# Patient Record
Sex: FEMALE | Race: WHITE | NOT HISPANIC OR LATINO | Employment: OTHER | URBAN - METROPOLITAN AREA
[De-identification: names, ages, dates, MRNs, and addresses within clinical notes are randomized per-mention and may not be internally consistent; named-entity substitution may affect disease eponyms.]

---

## 2019-02-18 ENCOUNTER — APPOINTMENT (OUTPATIENT)
Dept: LAB | Facility: HOSPITAL | Age: 79
End: 2019-02-18

## 2019-02-18 ENCOUNTER — OFFICE VISIT (OUTPATIENT)
Dept: FAMILY MEDICINE CLINIC | Facility: CLINIC | Age: 79
End: 2019-02-18

## 2019-02-18 VITALS
WEIGHT: 230 LBS | HEIGHT: 62 IN | OXYGEN SATURATION: 95 % | DIASTOLIC BLOOD PRESSURE: 86 MMHG | BODY MASS INDEX: 42.33 KG/M2 | HEART RATE: 73 BPM | SYSTOLIC BLOOD PRESSURE: 128 MMHG

## 2019-02-18 DIAGNOSIS — K21.9 GASTROESOPHAGEAL REFLUX DISEASE, ESOPHAGITIS PRESENCE NOT SPECIFIED: ICD-10-CM

## 2019-02-18 DIAGNOSIS — I10 ESSENTIAL HYPERTENSION: Primary | ICD-10-CM

## 2019-02-18 DIAGNOSIS — E87.6 HYPOKALEMIA: ICD-10-CM

## 2019-02-18 LAB
ALBUMIN SERPL-MCNC: 4.06 G/DL (ref 3.2–4.8)
ALBUMIN/GLOB SERPL: 1.4 G/DL (ref 1.5–2.5)
ALP SERPL-CCNC: 107 U/L (ref 25–100)
ALT SERPL W P-5'-P-CCNC: 24 U/L (ref 7–40)
ANION GAP SERPL CALCULATED.3IONS-SCNC: 8 MMOL/L (ref 3–11)
AST SERPL-CCNC: 26 U/L (ref 0–33)
BILIRUB SERPL-MCNC: 0.5 MG/DL (ref 0.3–1.2)
BUN BLD-MCNC: 17 MG/DL (ref 9–23)
BUN/CREAT SERPL: 21.3 (ref 7–25)
CALCIUM SPEC-SCNC: 8.9 MG/DL (ref 8.7–10.4)
CHLORIDE SERPL-SCNC: 104 MMOL/L (ref 99–109)
CO2 SERPL-SCNC: 30 MMOL/L (ref 20–31)
CREAT BLD-MCNC: 0.8 MG/DL (ref 0.6–1.3)
GFR SERPL CREATININE-BSD FRML MDRD: 70 ML/MIN/1.73
GLOBULIN UR ELPH-MCNC: 2.8 GM/DL
GLUCOSE BLD-MCNC: 96 MG/DL (ref 70–100)
POTASSIUM BLD-SCNC: 4 MMOL/L (ref 3.5–5.5)
PROT SERPL-MCNC: 6.9 G/DL (ref 5.7–8.2)
SODIUM BLD-SCNC: 142 MMOL/L (ref 132–146)

## 2019-02-18 PROCEDURE — 80053 COMPREHEN METABOLIC PANEL: CPT | Performed by: FAMILY MEDICINE

## 2019-02-18 PROCEDURE — 99204 OFFICE O/P NEW MOD 45 MIN: CPT | Performed by: FAMILY MEDICINE

## 2019-02-18 RX ORDER — POTASSIUM CHLORIDE 20 MEQ/1
20 TABLET, EXTENDED RELEASE ORAL DAILY
Qty: 60 TABLET | Refills: 0 | Status: SHIPPED | OUTPATIENT
Start: 2019-02-18 | End: 2019-08-19

## 2019-02-18 RX ORDER — FELODIPINE 10 MG/1
10 TABLET, EXTENDED RELEASE ORAL DAILY
Qty: 90 TABLET | Refills: 1 | Status: SHIPPED | OUTPATIENT
Start: 2019-02-18 | End: 2019-08-19 | Stop reason: SDUPTHER

## 2019-02-18 RX ORDER — TRIAMTERENE AND HYDROCHLOROTHIAZIDE 37.5; 25 MG/1; MG/1
1 CAPSULE ORAL EVERY MORNING
Qty: 90 CAPSULE | Refills: 1 | Status: SHIPPED | OUTPATIENT
Start: 2019-02-18 | End: 2019-08-19 | Stop reason: SDUPTHER

## 2019-02-18 RX ORDER — OXYBUTYNIN CHLORIDE 10 MG/1
10 TABLET, EXTENDED RELEASE ORAL DAILY
COMMUNITY
End: 2020-09-28

## 2019-02-18 RX ORDER — LISINOPRIL 40 MG/1
40 TABLET ORAL DAILY
COMMUNITY
End: 2019-02-18 | Stop reason: SDUPTHER

## 2019-02-18 RX ORDER — POTASSIUM CHLORIDE 20 MEQ/1
20 TABLET, EXTENDED RELEASE ORAL 2 TIMES DAILY
Qty: 180 TABLET | Refills: 1 | Status: SHIPPED | OUTPATIENT
Start: 2019-02-18 | End: 2019-08-19 | Stop reason: SDUPTHER

## 2019-02-18 RX ORDER — POTASSIUM CHLORIDE 20 MEQ/1
20 TABLET, EXTENDED RELEASE ORAL 2 TIMES DAILY
COMMUNITY
End: 2019-02-18 | Stop reason: SDUPTHER

## 2019-02-18 RX ORDER — LISINOPRIL 40 MG/1
40 TABLET ORAL DAILY
Qty: 30 TABLET | Refills: 0 | Status: SHIPPED | OUTPATIENT
Start: 2019-02-18 | End: 2019-08-19

## 2019-02-18 RX ORDER — MONTELUKAST SODIUM 10 MG/1
10 TABLET ORAL NIGHTLY
COMMUNITY
End: 2021-08-05 | Stop reason: SDUPTHER

## 2019-02-18 RX ORDER — OMEPRAZOLE 20 MG/1
20 CAPSULE, DELAYED RELEASE ORAL DAILY
Qty: 90 CAPSULE | Refills: 1 | Status: SHIPPED | OUTPATIENT
Start: 2019-02-18 | End: 2019-08-19 | Stop reason: SDUPTHER

## 2019-02-18 RX ORDER — OMEPRAZOLE 20 MG/1
20 CAPSULE, DELAYED RELEASE ORAL DAILY
Qty: 30 CAPSULE | Refills: 0 | Status: SHIPPED | OUTPATIENT
Start: 2019-02-18 | End: 2019-08-19

## 2019-02-18 RX ORDER — OMEPRAZOLE 20 MG/1
20 CAPSULE, DELAYED RELEASE ORAL DAILY
COMMUNITY
End: 2019-02-18 | Stop reason: SDUPTHER

## 2019-02-18 RX ORDER — FELODIPINE 10 MG/1
10 TABLET, EXTENDED RELEASE ORAL DAILY
COMMUNITY
End: 2019-02-18 | Stop reason: SDUPTHER

## 2019-02-18 RX ORDER — TRIAMTERENE AND HYDROCHLOROTHIAZIDE 37.5; 25 MG/1; MG/1
1 CAPSULE ORAL EVERY MORNING
COMMUNITY
End: 2019-02-18 | Stop reason: SDUPTHER

## 2019-02-18 RX ORDER — LISINOPRIL 40 MG/1
40 TABLET ORAL DAILY
Qty: 90 TABLET | Refills: 1 | Status: SHIPPED | OUTPATIENT
Start: 2019-02-18 | End: 2019-08-19 | Stop reason: SDUPTHER

## 2019-02-18 RX ORDER — ALBUTEROL SULFATE 90 UG/1
2 AEROSOL, METERED RESPIRATORY (INHALATION) EVERY 4 HOURS PRN
COMMUNITY
End: 2020-01-21 | Stop reason: SDUPTHER

## 2019-02-18 RX ORDER — FELODIPINE 10 MG/1
10 TABLET, EXTENDED RELEASE ORAL DAILY
Qty: 30 TABLET | Refills: 0 | Status: SHIPPED | OUTPATIENT
Start: 2019-02-18 | End: 2019-08-19

## 2019-02-18 NOTE — PATIENT INSTRUCTIONS
Recombinant Zoster (Shingles) Vaccine, RZV: What You Need to Know  1. Why get vaccinated?  Shingles (also called herpes zoster, or just zoster) is a painful skin rash, often with blisters. Shingles is caused by the varicella zoster virus, the same virus that causes chickenpox. After you have chickenpox, the virus stays in your body and can cause shingles later in life.  You can't catch shingles from another person. However, a person who has never had chickenpox (or chickenpox vaccine) could get chickenpox from someone with shingles.  A shingles rash usually appears on one side of the face or body and heals within 2 to 4 weeks. Its main symptom is pain, which can be severe. Other symptoms can include fever, headache, chills and upset stomach. Very rarely, a shingles infection can lead to pneumonia, hearing problems, blindness, brain inflammation (encephalitis), or death.  For about 1 person in 5, severe pain can continue even long after the rash has cleared up. This long-lasting pain is called post-herpetic neuralgia (PHN).  Shingles is far more common in people 50 years of age and older than in younger people, and the risk increases with age. It is also more common in people whose immune system is weakened because of a disease such as cancer, or by drugs such as steroids or chemotherapy.  At least 1 million people a year in the [place] get shingles.  2. Shingles vaccine (recombinant)  Recombinant shingles vaccine was approved by FDA in 2017 for the prevention of shingles. In clinical trials, it was more than 90% effective in preventing shingles. It can also reduce the likelihood of PHN.  Two doses, 2 to 6 months apart, are recommended for adults 50 and older.  This vaccine is also recommended for people who have already gotten the live shingles vaccine (Zostavax). There is no live virus in this vaccine.  3. Some people should not get this vaccine  Tell your vaccine provider if you:  · Have any severe,  life-threatening allergies. A person who has ever had a life-threatening allergic reaction after a dose of recombinant shingles vaccine, or has a severe allergy to any component of this vaccine, may be advised not to be vaccinated. Ask your health care provider if you want information about vaccine components.  · Are pregnant or breastfeeding. There is not much information about use of recombinant shingles vaccine in pregnant or nursing women. Your healthcare provider might recommend delaying vaccination.  · Are not feeling well. If you have a mild illness, such as a cold, you can probably get the vaccine today. If you are moderately or severely ill, you should probably wait until you recover. Your doctor can advise you.    4. Risks of a vaccine reaction  With any medicine, including vaccines, there is a chance of reactions.  After recombinant shingles vaccination, a person might experience:  · Pain, redness, soreness, or swelling at the site of the injection  · Headache, muscle aches, fever, shivering, fatigue    In clinical trials, most people got a sore arm with mild or moderate pain after vaccination, and some also had redness and swelling where they got the shot. Some people felt tired, had muscle pain, a headache, shivering, fever, stomach pain, or nausea. About 1 out of 6 people who got recombinant zoster vaccine experienced side effects that prevented them from doing regular activities. Symptoms went away on their own in about 2 to 3 days. Side effects were more common in younger people.  You should still get the second dose of recombinant zoster vaccine even if you had one of these reactions after the first dose.  Other things that could happen after this vaccine:  · People sometimes faint after medical procedures, including vaccination. Sitting or lying down for about 15 minutes can help prevent fainting and injuries caused by a fall. Tell your provider if you feel dizzy or have vision changes or ringing  in the ears.  · Some people get shoulder pain that can be more severe and longer-lasting than routine soreness that can follow injections. This happens very rarely.  · Any medication can cause a severe allergic reaction. Such reactions to a vaccine are estimated at about 1 in a million doses, and would happen within a few minutes to a few hours after the vaccination.  As with any medicine, there is a very remote chance of a vaccine causing a serious injury or death.  The safety of vaccines is always being monitored. For more information, visit: www.cdc.gov/vaccinesafety/  5. What if there is a serious problem?  What should I look for?  · Look for anything that concerns you, such as signs of a severe allergic reaction, very high fever, or unusual behavior.  Signs of a severe allergic reaction can include hives, swelling of the face and throat, difficulty breathing, a fast heartbeat, dizziness, and weakness. These would usually start a few minutes to a few hours after the vaccination.  What should I do?  · If you think it is a severe allergic reaction or other emergency that can't wait, call 9-1-1 and get to the nearest hospital. Otherwise, call your health care provider.  Afterward, the reaction should be reported to the Vaccine Adverse Event Reporting System (VAERS). Your doctor should file this report, or you can do it yourself through the VAERS web site atwww.vaers.[facility].govor by calling 1-247.902.9596.  VAERS does not give medical advice.  6. How can I learn more?  · Ask your healthcare provider. He or she can give you the vaccine package insert or suggest other sources of information.  · Call your local or state health department.  · Contact the Centers for Disease Control and Prevention (CDC):  ? Call 1-336.118.4881 (6-862-DZP-INFO) or  ? Visit the CDC's website at www.cdc.gov/vaccines  CDC Vaccine Information Statement (VIS) Recombinant Zoster Vaccine (02/12/2018)  This information is not intended to replace  advice given to you by your health care provider. Make sure you discuss any questions you have with your health care provider.  Document Released: 02/27/2018 Document Revised: 02/27/2018 Document Reviewed: 02/27/2018  Elsevier Interactive Patient Education © 2018 [employer]

## 2019-02-18 NOTE — PROGRESS NOTES
Mariela Long presents today to establish care.    Chief Complaint   Patient presents with   • Establish Care     needs PCP        HPI     Sees pulmonologist. Allergies. She has spots on her lungs, CT scans.     HTN:  Blood pressure usually runs high. Out of blood pressure lisinopril and felodipine medication for a week. Doesn't always take felodipine, unless BP is above 200. Legs are swollen, doesn't take diuretics all the time. Out of potassium for awhile too.     GERD:  If she doesn't take prilosec then she has heartburn.     PHQ-2/PHQ-9 Depression Screening 2019   Little interest or pleasure in doing things 0   Feeling down, depressed, or hopeless 0   Total Score 0       Review of Systems   Constitutional: Positive for fatigue.   HENT: Negative.    Eyes: Negative.    Cardiovascular: Positive for leg swelling.   Gastrointestinal: Positive for GERD.   Endocrine: Negative.    Genitourinary: Negative.    Musculoskeletal: Negative.    Allergic/Immunologic: Positive for environmental allergies.   Neurological: Negative.    Hematological: Negative.    Psychiatric/Behavioral: Negative.         Past Medical History:   Diagnosis Date   • Aortic aneurysm (CMS/HCC)    • Arthritis    • Asthma    • Environmental allergies     dust, pollen, trees, grass   • Fractures    • GERD (gastroesophageal reflux disease)    • Hypertension    • Lesion of lung    • Liver lesion    • Shingles     age [age]        Past Surgical History:   Procedure Laterality Date   •  SECTION  8, , , ,    • WRIST SURGERY          Family History   Problem Relation Age of Onset   • Obesity Mother    • Lung cancer Father    • Lung cancer Sister    • Obesity Sister         Social History     Socioeconomic History   • Marital status: Single     Spouse name: Not on file   • Number of children: Not on file   • Years of education: Not on file   • Highest education level: Not on file   Social Needs   • Financial resource strain: Not  "on file   • Food insecurity - worry: Not on file   • Food insecurity - inability: Not on file   • Transportation needs - medical: Not on file   • Transportation needs - non-medical: Not on file   Occupational History     Comment: works with mentally challenged adults   Tobacco Use   • Smoking status: Never Smoker   • Smokeless tobacco: Never Used   Substance and Sexual Activity   • Alcohol use: Not on file   • Drug use: No   • Sexual activity: Not on file   Other Topics Concern   • Not on file   Social History Narrative   • Not on file        Current Outpatient Medications on File Prior to Visit   Medication Sig Dispense Refill   • albuterol sulfate  (90 Base) MCG/ACT inhaler Inhale 2 puffs Every 4 (Four) Hours As Needed for Wheezing.     • Fluticasone Furoate-Vilanterol (BREO ELLIPTA) 200-25 MCG/INH inhaler Inhale 1 puff Daily.     • montelukast (SINGULAIR) 10 MG tablet Take 10 mg by mouth Every Night.     • oxybutynin XL (DITROPAN-XL) 10 MG 24 hr tablet Take 10 mg by mouth Daily.     • [DISCONTINUED] felodipine (PLENDIL) 10 MG 24 hr tablet Take 10 mg by mouth Daily.     • [DISCONTINUED] lisinopril (PRINIVIL,ZESTRIL) 40 MG tablet Take 40 mg by mouth Daily.     • [DISCONTINUED] omeprazole (priLOSEC) 20 MG capsule Take 20 mg by mouth Daily.     • [DISCONTINUED] potassium chloride (K-DUR,KLOR-CON) 20 MEQ CR tablet Take 20 mEq by mouth 2 (Two) Times a Day.     • [DISCONTINUED] triamterene-hydrochlorothiazide (DYAZIDE) 37.5-25 MG per capsule Take 1 capsule by mouth Every Morning.       No current facility-administered medications on file prior to visit.        Allergies   Allergen Reactions   • Latex Rash        Visit Vitals  /86 (BP Location: Left arm, Patient Position: Sitting, Cuff Size: Large Adult)   Pulse 73   Ht 157.5 cm (62\")   Wt 104 kg (230 lb)   SpO2 95%   BMI 42.07 kg/m²        Physical Exam   Constitutional: She is oriented to person, place, and time. No distress. She is obese.  HENT:   Nose: " Nose normal.   Mouth/Throat: Oropharynx is clear and moist.   Eyes: Conjunctivae are normal. Pupils are equal, round, and reactive to light.   Neck: Neck supple. No thyromegaly present.   Cardiovascular: Normal rate and regular rhythm.   No murmur heard.  Pulses:       Posterior tibial pulses are 2+ on the right side, and 2+ on the left side.   Pulmonary/Chest: Effort normal and breath sounds normal.   Abdominal: Soft. There is no hepatosplenomegaly. There is no tenderness.   Surgical scar   Musculoskeletal: She exhibits no edema.   Lymphadenopathy:     She has no cervical adenopathy.   Neurological: She is alert and oriented to person, place, and time.   Skin: Skin is warm and dry. No rash noted.   Psychiatric: She has a normal mood and affect. Her behavior is normal. Judgment and thought content normal.   Vitals reviewed.       No results found for this or any previous visit.     Mariela was seen today for establish care.  Prior PCP, urology, pulmonology, mammogram, CT scan records requested  Diagnoses and all orders for this visit:    Essential hypertension  -     lisinopril (PRINIVIL,ZESTRIL) 40 MG tablet; Take 1 tablet by mouth Daily.  -     triamterene-hydrochlorothiazide (DYAZIDE) 37.5-25 MG per capsule; Take 1 capsule by mouth Every Morning.  -     felodipine (PLENDIL) 10 MG 24 hr tablet; Take 1 tablet by mouth Daily.  -     Comprehensive Metabolic Panel; Future  -     felodipine (PLENDIL) 10 MG 24 hr tablet; Take 1 tablet by mouth Daily.  -     lisinopril (PRINIVIL,ZESTRIL) 40 MG tablet; Take 1 tablet by mouth Daily.  -     omeprazole (PRILOSEC) 20 MG capsule; Take 1 capsule by mouth Daily.  Stable.  Non-compliant with medications.  She has only taking medications and her blood pressure was above 180-200.  Discussed long-term risk of uncontrolled hypertension.  Check labs today due to chronic hypokalemia and uses both lisinopril potassium and Dyazide.  Of note patient is nonfasting and had sweet  tea.  Hypokalemia  -     potassium chloride (K-DUR,KLOR-CON) 20 MEQ CR tablet; Take 1 tablet by mouth 2 (Two) Times a Day.  -     potassium chloride (KLOR-CON) 20 MEQ CR tablet; Take 1 tablet by mouth Daily.   Check labs today due to chronic hypokalemia and uses both lisinopril potassium and Dyazide.  Of note patient is nonfasting and had sweet tea.  Gastroesophageal reflux disease, esophagitis presence not specified  -     omeprazole (priLOSEC) 20 MG capsule; Take 1 capsule by mouth Daily.  Discussed with patient long-term risks of PPI use.  She reports that improves her quality of life and controls her heartburn.  Continue omeprazole.    Discussed Shingrix vaccination at local pharmacy.  Handout provided.    Return in about 6 months (around 8/18/2019) for Medicare Wellness.

## 2019-03-20 PROBLEM — R91.8 GROUND GLASS OPACITY PRESENT ON IMAGING OF LUNG: Status: ACTIVE | Noted: 2017-01-01

## 2019-08-19 ENCOUNTER — OFFICE VISIT (OUTPATIENT)
Dept: FAMILY MEDICINE CLINIC | Facility: CLINIC | Age: 79
End: 2019-08-19

## 2019-08-19 VITALS
BODY MASS INDEX: 42.62 KG/M2 | OXYGEN SATURATION: 92 % | DIASTOLIC BLOOD PRESSURE: 82 MMHG | WEIGHT: 231.6 LBS | HEIGHT: 62 IN | SYSTOLIC BLOOD PRESSURE: 138 MMHG | HEART RATE: 63 BPM

## 2019-08-19 DIAGNOSIS — K21.9 GASTROESOPHAGEAL REFLUX DISEASE, ESOPHAGITIS PRESENCE NOT SPECIFIED: ICD-10-CM

## 2019-08-19 DIAGNOSIS — E87.6 HYPOKALEMIA: ICD-10-CM

## 2019-08-19 DIAGNOSIS — I10 ESSENTIAL HYPERTENSION: Primary | ICD-10-CM

## 2019-08-19 PROCEDURE — 99214 OFFICE O/P EST MOD 30 MIN: CPT | Performed by: FAMILY MEDICINE

## 2019-08-19 RX ORDER — TRIAMTERENE AND HYDROCHLOROTHIAZIDE 37.5; 25 MG/1; MG/1
1 CAPSULE ORAL EVERY MORNING
Qty: 90 CAPSULE | Refills: 1 | Status: SHIPPED | OUTPATIENT
Start: 2019-08-19 | End: 2020-09-28 | Stop reason: SDUPTHER

## 2019-08-19 RX ORDER — LISINOPRIL 40 MG/1
40 TABLET ORAL DAILY
Qty: 90 TABLET | Refills: 1 | Status: SHIPPED | OUTPATIENT
Start: 2019-08-19 | End: 2020-03-10

## 2019-08-19 RX ORDER — OMEPRAZOLE 20 MG/1
20 CAPSULE, DELAYED RELEASE ORAL DAILY
Qty: 90 CAPSULE | Refills: 1 | Status: SHIPPED | OUTPATIENT
Start: 2019-08-19 | End: 2020-03-10

## 2019-08-19 RX ORDER — POTASSIUM CHLORIDE 20 MEQ/1
20 TABLET, EXTENDED RELEASE ORAL 2 TIMES DAILY
Qty: 180 TABLET | Refills: 1 | Status: SHIPPED | OUTPATIENT
Start: 2019-08-19 | End: 2020-03-10

## 2019-08-19 RX ORDER — FELODIPINE 10 MG/1
10 TABLET, EXTENDED RELEASE ORAL DAILY
Qty: 90 TABLET | Refills: 1 | Status: SHIPPED | OUTPATIENT
Start: 2019-08-19 | End: 2020-09-28 | Stop reason: SDUPTHER

## 2019-08-19 NOTE — PROGRESS NOTES
Chief Complaint   Patient presents with   • Hypertension   • Heartburn        Hypertension   This is a chronic problem. Pertinent negatives include no chest pain or palpitations. Current antihypertension treatment includes diuretics, calcium channel blockers and ACE inhibitors.   Heartburn   She reports no chest pain. This is a chronic problem. Associated symptoms include fatigue. She has tried a PPI for the symptoms.       Hasn't checked home blood pressure lately. Legs are always swollen.      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       Right lower back pain, non-radiating. Taking aleve as needed.     Review of Systems   Constitutional: Positive for fatigue.   Cardiovascular: Positive for leg swelling. Negative for chest pain and palpitations.   Gastrointestinal: Negative for GERD.   Musculoskeletal: Positive for back pain.   Psychiatric/Behavioral: Positive for stress.        Current Outpatient Medications on File Prior to Visit   Medication Sig Dispense Refill   • albuterol sulfate  (90 Base) MCG/ACT inhaler Inhale 2 puffs Every 4 (Four) Hours As Needed for Wheezing.     • Fluticasone Furoate-Vilanterol (BREO ELLIPTA) 200-25 MCG/INH inhaler Inhale 1 puff Daily.     • montelukast (SINGULAIR) 10 MG tablet Take 10 mg by mouth Every Night.     • oxybutynin XL (DITROPAN-XL) 10 MG 24 hr tablet Take 10 mg by mouth Daily.     • [DISCONTINUED] felodipine (PLENDIL) 10 MG 24 hr tablet Take 1 tablet by mouth Daily. 90 tablet 1   • [DISCONTINUED] felodipine (PLENDIL) 10 MG 24 hr tablet Take 1 tablet by mouth Daily. 30 tablet 0   • [DISCONTINUED] lisinopril (PRINIVIL,ZESTRIL) 40 MG tablet Take 1 tablet by mouth Daily. 90 tablet 1   • [DISCONTINUED] lisinopril (PRINIVIL,ZESTRIL) 40 MG tablet Take 1 tablet by mouth Daily. 30 tablet 0   • [DISCONTINUED] omeprazole (priLOSEC) 20 MG capsule Take 1 capsule by mouth Daily. 90 capsule 1   • [DISCONTINUED] omeprazole (PRILOSEC) 20 MG capsule  "Take 1 capsule by mouth Daily. 30 capsule 0   • [DISCONTINUED] potassium chloride (K-DUR,KLOR-CON) 20 MEQ CR tablet Take 1 tablet by mouth 2 (Two) Times a Day. 180 tablet 1   • [DISCONTINUED] potassium chloride (KLOR-CON) 20 MEQ CR tablet Take 1 tablet by mouth Daily. 60 tablet 0   • [DISCONTINUED] triamterene-hydrochlorothiazide (DYAZIDE) 37.5-25 MG per capsule Take 1 capsule by mouth Every Morning. 90 capsule 1     No current facility-administered medications on file prior to visit.        Allergies   Allergen Reactions   • Crestor [Rosuvastatin Calcium] Other (See Comments)     Fatigue, CK elevated   • Pravastatin Myalgia   • Latex Rash       Past Medical History:   Diagnosis Date   • Aortic aneurysm (CMS/HCC)    • Arthritis    • Asthma    • Environmental allergies     dust, pollen, trees, grass   • Fractures    • GERD (gastroesophageal reflux disease)    • Ground glass opacity present on imaging of lung    • H/O bone density study 2016    normal   • H/O pulmonary function tests 06/15/2017    normal   • Hypertension    • Liver lesion    • Shingles     age [age]        Past Surgical History:   Procedure Laterality Date   •  SECTION  , , , ,    • WRIST SURGERY          Family History   Problem Relation Age of Onset   • Obesity Mother    • Lung cancer Father    • Lung cancer Sister    • Obesity Sister         Social History     Socioeconomic History   • Marital status: Single     Spouse name: Not on file   • Number of children: Not on file   • Years of education: Not on file   • Highest education level: Not on file   Occupational History     Comment: works with mentally challenged adults   Tobacco Use   • Smoking status: Never Smoker   • Smokeless tobacco: Never Used   Substance and Sexual Activity   • Drug use: No        Visit Vitals  /90 (BP Location: Right arm, Patient Position: Sitting, Cuff Size: Large Adult)   Pulse 63   Ht 157.5 cm (62\")   Wt 105 kg (231 lb 9.6 oz) " "  SpO2 92%   BMI 42.36 kg/m²     Visit Vitals  /82   Pulse 63   Ht 157.5 cm (62\")   Wt 105 kg (231 lb 9.6 oz)   SpO2 92%   BMI 42.36 kg/m²          Physical Exam   Constitutional: No distress. She is obese.  Cardiovascular: Normal rate and regular rhythm.   No murmur heard.  Pulmonary/Chest: Effort normal and breath sounds normal.   Musculoskeletal: She exhibits edema ( 1+ ankle bilateral).   Psychiatric: She has a normal mood and affect.   Vitals reviewed.           Mariela was seen today for hypertension and heartburn.    Diagnoses and all orders for this visit:    Essential hypertension  -     triamterene-hydrochlorothiazide (DYAZIDE) 37.5-25 MG per capsule; Take 1 capsule by mouth Every Morning.  -     lisinopril (PRINIVIL,ZESTRIL) 40 MG tablet; Take 1 tablet by mouth Daily.  -     felodipine (PLENDIL) 10 MG 24 hr tablet; Take 1 tablet by mouth Daily.  Improved on recheck.  Continue current medications.  Advised to limit NSAIDs which can affect her blood pressure control.    Hypokalemia  -     potassium chloride (K-DUR,KLOR-CON) 20 MEQ CR tablet; Take 1 tablet by mouth 2 (Two) Times a Day.  Continue current medications.  Gastroesophageal reflux disease, esophagitis presence not specified  -     omeprazole (priLOSEC) 20 MG capsule; Take 1 capsule by mouth Daily.  Patient has tried to come off PPIs in the past without success.  Continue long-term PPI treatment.        "

## 2019-09-24 ENCOUNTER — OFFICE VISIT (OUTPATIENT)
Dept: FAMILY MEDICINE CLINIC | Facility: CLINIC | Age: 79
End: 2019-09-24

## 2019-09-24 ENCOUNTER — LAB (OUTPATIENT)
Dept: LAB | Facility: HOSPITAL | Age: 79
End: 2019-09-24

## 2019-09-24 VITALS
BODY MASS INDEX: 41.62 KG/M2 | HEIGHT: 62 IN | SYSTOLIC BLOOD PRESSURE: 136 MMHG | OXYGEN SATURATION: 97 % | HEART RATE: 71 BPM | DIASTOLIC BLOOD PRESSURE: 82 MMHG | WEIGHT: 226.2 LBS

## 2019-09-24 DIAGNOSIS — Z13.29 SCREENING FOR THYROID DISORDER: ICD-10-CM

## 2019-09-24 DIAGNOSIS — Z12.39 SCREENING FOR BREAST CANCER: ICD-10-CM

## 2019-09-24 DIAGNOSIS — Z13.0 SCREENING FOR DEFICIENCY ANEMIA: ICD-10-CM

## 2019-09-24 DIAGNOSIS — Z00.00 WELL ADULT EXAM: ICD-10-CM

## 2019-09-24 DIAGNOSIS — Z00.00 WELL ADULT EXAM: Primary | ICD-10-CM

## 2019-09-24 DIAGNOSIS — R73.03 PREDIABETES: ICD-10-CM

## 2019-09-24 DIAGNOSIS — R91.8 GROUND GLASS OPACITY PRESENT ON IMAGING OF LUNG: ICD-10-CM

## 2019-09-24 DIAGNOSIS — I10 ESSENTIAL HYPERTENSION: ICD-10-CM

## 2019-09-24 DIAGNOSIS — E66.01 MORBIDLY OBESE (HCC): ICD-10-CM

## 2019-09-24 LAB
ALBUMIN SERPL-MCNC: 4.5 G/DL (ref 3.5–5.2)
ALBUMIN/GLOB SERPL: 1.4 G/DL
ALP SERPL-CCNC: 96 U/L (ref 39–117)
ALT SERPL W P-5'-P-CCNC: 17 U/L (ref 1–33)
ANION GAP SERPL CALCULATED.3IONS-SCNC: 13.3 MMOL/L (ref 5–15)
AST SERPL-CCNC: 21 U/L (ref 1–32)
BASOPHILS # BLD AUTO: 0.06 10*3/MM3 (ref 0–0.2)
BASOPHILS NFR BLD AUTO: 0.7 % (ref 0–1.5)
BILIRUB SERPL-MCNC: 0.4 MG/DL (ref 0.2–1.2)
BUN BLD-MCNC: 12 MG/DL (ref 8–23)
BUN/CREAT SERPL: 16.2 (ref 7–25)
CALCIUM SPEC-SCNC: 9 MG/DL (ref 8.6–10.5)
CHLORIDE SERPL-SCNC: 106 MMOL/L (ref 98–107)
CHOLEST SERPL-MCNC: 197 MG/DL (ref 0–200)
CO2 SERPL-SCNC: 27.7 MMOL/L (ref 22–29)
CREAT BLD-MCNC: 0.74 MG/DL (ref 0.57–1)
DEPRECATED RDW RBC AUTO: 44.7 FL (ref 37–54)
EOSINOPHIL # BLD AUTO: 0.24 10*3/MM3 (ref 0–0.4)
EOSINOPHIL NFR BLD AUTO: 3 % (ref 0.3–6.2)
ERYTHROCYTE [DISTWIDTH] IN BLOOD BY AUTOMATED COUNT: 13.3 % (ref 12.3–15.4)
GFR SERPL CREATININE-BSD FRML MDRD: 76 ML/MIN/1.73
GLOBULIN UR ELPH-MCNC: 3.3 GM/DL
GLUCOSE BLD-MCNC: 97 MG/DL (ref 65–99)
HBA1C MFR BLD: 6.23 % (ref 4.8–5.6)
HCT VFR BLD AUTO: 40.4 % (ref 34–46.6)
HDLC SERPL-MCNC: 57 MG/DL (ref 40–60)
HGB BLD-MCNC: 12.9 G/DL (ref 12–15.9)
IMM GRANULOCYTES # BLD AUTO: 0.02 10*3/MM3 (ref 0–0.05)
IMM GRANULOCYTES NFR BLD AUTO: 0.2 % (ref 0–0.5)
LDLC SERPL CALC-MCNC: 118 MG/DL (ref 0–100)
LDLC/HDLC SERPL: 2.07 {RATIO}
LYMPHOCYTES # BLD AUTO: 1.75 10*3/MM3 (ref 0.7–3.1)
LYMPHOCYTES NFR BLD AUTO: 21.8 % (ref 19.6–45.3)
MCH RBC QN AUTO: 29 PG (ref 26.6–33)
MCHC RBC AUTO-ENTMCNC: 31.9 G/DL (ref 31.5–35.7)
MCV RBC AUTO: 90.8 FL (ref 79–97)
MONOCYTES # BLD AUTO: 0.61 10*3/MM3 (ref 0.1–0.9)
MONOCYTES NFR BLD AUTO: 7.6 % (ref 5–12)
NEUTROPHILS # BLD AUTO: 5.33 10*3/MM3 (ref 1.7–7)
NEUTROPHILS NFR BLD AUTO: 66.7 % (ref 42.7–76)
NRBC BLD AUTO-RTO: 0.1 /100 WBC (ref 0–0.2)
PLATELET # BLD AUTO: 277 10*3/MM3 (ref 140–450)
PMV BLD AUTO: 12.7 FL (ref 6–12)
POTASSIUM BLD-SCNC: 4.1 MMOL/L (ref 3.5–5.2)
PROT SERPL-MCNC: 7.8 G/DL (ref 6–8.5)
RBC # BLD AUTO: 4.45 10*6/MM3 (ref 3.77–5.28)
SODIUM BLD-SCNC: 147 MMOL/L (ref 136–145)
TRIGL SERPL-MCNC: 110 MG/DL (ref 0–150)
TSH SERPL DL<=0.05 MIU/L-ACNC: 2.06 UIU/ML (ref 0.27–4.2)
VLDLC SERPL-MCNC: 22 MG/DL (ref 5–40)
WBC NRBC COR # BLD: 8.01 10*3/MM3 (ref 3.4–10.8)

## 2019-09-24 PROCEDURE — 80053 COMPREHEN METABOLIC PANEL: CPT

## 2019-09-24 PROCEDURE — 84443 ASSAY THYROID STIM HORMONE: CPT

## 2019-09-24 PROCEDURE — 80061 LIPID PANEL: CPT

## 2019-09-24 PROCEDURE — G0439 PPPS, SUBSEQ VISIT: HCPCS | Performed by: FAMILY MEDICINE

## 2019-09-24 PROCEDURE — 85025 COMPLETE CBC W/AUTO DIFF WBC: CPT

## 2019-09-24 PROCEDURE — 99397 PER PM REEVAL EST PAT 65+ YR: CPT | Performed by: FAMILY MEDICINE

## 2019-09-24 PROCEDURE — 83036 HEMOGLOBIN GLYCOSYLATED A1C: CPT

## 2019-09-24 NOTE — PATIENT INSTRUCTIONS
Recombinant Zoster (Shingles) Vaccine, RZV: What You Need to Know  1. Why get vaccinated?  Shingles (also called herpes zoster, or just zoster) is a painful skin rash, often with blisters. Shingles is caused by the varicella zoster virus, the same virus that causes chickenpox. After you have chickenpox, the virus stays in your body and can cause shingles later in life.  You can't catch shingles from another person. However, a person who has never had chickenpox (or chickenpox vaccine) could get chickenpox from someone with shingles.  A shingles rash usually appears on one side of the face or body and heals within 2 to 4 weeks. Its main symptom is pain, which can be severe. Other symptoms can include fever, headache, chills and upset stomach. Very rarely, a shingles infection can lead to pneumonia, hearing problems, blindness, brain inflammation (encephalitis), or death.  For about 1 person in 5, severe pain can continue even long after the rash has cleared up. This long-lasting pain is called post-herpetic neuralgia (PHN).  Shingles is far more common in people 50 years of age and older than in younger people, and the risk increases with age. It is also more common in people whose immune system is weakened because of a disease such as cancer, or by drugs such as steroids or chemotherapy.  At least 1 million people a year in the [place] get shingles.  2. Shingles vaccine (recombinant)  Recombinant shingles vaccine was approved by FDA in 2017 for the prevention of shingles. In clinical trials, it was more than 90% effective in preventing shingles. It can also reduce the likelihood of PHN.  Two doses, 2 to 6 months apart, are recommended for adults 50 and older.  This vaccine is also recommended for people who have already gotten the live shingles vaccine (Zostavax). There is no live virus in this vaccine.  3. Some people should not get this vaccine  Tell your vaccine provider if you:  · Have any severe,  life-threatening allergies. A person who has ever had a life-threatening allergic reaction after a dose of recombinant shingles vaccine, or has a severe allergy to any component of this vaccine, may be advised not to be vaccinated. Ask your health care provider if you want information about vaccine components.  · Are pregnant or breastfeeding. There is not much information about use of recombinant shingles vaccine in pregnant or nursing women. Your healthcare provider might recommend delaying vaccination.  · Are not feeling well. If you have a mild illness, such as a cold, you can probably get the vaccine today. If you are moderately or severely ill, you should probably wait until you recover. Your doctor can advise you.  4. Risks of a vaccine reaction  With any medicine, including vaccines, there is a chance of reactions.  After recombinant shingles vaccination, a person might experience:  · Pain, redness, soreness, or swelling at the site of the injection  · Headache, muscle aches, fever, shivering, fatigue  In clinical trials, most people got a sore arm with mild or moderate pain after vaccination, and some also had redness and swelling where they got the shot. Some people felt tired, had muscle pain, a headache, shivering, fever, stomach pain, or nausea. About 1 out of 6 people who got recombinant zoster vaccine experienced side effects that prevented them from doing regular activities. Symptoms went away on their own in about 2 to 3 days. Side effects were more common in younger people.  You should still get the second dose of recombinant zoster vaccine even if you had one of these reactions after the first dose.  Other things that could happen after this vaccine:  · People sometimes faint after medical procedures, including vaccination. Sitting or lying down for about 15 minutes can help prevent fainting and injuries caused by a fall. Tell your provider if you feel dizzy or have vision changes or ringing in  the ears.  · Some people get shoulder pain that can be more severe and longer-lasting than routine soreness that can follow injections. This happens very rarely.  · Any medication can cause a severe allergic reaction. Such reactions to a vaccine are estimated at about 1 in a million doses, and would happen within a few minutes to a few hours after the vaccination.  As with any medicine, there is a very remote chance of a vaccine causing a serious injury or death.  The safety of vaccines is always being monitored. For more information, visit: www.cdc.gov/vaccinesafety/  5. What if there is a serious problem?  What should I look for?  · Look for anything that concerns you, such as signs of a severe allergic reaction, very high fever, or unusual behavior.  Signs of a severe allergic reaction can include hives, swelling of the face and throat, difficulty breathing, a fast heartbeat, dizziness, and weakness. These would usually start a few minutes to a few hours after the vaccination.  What should I do?  · If you think it is a severe allergic reaction or other emergency that can't wait, call 9-1-1 and get to the nearest hospital. Otherwise, call your health care provider.  Afterward, the reaction should be reported to the Vaccine Adverse Event Reporting System (VAERS). Your doctor should file this report, or you can do it yourself through the VAERS web site atwww.[employer].[facility].govor by calling 1-846.605.8226.  VAERS does not give medical advice.  6. How can I learn more?  · Ask your healthcare provider. He or she can give you the vaccine package insert or suggest other sources of information.  · Call your local or state health department.  · Contact the Centers for Disease Control and Prevention (CDC):  ? Call 1-766.209.6752 (7-339-MFD-INFO) or  ? Visit the CDC's website at www.cdc.gov/vaccines  CDC Vaccine Information Statement (VIS) Recombinant Zoster Vaccine (02/12/2018)  This information is not intended to replace advice  given to you by your health care provider. Make sure you discuss any questions you have with your health care provider.  Document Released: 2018 Document Revised: 2018 Document Reviewed: 2018  [employer] Interactive Patient Education © 2019 [employer]        Medicare Wellness  Personal Prevention Plan of Service     Date of Office Visit:  2019  Encounter Provider:  Brittany Ledezma MD  Place of Service:  [facility]  Patient Name: Mariela Long  :  1940    As part of the Medicare Wellness portion of your visit today, we are providing you with this personalized preventive plan of services (PPPS). This plan is based upon recommendations of the [place] Preventive Services Task Force (USPSTF) and the Advisory Committee on Immunization Practices (ACIP).    This lists the preventive care services that should be considered, and provides dates of when you are due. Items listed as completed are up-to-date and do not require any further intervention.    Health Maintenance   Topic Date Due   • URINE MICROALBUMIN  1940   • ZOSTER VACCINE (2 of 3) 2010   • MEDICARE ANNUAL WELLNESS  2019   • HEMOGLOBIN A1C  2019   • DIABETIC EYE EXAM  2019   • INFLUENZA VACCINE  2019   • TDAP/TD VACCINES (2 - Td) 2021   • PNEUMOCOCCAL VACCINES (65+ LOW/MEDIUM RISK)  Completed       Orders Placed This Encounter   Procedures   • CT Chest With Contrast     Standing Status:   Future     Standing Expiration Date:   2020     Scheduling Instructions:      Prefer Mon-Tues.   • Comprehensive Metabolic Panel     Standing Status:   Future     Standing Expiration Date:   2020   • Lipid Panel     Standing Status:   Future     Standing Expiration Date:   2020   • TSH Rfx On Abnormal To Free T4     Standing Status:   Future     Standing Expiration Date:   2020   • Hemoglobin A1c     Standing Status:   Future     Standing  Expiration Date:   2020   • CBC & Differential     Standing Status:   Future     Standing Expiration Date:   2020     Order Specific Question:   Manual Differential     Answer:   No       Return in about 5 months (around 2020) for Follow-up, AWV 1 year.          Medicare Wellness  Personal Prevention Plan of Service     Date of Office Visit:  2019  Encounter Provider:  Brittany Ledezma MD  Place of Service:  [facility]  Patient Name: Mariela Long  :  1940    As part of the Medicare Wellness portion of your visit today, we are providing you with this personalized preventive plan of services (PPPS). This plan is based upon recommendations of the [place] Preventive Services Task Force (USPSTF) and the Advisory Committee on Immunization Practices (ACIP).    This lists the preventive care services that should be considered, and provides dates of when you are due. Items listed as completed are up-to-date and do not require any further intervention.    Health Maintenance   Topic Date Due   • URINE MICROALBUMIN  1940   • ZOSTER VACCINE (2 of 3) 2010   • MEDICARE ANNUAL WELLNESS  2019   • HEMOGLOBIN A1C  2019   • DIABETIC EYE EXAM  2019   • INFLUENZA VACCINE  2019   • TDAP/TD VACCINES (2 - Td) 2021   • PNEUMOCOCCAL VACCINES (65+ LOW/MEDIUM RISK)  Completed       Orders Placed This Encounter   Procedures   • CT Chest With Contrast     Standing Status:   Future     Standing Expiration Date:   2020     Scheduling Instructions:      Prefer Mon-Tues.   • Comprehensive Metabolic Panel     Standing Status:   Future     Standing Expiration Date:   2020   • Lipid Panel     Standing Status:   Future     Standing Expiration Date:   2020   • TSH Rfx On Abnormal To Free T4     Standing Status:   Future     Standing Expiration Date:   2020   • Hemoglobin A1c     Standing Status:   Future     Standing  Expiration Date:   9/24/2020   • CBC & Differential     Standing Status:   Future     Standing Expiration Date:   9/24/2020     Order Specific Question:   Manual Differential     Answer:   No       Return in about 5 months (around 2/24/2020) for Follow-up, AWV 1 year.

## 2019-09-24 NOTE — PROGRESS NOTES
The ABCs of the Annual Wellness Visit  Subsequent Medicare Wellness Visit    Chief Complaint   Patient presents with   • Medicare Wellness-subsequent       Subjective   History of Present Illness:  Mariela Long is a 78 y.o. female who presents for a Subsequent Medicare Wellness Visit.    Saw pulmonary in past, saw something but nothing to worry about it. Doesn't use Breo unless chest is tight. Year-round allergic asthma. Worse with ragweed, trees, and anything pretty. Ct scan yearly had aortic aneurysm as well as liver lesions.     Weight fluctuates based on fluid.     Lower back kills her. 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 Tylenol didn't help. Naproxen helped in the past.     Patient states that she's never had high cholesterol or taken cholesterol medications.     She has yearly eye exams and is monitored for the beginning of AMD.     She had a mammogram earlier this Summer.     She sometimes has right foot pain. Arthritis all over her body.    HEALTH RISK ASSESSMENT    Recent Hospitalizations:  No hospitalization(s) within the last year.    Current Medical Providers:  Patient Care Team:  Brittany Allen MD as PCP - General (Family Medicine)  Brittany Allen MD as PCP - Cristi Moser MD as Consulting Physician (Urology)    Smoking Status:  Social History     Tobacco Use   Smoking Status Never Smoker   Smokeless Tobacco Never Used       Alcohol Consumption:  Social History     Substance and Sexual Activity   Alcohol Use Not on file       Depression Screen:   PHQ-2/PHQ-9 Depression Screening 9/24/2019   Little interest or pleasure in doing things 0   Feeling down, depressed, or hopeless 0   Total Score 0       Fall Risk Screen:  STEADI Fall Risk Assessment was completed, and patient is at LOW risk for falls.Assessment completed on:9/24/2019    Health Habits and Functional and Cognitive Screening:  Functional & Cognitive Status 9/24/2019    Do you have difficulty preparing food and eating? No   Do you have difficulty bathing yourself, getting dressed or grooming yourself? No   Do you have difficulty using the toilet? No   Do you have difficulty moving around from place to place? No   Do you have trouble with steps or getting out of a bed or a chair? No   Current Diet Well Balanced Diet   Dental Exam Up to date   Eye Exam Up to date   Exercise (times per week) 0 times per week   Current Exercise Activities Include None   Do you need help using the phone?  No   Are you deaf or do you have serious difficulty hearing?  Yes   Do you need help with transportation? No   Do you need help shopping? No   Do you need help preparing meals?  No   Do you need help with housework?  No   Do you need help with laundry? No   Do you need help taking your medications? No   Do you need help managing money? No   Do you ever drive or ride in a car without wearing a seat belt? No   Have you felt unusual stress, anger or loneliness in the last month? No   Who do you live with? Alone   If you need help, do you have trouble finding someone available to you? No   Have you been bothered in the last four weeks by sexual problems? No   Do you have difficulty concentrating, remembering or making decisions? Yes         Does the patient have evidence of cognitive impairment? No    Asprin use counseling:Does not need ASA (and currently is not on it)    Age-appropriate Screening Schedule:  Refer to the list below for future screening recommendations based on patient's age, sex and/or medical conditions. Orders for these recommended tests are listed in the plan section. The patient has been provided with a written plan.    Health Maintenance   Topic Date Due   • URINE MICROALBUMIN  1940   • ZOSTER VACCINE (2 of 3) 01/24/2010   • HEMOGLOBIN A1C  02/18/2019   • DIABETIC EYE EXAM  06/11/2019   • INFLUENZA VACCINE  08/01/2019   • TDAP/TD VACCINES (2 - Td) 12/16/2021   • PNEUMOCOCCAL  VACCINES (65+ LOW/MEDIUM RISK)  Completed          The following portions of the patient's history were reviewed and updated as appropriate: She  has a past medical history of Aortic aneurysm (CMS/HCC), Arthritis, Asthma, Environmental allergies, Fractures, GERD (gastroesophageal reflux disease), Ground glass opacity present on imaging of lung (), H/O bone density study (2016), H/O pulmonary function tests (06/15/2017), Hypertension, Liver lesion, and Shingles.  She  has a past surgical history that includes  section (1958, , 1963, 1966, ) and Wrist surgery.  Her family history includes Lung cancer in her father and sister; Obesity in her mother and sister.  She is allergic to latex..    Outpatient Medications Prior to Visit   Medication Sig Dispense Refill   • albuterol sulfate  (90 Base) MCG/ACT inhaler Inhale 2 puffs Every 4 (Four) Hours As Needed for Wheezing.     • felodipine (PLENDIL) 10 MG 24 hr tablet Take 1 tablet by mouth Daily. 90 tablet 1   • Fluticasone Furoate-Vilanterol (BREO ELLIPTA) 200-25 MCG/INH inhaler Inhale 1 puff Daily.     • lisinopril (PRINIVIL,ZESTRIL) 40 MG tablet Take 1 tablet by mouth Daily. 90 tablet 1   • montelukast (SINGULAIR) 10 MG tablet Take 10 mg by mouth Every Night.     • omeprazole (priLOSEC) 20 MG capsule Take 1 capsule by mouth Daily. 90 capsule 1   • oxybutynin XL (DITROPAN-XL) 10 MG 24 hr tablet Take 10 mg by mouth Daily.     • potassium chloride (K-DUR,KLOR-CON) 20 MEQ CR tablet Take 1 tablet by mouth 2 (Two) Times a Day. 180 tablet 1   • triamterene-hydrochlorothiazide (DYAZIDE) 37.5-25 MG per capsule Take 1 capsule by mouth Every Morning. 90 capsule 1     No facility-administered medications prior to visit.        Patient Active Problem List   Diagnosis   • Environmental allergies   • Essential hypertension   • Hypokalemia   • Gastroesophageal reflux disease   • Aortic aneurysm (CMS/HCC)   • Ground glass opacity present on imaging of  "lung   • Asthma   • Morbidly obese (CMS/[facility])       Advanced Care Planning:  Patient does not have an advance directive - not interested in additional information    Review of Systems   Constitutional: Positive for unexpected weight change.   HENT: Negative for hearing loss.    Cardiovascular: Positive for leg swelling.   Musculoskeletal: Positive for arthralgias and back pain.       Compared to one year ago, the patient feels her physical health is the same.  Compared to one year ago, the patient feels her mental health is the same.    Reviewed chart for potential of high risk medication in the elderly: not applicable  Reviewed chart for potential of harmful drug interactions in the elderly:not applicable    Objective         Vitals:    09/24/19 1414   BP: 136/82   BP Location: Left arm   Patient Position: Sitting   Cuff Size: Large Adult   Pulse: 71   SpO2: 97%   Weight: 103 kg (226 lb 3.2 oz)   Height: 157.5 cm (62\")   PainSc:   4   PainLoc: Back       Body mass index is 41.37 kg/m².  Discussed the patient's BMI with her. The BMI is above average; no BMI management plan is appropriate..    Physical Exam   Constitutional: She is oriented to person, place, and time. No distress.   HENT:   Right Ear: Tympanic membrane and ear canal normal.   Left Ear: Tympanic membrane and ear canal normal.   Nose: Nose normal.   Mouth/Throat: Oropharynx is clear and moist and mucous membranes are normal. No oral lesions.   Eyes: Right eye exhibits no discharge. Left eye exhibits no discharge.   Neck: Neck supple. No thyromegaly present.   Cardiovascular: Normal rate, regular rhythm and normal heart sounds.   No murmur heard.  Pulmonary/Chest: Effort normal and breath sounds normal.   Abdominal: Soft. Bowel sounds are normal. There is no tenderness.   Musculoskeletal: She exhibits no edema.   Lymphadenopathy:        Head (right side): No submandibular, no preauricular and no posterior auricular adenopathy present.        Head (left " side): No submandibular, no preauricular and no posterior auricular adenopathy present.     She has no cervical adenopathy.   Neurological: She is alert and oriented to person, place, and time.   Skin: Skin is warm and dry.   Psychiatric: She has a normal mood and affect. Her behavior is normal. Judgment and thought content normal.             Assessment/Plan   Medicare Risks and Personalized Health Plan  CMS Preventative Services Quick Reference  Advance Directive Discussion  Breast Cancer/Mammogram Screening  Diabetic Lab Screening   Immunizations Discussed/Encouraged (specific immunizations; Influenza and Shingrix )    Counseled on health maintenance topics and preventative care recommendations: influenza vaccine, Shingles vaccine, mammogram,     The above risks/problems have been discussed with the patient.  Pertinent information has been shared with the patient in the After Visit Summary.  Follow up plans and orders are seen below in the Assessment/Plan Section.    Diagnoses and all orders for this visit:    1. Well adult exam (Primary)  -     CBC & Differential; Future  -     Comprehensive Metabolic Panel; Future  -     Lipid Panel; Future  -     TSH Rfx On Abnormal To Free T4; Future  Obtain hepatitis vaccine records from pharmacy.  Recommend Shingrix and flu at local pharmacy.   2. Screening for breast cancer  Obtain [place] records. She declined clinical breast exam.   3. Essential hypertension  -     Comprehensive Metabolic Panel; Future  -     Lipid Panel; Future  -     TSH Rfx On Abnormal To Free T4; Future  Stable. Continue current meds.   4. Prediabetes  -     Comprehensive Metabolic Panel; Future  -     Hemoglobin A1c; Future  Last A1c 5.7. Check labs today.   5. Screening for deficiency anemia  -     CBC & Differential; Future    6. Screening for thyroid disorder  -     TSH Rfx On Abnormal To Free T4; Future    7. Ground glass opacity present on imaging of lung  -     CT Chest With Contrast;  Future  Reviewed previous CT chest 11/19/18. She hasn't followed up with pulmonary. Check CT chest.   8. Morbidly obese (CMS/HCC)      Follow Up:  Return in about 5 months (around 2/24/2020) for Follow-up, AWV 1 year.     An After Visit Summary and PPPS were given to the patient.

## 2019-09-25 PROBLEM — R73.03 PREDIABETES: Status: ACTIVE | Noted: 2019-09-25

## 2019-09-30 ENCOUNTER — HOSPITAL ENCOUNTER (OUTPATIENT)
Dept: CT IMAGING | Facility: HOSPITAL | Age: 79
Discharge: HOME | End: 2019-09-30
Admitting: FAMILY MEDICINE

## 2019-09-30 DIAGNOSIS — R91.8 GROUND GLASS OPACITY PRESENT ON IMAGING OF LUNG: ICD-10-CM

## 2019-09-30 PROCEDURE — 25010000002 IOPAMIDOL 61 % SOLUTION: Performed by: FAMILY MEDICINE

## 2019-09-30 PROCEDURE — 71260 CT THORAX DX C+: CPT

## 2019-09-30 RX ADMIN — IOPAMIDOL 100 ML: 612 INJECTION, SOLUTION INTRAVENOUS at 11:28

## 2019-10-01 ENCOUNTER — TELEPHONE (OUTPATIENT)
Dept: FAMILY MEDICINE CLINIC | Facility: CLINIC | Age: 79
End: 2019-10-01

## 2019-12-31 ENCOUNTER — OFFICE VISIT (OUTPATIENT)
Dept: FAMILY MEDICINE CLINIC | Facility: CLINIC | Age: 79
End: 2019-12-31

## 2019-12-31 VITALS
OXYGEN SATURATION: 95 % | TEMPERATURE: 99.4 F | BODY MASS INDEX: 40.26 KG/M2 | HEART RATE: 85 BPM | HEIGHT: 63 IN | DIASTOLIC BLOOD PRESSURE: 84 MMHG | WEIGHT: 227.2 LBS | SYSTOLIC BLOOD PRESSURE: 168 MMHG

## 2019-12-31 DIAGNOSIS — J45.51 SEVERE PERSISTENT ASTHMA WITH ACUTE EXACERBATION: Primary | ICD-10-CM

## 2019-12-31 DIAGNOSIS — I10 ESSENTIAL HYPERTENSION: ICD-10-CM

## 2019-12-31 PROCEDURE — 99214 OFFICE O/P EST MOD 30 MIN: CPT | Performed by: FAMILY MEDICINE

## 2019-12-31 RX ORDER — PREDNISONE 20 MG/1
40 TABLET ORAL DAILY
Qty: 10 TABLET | Refills: 0 | Status: SHIPPED | OUTPATIENT
Start: 2019-12-31 | End: 2020-01-05

## 2019-12-31 RX ORDER — AZITHROMYCIN 250 MG/1
TABLET, FILM COATED ORAL
Qty: 6 TABLET | Refills: 0 | Status: SHIPPED | OUTPATIENT
Start: 2019-12-31 | End: 2020-01-21

## 2019-12-31 NOTE — PROGRESS NOTES
Chief Complaint   Patient presents with   • Cough     dry cough, sometimes she is able to cough mucus up but most of the time she can't        Cough   This is a recurrent problem. The current episode started more than 1 month ago. The problem has been gradually improving. The cough is non-productive. Associated symptoms include chest pain, chills, headaches, shortness of breath and wheezing. She has tried steroid inhaler and a beta-agonist inhaler (doxycycline, tessalon) for the symptoms. Her past medical history is significant for asthma.        Patient presented to the urgent care on 1217 and was treated with doxycycline and Tessalon, chest x-ray at that time did not show pneumonia. Hurts in chest to her back from coughing. She doesn't breathe real well all the time. Albuterol nebulizer treatments 2-3 times a day, but let up now. Felt better until 4 days ago, horrible ever since. Hasn't see pulmonologist in nearly a year. Yesterday freezing all day.           Review of Systems   Constitutional: Positive for chills and fatigue.   Respiratory: Positive for cough, shortness of breath and wheezing.    Cardiovascular: Positive for chest pain.   Musculoskeletal: Positive for back pain.        Current Outpatient Medications on File Prior to Visit   Medication Sig Dispense Refill   • albuterol (ACCUNEB) 0.63 MG/3ML nebulizer solution Take 3 mL by nebulization Every 6 (Six) Hours As Needed for Wheezing. 24 vial 1   • albuterol sulfate  (90 Base) MCG/ACT inhaler Inhale 2 puffs Every 4 (Four) Hours As Needed for Wheezing.     • benzonatate (TESSALON) 200 MG capsule Take 1 capsule by mouth 3 (Three) Times a Day As Needed for Cough. 24 capsule 1   • felodipine (PLENDIL) 10 MG 24 hr tablet Take 1 tablet by mouth Daily. 90 tablet 1   • Fluticasone Furoate-Vilanterol (BREO ELLIPTA) 200-25 MCG/INH inhaler Inhale 1 puff Daily.     • lisinopril (PRINIVIL,ZESTRIL) 40 MG tablet Take 1 tablet by mouth Daily. 90 tablet 1   •  montelukast (SINGULAIR) 10 MG tablet Take 10 mg by mouth Every Night.     • omeprazole (priLOSEC) 20 MG capsule Take 1 capsule by mouth Daily. 90 capsule 1   • oxybutynin XL (DITROPAN-XL) 10 MG 24 hr tablet Take 10 mg by mouth Daily.     • potassium chloride (K-DUR,KLOR-CON) 20 MEQ CR tablet Take 1 tablet by mouth 2 (Two) Times a Day. 180 tablet 1   • triamterene-hydrochlorothiazide (DYAZIDE) 37.5-25 MG per capsule Take 1 capsule by mouth Every Morning. 90 capsule 1   • [DISCONTINUED] doxycycline (MONODOX) 100 MG capsule Take 1 capsule by mouth 2 (Two) Times a Day. 20 capsule 0     No current facility-administered medications on file prior to visit.        Allergies   Allergen Reactions   • Latex Rash       Past Medical History:   Diagnosis Date   • Aortic aneurysm (CMS/HCC)    • Arthritis    • Asthma    • Environmental allergies     dust, pollen, trees, grass   • Fractures    • GERD (gastroesophageal reflux disease)    • Ground glass opacity present on imaging of lung    • H/O bone density study 2016    normal   • H/O pulmonary function tests 06/15/2017    normal   • Hepatic cyst    • Hepatic steatosis    • Hypertension    • Shingles     age 17-16   • BLAIR (stress urinary incontinence, female)         Past Surgical History:   Procedure Laterality Date   •  SECTION  8, , , ,    • TRANSVAGINAL TAPING SUSPENSION WITH OBTURATOR  09/15/2009   • WRIST SURGERY          Family History   Problem Relation Age of Onset   • Obesity Mother    • Lung cancer Father    • Lung cancer Sister    • Obesity Sister         Social History     Socioeconomic History   • Marital status: Single     Spouse name: Not on file   • Number of children: Not on file   • Years of education: Not on file   • Highest education level: Not on file   Occupational History     Comment: works with mentally challenged adults   Tobacco Use   • Smoking status: Never Smoker   • Smokeless tobacco: Never Used   Substance and  "Sexual Activity   • Drug use: No        Visit Vitals  /84 (BP Location: Left arm, Patient Position: Sitting, Cuff Size: Large Adult)   Pulse 85   Temp 99.4 °F (37.4 °C) (Temporal)   Ht 160 cm (63\")   Wt 103 kg (227 lb 3.2 oz)   SpO2 95%   BMI 40.25 kg/m²        Physical Exam   Constitutional: No distress.   Cardiovascular: Normal rate and regular rhythm.   No murmur heard.  Pulmonary/Chest: Effort normal. No respiratory distress. She has wheezes ( KALI).   Nonproductive, barking cough   Psychiatric: She has a normal mood and affect.   Vitals reviewed.      Xr Chest 2 View    Result Date: 12/17/2019  Negative chest. Signer Name: Kurt Benjamin MD  Signed: 12/17/2019 10:21 PM  Workstation Name: [email or web address]  [facility]    Ct Chest With Contrast    Result Date: 10/3/2019  1. Areas of scarring and atelectasis in the midlungs lingular segment greatest involvement without soft tissue nodular component, centrilobular nodularity or bronchovascular thickening most likely postinfectious/postinflammatory etiology with result in scarring. Lung RADS category 1 with recommended annual followup. 2. Multiple low-attenuation lesions of hepatic cysts throughout a background hepatic steatosis parenchyma.  D:  09/30/2019 E:  10/01/2019  This report was finalized on 10/3/2019 6:54 PM by Dr. Raleigh Berg.         Mariela was seen today for cough.    Diagnoses and all orders for this visit:    Severe persistent asthma with acute exacerbation  -     Ambulatory Referral to Pulmonology  -     predniSONE (DELTASONE) 20 MG tablet; Take 2 tablets by mouth Daily for 5 days.  -     azithromycin (ZITHROMAX) 250 MG tablet; Take 2 tablets the first day, then 1 tablet daily for 4 days.  Uncontrolled.  At this time treat with antibiotics and steroids.  Continue albuterol.  Continue Breo.  Patient has not seen pulmonology in some time and this is her second exacerbation within a month.  She is on inhaled " corticosteroid/ Laba combo.  She is interested in switching to [facility] and referral placed.  Work note provided.  Essential hypertension  Uncontrolled.  Patient noncompliant with medications.  Encouraged her to take her medications as prescribed.  Discussed her blood pressure is extremely elevated but she is not concerned.      Return if symptoms worsen or fail to improve.    Dr. Brittany Ledezma

## 2020-01-21 ENCOUNTER — OFFICE VISIT (OUTPATIENT)
Dept: PULMONOLOGY | Facility: CLINIC | Age: 80
End: 2020-01-21

## 2020-01-21 VITALS
TEMPERATURE: 98.4 F | WEIGHT: 227.4 LBS | OXYGEN SATURATION: 95 % | HEART RATE: 72 BPM | SYSTOLIC BLOOD PRESSURE: 132 MMHG | BODY MASS INDEX: 40.29 KG/M2 | DIASTOLIC BLOOD PRESSURE: 80 MMHG | HEIGHT: 63 IN

## 2020-01-21 DIAGNOSIS — J45.40 MODERATE PERSISTENT ASTHMA WITHOUT COMPLICATION: Primary | ICD-10-CM

## 2020-01-21 DIAGNOSIS — J30.2 SEASONAL ALLERGIC RHINITIS, UNSPECIFIED TRIGGER: ICD-10-CM

## 2020-01-21 DIAGNOSIS — K21.9 GASTROESOPHAGEAL REFLUX DISEASE, ESOPHAGITIS PRESENCE NOT SPECIFIED: ICD-10-CM

## 2020-01-21 DIAGNOSIS — R05.9 COUGH: ICD-10-CM

## 2020-01-21 PROCEDURE — 94729 DIFFUSING CAPACITY: CPT | Performed by: INTERNAL MEDICINE

## 2020-01-21 PROCEDURE — 94375 RESPIRATORY FLOW VOLUME LOOP: CPT | Performed by: INTERNAL MEDICINE

## 2020-01-21 PROCEDURE — 94726 PLETHYSMOGRAPHY LUNG VOLUMES: CPT | Performed by: INTERNAL MEDICINE

## 2020-01-21 PROCEDURE — 99204 OFFICE O/P NEW MOD 45 MIN: CPT | Performed by: INTERNAL MEDICINE

## 2020-01-21 RX ORDER — ALBUTEROL SULFATE 90 UG/1
2 AEROSOL, METERED RESPIRATORY (INHALATION) EVERY 4 HOURS PRN
Qty: 18 G | Refills: 6 | Status: SHIPPED | OUTPATIENT
Start: 2020-01-21

## 2020-01-21 NOTE — PROGRESS NOTES
Initial Pulmonary Consult Note    Subjective   Reason for consultation/Chief Complaint: Cough    Mariela Long is a 79 y.o. female is being seen for consultation today at the request of Brittany Kelley*    History of Present Illness    Ms. Long is a 78yo F with a history of asthma who is referred for cough. She first developed this episode of cough on 12/5/19 after attending the [employer]. The smoke from the [employer] caused wheezing. She did not have her albuterol inhaler with her at this time. She was then seen by her pcp and treated with a course of Azithromycin and Steroids which she thinks helped but she continues to cough. She is currently using Breo 100 and PRN Albuterol. She uses her Albuterol a couple of times per week. She has seasonal allergies and is allergic to grasses. She also has acid reflux and is currently on a PPI. She sleeps upright in a recliner. She does not think that she eats too close to bedtime but she does drink liquids up until she goes to bed. She thinks that the cough is worse at night and in the early morning. She denies any post nasal drainage.     Active Ambulatory Problems     Diagnosis Date Noted   • Environmental allergies    • Essential hypertension 02/18/2019   • Hypokalemia 02/18/2019   • Gastroesophageal reflux disease 02/18/2019   • Aortic aneurysm (CMS/HCC)    • Ground glass opacity present on imaging of lung 01/01/2017   • Asthma    • Morbidly obese (CMS/HCC) 09/24/2019   • Prediabetes 09/25/2019   • Cough 01/21/2020   • Seasonal allergic rhinitis 01/21/2020     Resolved Ambulatory Problems     Diagnosis Date Noted   • No Resolved Ambulatory Problems     Past Medical History:   Diagnosis Date   • Arthritis    • Fractures    • GERD (gastroesophageal reflux disease)    • H/O bone density study 05/05/2016   • H/O pulmonary function tests 06/15/2017   • Hepatic cyst    • Hepatic steatosis    • Hypertension    • Shingles    • BLAIR (stress urinary  incontinence, female)        Past Surgical History:   Procedure Laterality Date   •  SECTION  8, , , ,    • TRANSVAGINAL TAPING SUSPENSION WITH OBTURATOR  09/15/2009   • WRIST SURGERY         Family History   Problem Relation Age of Onset   • Obesity Mother    • Lung cancer Father    • Lung cancer Sister    • Obesity Sister        Social History     Socioeconomic History   • Marital status: Single     Spouse name: Not on file   • Number of children: Not on file   • Years of education: Not on file   • Highest education level: Not on file   Occupational History     Comment: works with mentally challenged adults   Tobacco Use   • Smoking status: Never Smoker   • Smokeless tobacco: Never Used   Substance and Sexual Activity   • Drug use: No       Allergies   Allergen Reactions   • Latex Rash       Current Outpatient Medications   Medication Sig Dispense Refill   • albuterol (ACCUNEB) 0.63 MG/3ML nebulizer solution Take 3 mL by nebulization Every 6 (Six) Hours As Needed for Wheezing. 24 vial 1   • albuterol sulfate  (90 Base) MCG/ACT inhaler Inhale 2 puffs Every 4 (Four) Hours As Needed for Wheezing. 18 g 6   • benzonatate (TESSALON) 200 MG capsule Take 1 capsule by mouth 3 (Three) Times a Day As Needed for Cough. 24 capsule 1   • felodipine (PLENDIL) 10 MG 24 hr tablet Take 1 tablet by mouth Daily. 90 tablet 1   • lisinopril (PRINIVIL,ZESTRIL) 40 MG tablet Take 1 tablet by mouth Daily. 90 tablet 1   • montelukast (SINGULAIR) 10 MG tablet Take 10 mg by mouth Every Night.     • omeprazole (priLOSEC) 20 MG capsule Take 1 capsule by mouth Daily. 90 capsule 1   • oxybutynin XL (DITROPAN-XL) 10 MG 24 hr tablet Take 10 mg by mouth Daily.     • potassium chloride (K-DUR,KLOR-CON) 20 MEQ CR tablet Take 1 tablet by mouth 2 (Two) Times a Day. 180 tablet 1   • triamterene-hydrochlorothiazide (DYAZIDE) 37.5-25 MG per capsule Take 1 capsule by mouth Every Morning. 90 capsule 1   • Fluticasone  "Furoate-Vilanterol (BREO ELLIPTA) 200-25 MCG/INH inhaler Inhale 1 puff Daily. 1 each 11     No current facility-administered medications for this visit.        Review of Systems   Constitutional: Positive for fatigue.   HENT: Negative.    Eyes: Negative.    Respiratory: Positive for cough, shortness of breath and wheezing.    Cardiovascular: Positive for leg swelling.   Gastrointestinal: Negative.    Endocrine: Negative.    Genitourinary: Negative.    Musculoskeletal: Negative.    Skin: Negative.    Allergic/Immunologic: Positive for environmental allergies.   Neurological: Negative.    Hematological: Negative.    Psychiatric/Behavioral: Negative.          Objective   Blood pressure 132/80, pulse 72, temperature 98.4 °F (36.9 °C), height 160 cm (63\"), weight 103 kg (227 lb 6.4 oz), SpO2 95 %, not currently breastfeeding.  Physical Exam   Constitutional: She is oriented to person, place, and time. She appears well-developed and well-nourished. No distress.   HENT:   Head: Normocephalic and atraumatic.   Mouth/Throat: Oropharynx is clear and moist.   Eyes: Pupils are equal, round, and reactive to light. Conjunctivae are normal. No scleral icterus.   Neck: Normal range of motion. Neck supple. No tracheal deviation present. No thyromegaly present.   Cardiovascular: Normal rate, regular rhythm and normal heart sounds.   Pulmonary/Chest: Effort normal and breath sounds normal. No respiratory distress.   Abdominal: Soft. Bowel sounds are normal. There is no tenderness.   Musculoskeletal: Normal range of motion. She exhibits no edema.   Lymphadenopathy:     She has no cervical adenopathy.   Neurological: She is alert and oriented to person, place, and time. She exhibits normal muscle tone. Coordination normal.   Skin: Skin is warm and dry. No rash noted. No erythema.   Psychiatric: She has a normal mood and affect. Her speech is normal and behavior is normal. Judgment normal.   Nursing note and vitals " reviewed.      PFTs:  Performed in clinic and personally reviewed.   The spirometry is normal.  The lung volumes are normal.  The DLCO is normal.     Imaging:  I have reviewed her CXR from 12/17/19. The lungs are clear bilaterally. There is no acute process.     Assessment/Plan   Mariela was seen today for asthma.    Diagnoses and all orders for this visit:    Moderate persistent asthma without complication  -     Pulmonary Function Test    Cough    Seasonal allergic rhinitis, unspecified trigger    Gastroesophageal reflux disease, esophagitis presence not specified    Other orders  -     albuterol sulfate  (90 Base) MCG/ACT inhaler; Inhale 2 puffs Every 4 (Four) Hours As Needed for Wheezing.  -     Fluticasone Furoate-Vilanterol (BREO ELLIPTA) 200-25 MCG/INH inhaler; Inhale 1 puff Daily.        Discussion:  Ms. Long is a 80yo F who is referred for asthma and cough.     1. Moderate Persistent Asthma  - She still has frequent rescue inhaler usage.   - We will increase her Breo to 200. I have sent a new Rx to her mail-order per her request.   - Continue Albuterol as needed.   - I have reviewed her most recent CBC and she does not have significant eosinophilia to qualify for a biologic agent at this time. If she continues to require multiple rounds of steroids, she may qualify for Dupixent.     2. Cough  - Likely secondary to asthma and acid reflux.   - Treatment of the underlying conditions.     3. Allergic Rhinitis  - Seasonal and allergic to grasses.   - Continue Singulair.     4. GERD  - Continue PPI  - Counseled on lifestyle modification such as avoiding trigger foods and not eating too close to bedtime.     Return to clinic in 4 months. Instructed to call if she needs to be seen sooner.      I personally spent over half of a total 45 minutes face to face with the patient in counseling and discussion and/or coordination of care as described above.       Carine Mathur, DO  Pulmonary and Critical Care  Medicine  Note Electronically Signed

## 2020-03-10 DIAGNOSIS — I10 ESSENTIAL HYPERTENSION: ICD-10-CM

## 2020-03-10 DIAGNOSIS — E87.6 HYPOKALEMIA: ICD-10-CM

## 2020-03-10 DIAGNOSIS — K21.9 GASTROESOPHAGEAL REFLUX DISEASE, ESOPHAGITIS PRESENCE NOT SPECIFIED: ICD-10-CM

## 2020-03-10 RX ORDER — POTASSIUM CHLORIDE 20 MEQ/1
TABLET, EXTENDED RELEASE ORAL
Qty: 180 TABLET | Refills: 1 | Status: SHIPPED | OUTPATIENT
Start: 2020-03-10 | End: 2020-09-28 | Stop reason: SDUPTHER

## 2020-03-10 RX ORDER — OMEPRAZOLE 20 MG/1
20 CAPSULE, DELAYED RELEASE ORAL DAILY
Qty: 90 CAPSULE | Refills: 1 | Status: SHIPPED | OUTPATIENT
Start: 2020-03-10 | End: 2020-09-28 | Stop reason: SDUPTHER

## 2020-03-10 RX ORDER — LISINOPRIL 40 MG/1
40 TABLET ORAL DAILY
Qty: 90 TABLET | Refills: 1 | Status: SHIPPED | OUTPATIENT
Start: 2020-03-10 | End: 2020-09-28 | Stop reason: SDUPTHER

## 2020-09-04 ENCOUNTER — READMISSION MANAGEMENT (OUTPATIENT)
Dept: CALL CENTER | Facility: HOSPITAL | Age: 80
End: 2020-09-04

## 2020-09-04 ENCOUNTER — TRANSITIONAL CARE MANAGEMENT TELEPHONE ENCOUNTER (OUTPATIENT)
Dept: CALL CENTER | Facility: HOSPITAL | Age: 80
End: 2020-09-04

## 2020-09-04 NOTE — OUTREACH NOTE
Call Center TCM Note      Responses   [facility] patient discharged from?  Non-   Does the patient have one of the following disease processes/diagnoses(primary or secondary)?  Other   TCM attempt successful?  Yes   Call start time  1446   Call end time  1449   Discharge diagnosis  UTI    Meds reviewed with patient/caregiver?  Yes   Is the patient having any side effects they believe may be caused by any medication additions or changes?  No   Does the patient have all medications ordered at discharge?  Yes   Is the patient taking all medications as directed (includes completed medication regime)?  Yes   Comments regarding appointments  Pt has daily appt with ID for IV ABX   Does the patient have a primary care provider?   Yes   Does the patient have an appointment with their PCP within 7 days of discharge?  Yes   Comments regarding PCP  Hospital d/c f/u appt on 9/9/20 at 2:45 pm with Owen Sanchez   Has the patient kept scheduled appointments due by today?  Yes   Pulse Ox monitoring  None   Psychosocial issues?  No   Did the patient receive a copy of their discharge instructions?  Yes [From [facility]]   Nursing interventions  Reviewed instructions with patient   What is the patient's perception of their health status since discharge?  Improving   Is the patient/caregiver able to teach back signs and symptoms related to disease process for when to call PCP?  Yes   Is the patient/caregiver able to teach back signs and symptoms related to disease process for when to call 911?  Yes   Is the patient/caregiver able to teach back the hierarchy of who to call/visit for symptoms/problems? PCP, Specialist, Home health nurse, Urgent Care, ED, 911  Yes   If the patient is a current smoker, are they able to teach back resources for cessation?  -- [Nonsmoker]   TCM call completed?  Yes          Harriett Pineda RN    9/4/2020, 14:50

## 2020-09-04 NOTE — OUTREACH NOTE
Call Center TCM Note      Responses   [facility] patient discharged from?  Non-   Does the patient have one of the following disease processes/diagnoses(primary or secondary)?  Other   TCM attempt successful?  No   Unsuccessful attempts  Attempt 1          Quincy Martinez RN    9/4/2020, 14:24

## 2020-09-04 NOTE — OUTREACH NOTE
Prep Survey      Responses   [facility] patient discharged from?  Non-BH   Is LACE score < 7 ?  Non- Discharge   Eligibility  [facility]   Date of Admission  08/30/20   Date of Discharge  09/02/20   Does the patient have one of the following disease processes/diagnoses(primary or secondary)?  Other   Prep survey completed?  Yes          Naomi Matias RN

## 2020-09-25 NOTE — PROGRESS NOTES
Pulmonary Office Follow Up      Subjective   Chief Complaint: Cough    Mariela Long is a 79 y.o. female is being seen in follow up for Asthma    History of Present Illness    Ms. Long is a 78yo F with a history of asthma who was referred for persistent cough. She was last seen in clinic on 1/21/20. At that time, her Breo was increased to 200 and a new Rx was sent. She was counseled on reflux precautions.     Since she was last seen, she notes that she was hospitalized a few weeks ago for sepsis secondary to a UTI and bacteremia. She was treated with outpatient antibiotics by Dr. Fish after discharge. She notes that she is still fatigued but is trying to exercise. She is not quite back to baseline yet. She has not needed to be treated with any steroids for asthma exacerbation since her last visit. She is using her Albuterol inhaler but not daily. She continues to have a chronic cough but this has not worsened. She notes allergies to Ragweed which are flared up now. She continues to use Singulair.     The following portions of the patient's history were reviewed and updated as appropriate: allergies, current medications, past family history, past medical history, past social history, past surgical history and problem list.    Review of Systems   Constitutional: Positive for chills, fatigue and fever.   HENT: Positive for congestion, nosebleeds, sinus pressure, sore throat and voice change.    Eyes: Negative.    Respiratory: Positive for cough, shortness of breath and wheezing.    Cardiovascular: Positive for leg swelling.   Gastrointestinal: Positive for diarrhea.   Endocrine: Negative.    Genitourinary: Negative.    Musculoskeletal: Positive for back pain and gait problem.   Skin: Negative.    Allergic/Immunologic: Positive for environmental allergies.   Neurological: Positive for dizziness.   Hematological: Negative.    Psychiatric/Behavioral: Negative.          Objective   Blood pressure 144/90, pulse 62,  "temperature 97.1 °F (36.2 °C), resp. rate 16, height 160 cm (63\"), weight 105 kg (232 lb 8 oz), SpO2 95 %, not currently breastfeeding.  Physical Exam  Vitals signs and nursing note reviewed.   Constitutional:       General: She is not in acute distress.     Appearance: She is well-developed.   HENT:      Head: Normocephalic and atraumatic.   Eyes:      General: No scleral icterus.     Conjunctiva/sclera: Conjunctivae normal.      Pupils: Pupils are equal, round, and reactive to light.   Neck:      Musculoskeletal: Normal range of motion and neck supple.      Thyroid: No thyromegaly.      Trachea: No tracheal deviation.   Cardiovascular:      Rate and Rhythm: Normal rate and regular rhythm.      Heart sounds: Normal heart sounds.   Pulmonary:      Effort: Pulmonary effort is normal. No respiratory distress.      Breath sounds: Normal breath sounds.   Abdominal:      General: Bowel sounds are normal.      Palpations: Abdomen is soft.      Tenderness: There is no abdominal tenderness.   Musculoskeletal: Normal range of motion.      Right lower leg: No edema.      Left lower leg: No edema.   Lymphadenopathy:      Cervical: No cervical adenopathy.   Skin:     General: Skin is warm and dry.      Findings: No erythema or rash.   Neurological:      Mental Status: She is alert and oriented to person, place, and time.      Motor: No abnormal muscle tone.      Coordination: Coordination normal.   Psychiatric:         Speech: Speech normal.         Behavior: Behavior normal.         Judgment: Judgment normal.         PFTs:  No new PFTs.     Imaging:  No new imaging.     Assessment/Plan   Mariela was seen today for asthma w/out complication.    Diagnoses and all orders for this visit:    Moderate persistent asthma without complication    Cough    Seasonal allergic rhinitis, unspecified trigger    Gastroesophageal reflux disease, esophagitis presence not specified    Other orders  -     Fluticasone Furoate-Vilanterol (BREO " ELLIPTA) 200-25 MCG/INH inhaler; Inhale 1 puff Daily.        Discussion:  Ms. Long is a 78yo F who is followed for asthma and cough.      1. Moderate Persistent Asthma  - Continue Breo to 200. I have sent a new Rx to her mail-order per her request.   - Continue Albuterol as needed.   - No exacerbations since her last visit.   - She does not have significant eosinophilia to qualify for a biologic agent at this time. If she needs multiple rounds of steroids, she may qualify for Dupixent.      2. Cough  - Likely secondary to asthma and acid reflux.   - Treatment of the underlying conditions.      3. Allergic Rhinitis  - Seasonal and allergic to grasses.   - Continue Singulair.      4. GERD  - Continue PPI  - Counseled on lifestyle modification such as avoiding trigger foods and not eating too close to bedtime.        I have spent 25 minutes face to face with the patient with greater than 50% of the time spent counseling on management of asthma and current treatment.       Carine V Case, DO  Pulmonary and Critical Care Medicine  Note Electronically Signed

## 2020-09-28 ENCOUNTER — OFFICE VISIT (OUTPATIENT)
Dept: FAMILY MEDICINE CLINIC | Facility: CLINIC | Age: 80
End: 2020-09-28

## 2020-09-28 ENCOUNTER — OFFICE VISIT (OUTPATIENT)
Dept: PULMONOLOGY | Facility: CLINIC | Age: 80
End: 2020-09-28

## 2020-09-28 VITALS
DIASTOLIC BLOOD PRESSURE: 82 MMHG | WEIGHT: 233.4 LBS | SYSTOLIC BLOOD PRESSURE: 136 MMHG | BODY MASS INDEX: 41.36 KG/M2 | OXYGEN SATURATION: 95 % | HEART RATE: 64 BPM | HEIGHT: 63 IN

## 2020-09-28 VITALS
OXYGEN SATURATION: 95 % | DIASTOLIC BLOOD PRESSURE: 90 MMHG | TEMPERATURE: 97.1 F | BODY MASS INDEX: 41.2 KG/M2 | WEIGHT: 232.5 LBS | SYSTOLIC BLOOD PRESSURE: 144 MMHG | RESPIRATION RATE: 16 BRPM | HEART RATE: 62 BPM | HEIGHT: 63 IN

## 2020-09-28 DIAGNOSIS — N76.0 ACUTE VAGINITIS: ICD-10-CM

## 2020-09-28 DIAGNOSIS — I10 ESSENTIAL HYPERTENSION: ICD-10-CM

## 2020-09-28 DIAGNOSIS — J45.40 MODERATE PERSISTENT ASTHMA WITHOUT COMPLICATION: Primary | ICD-10-CM

## 2020-09-28 DIAGNOSIS — E66.01 MORBIDLY OBESE (HCC): ICD-10-CM

## 2020-09-28 DIAGNOSIS — Z00.00 WELL ADULT EXAM: Primary | ICD-10-CM

## 2020-09-28 DIAGNOSIS — K21.9 GASTROESOPHAGEAL REFLUX DISEASE, ESOPHAGITIS PRESENCE NOT SPECIFIED: ICD-10-CM

## 2020-09-28 DIAGNOSIS — E87.6 HYPOKALEMIA: ICD-10-CM

## 2020-09-28 DIAGNOSIS — R05.9 COUGH: ICD-10-CM

## 2020-09-28 DIAGNOSIS — Z13.0 SCREENING FOR DEFICIENCY ANEMIA: ICD-10-CM

## 2020-09-28 DIAGNOSIS — J30.2 SEASONAL ALLERGIC RHINITIS, UNSPECIFIED TRIGGER: ICD-10-CM

## 2020-09-28 DIAGNOSIS — Z23 FLU VACCINE NEED: ICD-10-CM

## 2020-09-28 DIAGNOSIS — R73.03 PREDIABETES: ICD-10-CM

## 2020-09-28 PROCEDURE — G0439 PPPS, SUBSEQ VISIT: HCPCS | Performed by: FAMILY MEDICINE

## 2020-09-28 PROCEDURE — 99214 OFFICE O/P EST MOD 30 MIN: CPT | Performed by: INTERNAL MEDICINE

## 2020-09-28 RX ORDER — OXYBUTYNIN CHLORIDE 15 MG/1
TABLET, EXTENDED RELEASE ORAL
COMMUNITY

## 2020-09-28 RX ORDER — FLUCONAZOLE 150 MG/1
150 TABLET ORAL ONCE
Qty: 2 TABLET | Refills: 0 | Status: SHIPPED | OUTPATIENT
Start: 2020-09-28 | End: 2020-09-28

## 2020-09-28 RX ORDER — OMEPRAZOLE 20 MG/1
20 CAPSULE, DELAYED RELEASE ORAL DAILY
Qty: 90 CAPSULE | Refills: 3 | Status: SHIPPED | OUTPATIENT
Start: 2020-09-28 | End: 2022-11-14 | Stop reason: SDUPTHER

## 2020-09-28 RX ORDER — POTASSIUM CHLORIDE 20 MEQ/1
20 TABLET, EXTENDED RELEASE ORAL 2 TIMES DAILY
Qty: 180 TABLET | Refills: 3 | Status: SHIPPED | OUTPATIENT
Start: 2020-09-28 | End: 2021-09-30 | Stop reason: SDUPTHER

## 2020-09-28 RX ORDER — LISINOPRIL 40 MG/1
40 TABLET ORAL NIGHTLY
Qty: 90 TABLET | Refills: 1 | Status: SHIPPED | OUTPATIENT
Start: 2020-09-28 | End: 2021-03-29 | Stop reason: SDUPTHER

## 2020-09-28 RX ORDER — TRIAMTERENE AND HYDROCHLOROTHIAZIDE 37.5; 25 MG/1; MG/1
1 CAPSULE ORAL NIGHTLY
Qty: 90 CAPSULE | Refills: 1 | Status: SHIPPED | OUTPATIENT
Start: 2020-09-28 | End: 2021-03-29 | Stop reason: SDUPTHER

## 2020-09-28 RX ORDER — FELODIPINE 10 MG/1
10 TABLET, EXTENDED RELEASE ORAL NIGHTLY
Qty: 90 TABLET | Refills: 1 | Status: SHIPPED | OUTPATIENT
Start: 2020-09-28 | End: 2021-03-29 | Stop reason: SDUPTHER

## 2020-09-28 NOTE — PROGRESS NOTES
The ABCs of the Annual Wellness Visit  Subsequent Medicare Wellness Visit    Chief Complaint   Patient presents with   • Medicare Wellness-subsequent     patient is not fasting for blood work       Subjective   History of Present Illness:  Mariela Long is a 79 y.o. female who presents for a Subsequent Medicare Wellness Visit.    She walks from time to time other than working. She eats a lot of wrong things. Home blood pressure monitoring when she thinks about it. Blood pressure runs high when she feels like its up. 200/100, 180/90. Takes her medications in the evening.  She regularly takes lisinopril but only takes the Dyazide when she has leg swelling and does not take the felodipine very often.  She doesn't take diuretic daily because it makes her weak. Legs get swollen. Vaginal discharge with itching sometimes. OTC medication not helping.       HEALTH RISK ASSESSMENT    Recent Hospitalizations:  Recently treated at the following:  Other: [facility] 3 weeks ago. She had chills one morning after work, thought she was dying. Went to ER and ran tests. Diagnosed with UTI, blood infection, WBC high, pneumonia, sepsis. She had IV antibiotics for 3 days. Another week and half with outpatient IV antibiotics.     Current Medical Providers:  Patient Care Team:  Brittany Allen MD as PCP - General (Family Medicine)  Brittany Allen MD as PCP - [facility]  Cristi Montana MD as Consulting Physician (Urology)  Carine Mathur DO as Consulting Physician (Pulmonary Disease)  Lalo Fish MD as Consulting Physician (Infectious Diseases)    Smoking Status:  Social History     Tobacco Use   Smoking Status Never Smoker   Smokeless Tobacco Never Used       Alcohol Consumption:  Social History     Substance and Sexual Activity   Alcohol Use None       Depression Screen:   PHQ-2/PHQ-9 Depression Screening 9/28/2020   Little interest or pleasure in doing things 0   Feeling down,  depressed, or hopeless 0   Total Score 0       Fall Risk Screen:  [facility], and patient is at LOW risk for falls.Assessment completed on:9/28/2020   [facility] Fall Risk Clinician Key Questions   Have you fallen in the past year?: No  Do you feel unsteady with walking?: No  Are you worried about falling?: No    Stay Idependant Patient Questions   Have you been advised to use a cane or a walker to get around safely?: No  Do you steady yourself by holding onto furniture when walking at home?: No  Do you need to push with your hands to stand up from a chair?: No  Do you have trouble stepping up onto a curb?: No  Do you have to rush to the toilet?: No  Have you lost some feeling in your feet?: No  Do you take medicine that sometimes makes you feel light headed or more tired than usual?: No  Do you take medicine to help you sleep or improve your mood?: No  Do you often feel sad or depressed?: No  Patient Fall Risk Assessment Score : 0        Health Habits and Functional and Cognitive Screening:  Functional & Cognitive Status 9/28/2020   Do you have difficulty preparing food and eating? No   Do you have difficulty bathing yourself, getting dressed or grooming yourself? No   Do you have difficulty using the toilet? No   Do you have difficulty moving around from place to place? No   Do you have trouble with steps or getting out of a bed or a chair? No   Current Diet Well Balanced Diet   Dental Exam Up to date   Eye Exam Up to date   Exercise (times per week) 0 times per week   Current Exercise Activities Include None   Do you need help using the phone?  No   Are you deaf or do you have serious difficulty hearing?  No   Do you need help with transportation? No   Do you need help shopping? No   Do you need help preparing meals?  No   Do you need help with housework?  No   Do you need help with laundry? No   Do you need help taking your medications? No   Do you need help managing money? No   Do you  ever drive or ride in a car without wearing a seat belt? No   Have you felt unusual stress, anger or loneliness in the last month? No   Who do you live with? Alone   If you need help, do you have trouble finding someone available to you? No   Have you been bothered in the last four weeks by sexual problems? No   Do you have difficulty concentrating, remembering or making decisions? No         Does the patient have evidence of cognitive impairment? No    Asprin use counseling:Does not need ASA (and currently is not on it)    Age-appropriate Screening Schedule:  Refer to the list below for future screening recommendations based on patient's age, sex and/or medical conditions. Orders for these recommended tests are listed in the plan section. The patient has been provided with a written plan.    Health Maintenance   Topic Date Due   • ZOSTER VACCINE (2 of 3) 2010   • HEMOGLOBIN A1C  2020   • INFLUENZA VACCINE  2020   • TDAP/TD VACCINES (2 - Td) 2021          Immunization History   Administered Date(s) Administered   • Fluzone High Dose =>65 Years ([employer] ONLY) 10/26/2016, 10/19/2017, 11/15/2017   • Hepatitis A 2019, 10/03/2019   • Pneumococcal Conjugate 13-Valent (PCV13) 2017   • Pneumococcal Polysaccharide (PPSV23) 2010, 11/15/2014   • Pneumococcal, Unspecified 2000   • Tdap 2011   • Zostavax 2009     The following portions of the patient's history were reviewed and updated as appropriate: She  has a past medical history of Aortic aneurysm (CMS/HCC), Arthritis, Asthma, Environmental allergies, Fractures, GERD (gastroesophageal reflux disease), Ground glass opacity present on imaging of lung (), H/O bone density study (2016), H/O pulmonary function tests (06/15/2017), Hepatic cyst, Hepatic steatosis, Hypertension, Shingles, and BLAIR (stress urinary incontinence, female).  She  has a past surgical history that includes  section (1958, 1960,  1963, 1966, 1971); Wrist surgery; and Transobturator sling (09/15/2009).  Her family history includes Lung cancer in her father and sister; Obesity in her mother and sister.  She  reports that she has never smoked. She has never used smokeless tobacco. She reports that she does not use drugs. No history on file for alcohol.  She is allergic to latex..    Outpatient Medications Prior to Visit   Medication Sig Dispense Refill   • albuterol (ACCUNEB) 0.63 MG/3ML nebulizer solution Take 3 mL by nebulization Every 6 (Six) Hours As Needed for Wheezing. 24 vial 1   • albuterol sulfate  (90 Base) MCG/ACT inhaler Inhale 2 puffs Every 4 (Four) Hours As Needed for Wheezing. 18 g 6   • Fluticasone Furoate-Vilanterol (BREO ELLIPTA) 200-25 MCG/INH inhaler Inhale 1 puff Daily. 3 each 3   • montelukast (SINGULAIR) 10 MG tablet Take 10 mg by mouth Every Night.     • oxybutynin XL (DITROPAN XL) 15 MG 24 hr tablet oxybutynin chloride ER 15 mg tablet,extended release 24 hr   Take 1 tablet by mouth once daily     • felodipine (PLENDIL) 10 MG 24 hr tablet Take 1 tablet by mouth Daily. 90 tablet 1   • lisinopril (PRINIVIL,ZESTRIL) 40 MG tablet TAKE 1 TABLET BY MOUTH  DAILY 90 tablet 1   • omeprazole (priLOSEC) 20 MG capsule TAKE 1 CAPSULE BY MOUTH  DAILY 90 capsule 1   • potassium chloride (K-DUR,KLOR-CON) 20 MEQ CR tablet TAKE 1 TABLET BY MOUTH TWO  TIMES DAILY 180 tablet 1   • triamterene-hydrochlorothiazide (DYAZIDE) 37.5-25 MG per capsule Take 1 capsule by mouth Every Morning. 90 capsule 1   • benzonatate (TESSALON) 200 MG capsule Take 1 capsule by mouth 3 (Three) Times a Day As Needed for Cough. 24 capsule 1   • oxybutynin XL (DITROPAN-XL) 10 MG 24 hr tablet Take 10 mg by mouth Daily.       No facility-administered medications prior to visit.        Patient Active Problem List   Diagnosis   • Environmental allergies   • Essential hypertension   • Hypokalemia   • Gastroesophageal reflux disease   • Ground glass opacity present  "on imaging of lung   • Asthma   • Morbidly obese (CMS/[facility])   • Prediabetes   • Cough   • Seasonal allergic rhinitis       Advanced Care Planning:  ACP discussion was held with the patient during this visit. Patient does not have an advance directive, declines further assistance.  Discussed with her children. Daughter to make decisions.     Review of Systems   HENT: Negative for hearing loss.    Cardiovascular: Positive for leg swelling.   Genitourinary: Positive for vaginal discharge.   Musculoskeletal: Positive for myalgias.   Neurological: Negative for light-headedness.   Psychiatric/Behavioral: Negative for dysphoric mood.       Compared to one year ago, the patient feels her physical health is the same.  Compared to one year ago, the patient feels her mental health is the same.    Reviewed chart for potential of high risk medication in the elderly: not applicable  Reviewed chart for potential of harmful drug interactions in the elderly:not applicable    Objective         Vitals:    09/28/20 0926   BP: 136/82   BP Location: Right arm   Patient Position: Sitting   Cuff Size: Large Adult   Pulse: 64   SpO2: 95%   Weight: 106 kg (233 lb 6.4 oz)   Height: 160 cm (63\")   PainSc: 0-No pain       Body mass index is 41.34 kg/m².  Discussed the patient's BMI with her. The BMI is above average; BMI management plan is completed.    Physical Exam  Vitals signs reviewed.   Constitutional:       General: She is not in acute distress.  HENT:      Right Ear: Hearing, tympanic membrane and ear canal normal.      Left Ear: Hearing, tympanic membrane and ear canal normal.   Eyes:      General:         Right eye: No discharge.         Left eye: No discharge.      Conjunctiva/sclera: Conjunctivae normal.   Neck:      Musculoskeletal: Neck supple.      Thyroid: No thyromegaly.   Cardiovascular:      Rate and Rhythm: Normal rate and regular rhythm.      Heart sounds: Normal heart sounds. No murmur.   Pulmonary:      Effort: Pulmonary " effort is normal. No respiratory distress.      Breath sounds: Normal breath sounds.   Abdominal:      Palpations: Abdomen is soft.      Tenderness: There is no abdominal tenderness.   Musculoskeletal:      Right lower leg: Edema ( 2+) present.      Left lower leg: Edema ( 2+) present.   Lymphadenopathy:      Cervical: No cervical adenopathy.   Skin:     General: Skin is warm and dry.      Findings: No rash.   Neurological:      Mental Status: She is alert and oriented to person, place, and time.      Gait: Gait normal.   Psychiatric:         Mood and Affect: Mood normal.         Behavior: Behavior normal.         Thought Content: Thought content normal.         Judgment: Judgment normal.               Assessment/Plan   Medicare Risks and Personalized Health Plan  CMS Preventative Services Quick Reference  Advance Directive Discussion  Diabetic Lab Screening   Immunizations Discussed/Encouraged (specific immunizations; Influenza )  Obesity/Overweight     Patient's Body mass index is 41.34 kg/m². BMI is above normal parameters. Recommendations include: exercise counseling and nutrition counseling.    The above risks/problems have been discussed with the patient.  Pertinent information has been shared with the patient in the After Visit Summary.  Follow up plans and orders are seen below in the Assessment/Plan Section.    Diagnoses and all orders for this visit:    1. Well adult exam (Primary)  Return when fasting to check labs.  Obtain recent hospital records and infectious disease notes.  2. Essential hypertension  -     Comprehensive Metabolic Panel; Future  -     Lipid Panel; Future  -     TSH Rfx On Abnormal To Free T4; Future  -     triamterene-hydrochlorothiazide (DYAZIDE) 37.5-25 MG per capsule; Take 1 capsule by mouth Every Night.  Dispense: 90 capsule; Refill: 1  -     lisinopril (PRINIVIL,ZESTRIL) 40 MG tablet; Take 1 tablet by mouth Every Night.  Dispense: 90 tablet; Refill: 1  -     felodipine (PLENDIL)  10 MG 24 hr tablet; Take 1 tablet by mouth Every Night.  Dispense: 90 tablet; Refill: 1  Noncompliant with medications.  Encouraged to take medications regularly.  Recheck in 6 months.  3. Prediabetes  -     Comprehensive Metabolic Panel; Future  -     Hemoglobin A1c; Future    4. Morbidly obese (CMS/HCC)    5. Flu vaccine need  Due to latex allergy no flu vaccine in the office today but she can check with pharmacy for latex free option.    6. Hypokalemia  -     Comprehensive Metabolic Panel; Future  -     potassium chloride (K-DUR,KLOR-CON) 20 MEQ CR tablet; Take 1 tablet by mouth 2 (Two) Times a Day.  Dispense: 180 tablet; Refill: 3    7. Screening for deficiency anemia  -     CBC & Differential; Future    8. Gastroesophageal reflux disease, esophagitis presence not specified  -     omeprazole (priLOSEC) 20 MG capsule; Take 1 capsule by mouth Daily.  Dispense: 90 capsule; Refill: 3    9. Acute vaginitis  -     fluconazole (Diflucan) 150 MG tablet; Take 1 tablet by mouth 1 (One) Time for 1 dose. Repeat in 3 days if needed  Dispense: 2 tablet; Refill: 0      Follow Up:  Return in about 6 months (around 3/28/2021) for Office visit HTN , Medicare Wellness in 1 year.     An After Visit Summary and PPPS were given to the patient.

## 2020-09-28 NOTE — PATIENT INSTRUCTIONS
Medicare Wellness  Personal Prevention Plan of Service     Date of Office Visit:  2020  Encounter Provider:  Brittany Ledezma MD  Place of Service:  [facility]  Patient Name: Mariela Long  :  1940    As part of the Medicare Wellness portion of your visit today, we are providing you with this personalized preventive plan of services (PPPS). This plan is based upon recommendations of the [place] Preventive Services Task Force (USPSTF) and the Advisory Committee on Immunization Practices (ACIP).    This lists the preventive care services that should be considered, and provides dates of when you are due. Items listed as completed are up-to-date and do not require any further intervention.    Health Maintenance   Topic Date Due   • ZOSTER VACCINE (2 of 3) 2010   • HEPATITIS C SCREENING  2019   • HEMOGLOBIN A1C  2020   • INFLUENZA VACCINE  2020   • MEDICARE ANNUAL WELLNESS  2021   • TDAP/TD VACCINES (2 - Td) 2021   • Pneumococcal Vaccine 65+  Completed       Orders Placed This Encounter   Procedures   • Fluad Quad 65+ yrs ([id number])   • Comprehensive Metabolic Panel     Standing Status:   Future     Standing Expiration Date:   2021   • Lipid Panel     Standing Status:   Future     Standing Expiration Date:   2021   • TSH Rfx On Abnormal To Free T4     Standing Status:   Future     Standing Expiration Date:   2021   • Hemoglobin A1c     Standing Status:   Future     Standing Expiration Date:   2021   • CBC & Differential     Standing Status:   Future     Standing Expiration Date:   2021     Order Specific Question:   Manual Differential     Answer:   No       Return in about 6 months (around 3/28/2021) for Office visit HTN , Medicare Wellness in 1 year.

## 2020-10-01 ENCOUNTER — LAB (OUTPATIENT)
Dept: LAB | Facility: HOSPITAL | Age: 80
End: 2020-10-01

## 2020-10-01 DIAGNOSIS — I10 ESSENTIAL HYPERTENSION: ICD-10-CM

## 2020-10-01 DIAGNOSIS — R73.03 PREDIABETES: ICD-10-CM

## 2020-10-01 DIAGNOSIS — Z13.0 SCREENING FOR DEFICIENCY ANEMIA: ICD-10-CM

## 2020-10-01 DIAGNOSIS — E87.6 HYPOKALEMIA: ICD-10-CM

## 2020-10-01 LAB
ALBUMIN SERPL-MCNC: 4.2 G/DL (ref 3.5–5.2)
ALBUMIN/GLOB SERPL: 1.2 G/DL
ALP SERPL-CCNC: 95 U/L (ref 39–117)
ALT SERPL W P-5'-P-CCNC: 19 U/L (ref 1–33)
ANION GAP SERPL CALCULATED.3IONS-SCNC: 10.1 MMOL/L (ref 5–15)
AST SERPL-CCNC: 23 U/L (ref 1–32)
BASOPHILS # BLD AUTO: 0.03 10*3/MM3 (ref 0–0.2)
BASOPHILS NFR BLD AUTO: 0.5 % (ref 0–1.5)
BILIRUB SERPL-MCNC: 0.4 MG/DL (ref 0–1.2)
BUN SERPL-MCNC: 15 MG/DL (ref 8–23)
BUN/CREAT SERPL: 16.3 (ref 7–25)
CALCIUM SPEC-SCNC: 9.4 MG/DL (ref 8.6–10.5)
CHLORIDE SERPL-SCNC: 105 MMOL/L (ref 98–107)
CHOLEST SERPL-MCNC: 159 MG/DL (ref 0–200)
CO2 SERPL-SCNC: 27.9 MMOL/L (ref 22–29)
CREAT SERPL-MCNC: 0.92 MG/DL (ref 0.57–1)
DEPRECATED RDW RBC AUTO: 43.6 FL (ref 37–54)
EOSINOPHIL # BLD AUTO: 0.26 10*3/MM3 (ref 0–0.4)
EOSINOPHIL NFR BLD AUTO: 4 % (ref 0.3–6.2)
ERYTHROCYTE [DISTWIDTH] IN BLOOD BY AUTOMATED COUNT: 13.2 % (ref 12.3–15.4)
GFR SERPL CREATININE-BSD FRML MDRD: 59 ML/MIN/1.73
GLOBULIN UR ELPH-MCNC: 3.4 GM/DL
GLUCOSE SERPL-MCNC: 91 MG/DL (ref 65–99)
HBA1C MFR BLD: 6.09 % (ref 4.8–5.6)
HCT VFR BLD AUTO: 35.5 % (ref 34–46.6)
HDLC SERPL-MCNC: 58 MG/DL (ref 40–60)
HGB BLD-MCNC: 11.8 G/DL (ref 12–15.9)
IMM GRANULOCYTES # BLD AUTO: 0.01 10*3/MM3 (ref 0–0.05)
IMM GRANULOCYTES NFR BLD AUTO: 0.2 % (ref 0–0.5)
LDLC SERPL CALC-MCNC: 91 MG/DL (ref 0–100)
LDLC/HDLC SERPL: 1.57 {RATIO}
LYMPHOCYTES # BLD AUTO: 1.96 10*3/MM3 (ref 0.7–3.1)
LYMPHOCYTES NFR BLD AUTO: 29.9 % (ref 19.6–45.3)
MCH RBC QN AUTO: 29.7 PG (ref 26.6–33)
MCHC RBC AUTO-ENTMCNC: 33.2 G/DL (ref 31.5–35.7)
MCV RBC AUTO: 89.4 FL (ref 79–97)
MONOCYTES # BLD AUTO: 0.42 10*3/MM3 (ref 0.1–0.9)
MONOCYTES NFR BLD AUTO: 6.4 % (ref 5–12)
NEUTROPHILS NFR BLD AUTO: 3.88 10*3/MM3 (ref 1.7–7)
NEUTROPHILS NFR BLD AUTO: 59 % (ref 42.7–76)
NRBC BLD AUTO-RTO: 0 /100 WBC (ref 0–0.2)
PLATELET # BLD AUTO: 206 10*3/MM3 (ref 140–450)
PMV BLD AUTO: 12.1 FL (ref 6–12)
POTASSIUM SERPL-SCNC: 4.3 MMOL/L (ref 3.5–5.2)
PROT SERPL-MCNC: 7.6 G/DL (ref 6–8.5)
RBC # BLD AUTO: 3.97 10*6/MM3 (ref 3.77–5.28)
SODIUM SERPL-SCNC: 143 MMOL/L (ref 136–145)
TRIGL SERPL-MCNC: 49 MG/DL (ref 0–150)
TSH SERPL DL<=0.05 MIU/L-ACNC: 2.09 UIU/ML (ref 0.27–4.2)
VLDLC SERPL-MCNC: 9.8 MG/DL (ref 5–40)
WBC # BLD AUTO: 6.56 10*3/MM3 (ref 3.4–10.8)

## 2020-10-01 PROCEDURE — 85025 COMPLETE CBC W/AUTO DIFF WBC: CPT

## 2020-10-01 PROCEDURE — 83036 HEMOGLOBIN GLYCOSYLATED A1C: CPT

## 2020-10-01 PROCEDURE — 80061 LIPID PANEL: CPT

## 2020-10-01 PROCEDURE — 80053 COMPREHEN METABOLIC PANEL: CPT

## 2020-10-01 PROCEDURE — 84443 ASSAY THYROID STIM HORMONE: CPT

## 2020-10-02 ENCOUNTER — TELEPHONE (OUTPATIENT)
Dept: FAMILY MEDICINE CLINIC | Facility: CLINIC | Age: 80
End: 2020-10-02

## 2020-10-02 DIAGNOSIS — D64.9 ANEMIA, UNSPECIFIED TYPE: Primary | ICD-10-CM

## 2020-10-02 DIAGNOSIS — R94.4 DECREASED GFR: ICD-10-CM

## 2020-10-02 NOTE — TELEPHONE ENCOUNTER
The test results show that your fasting sugar is normal but A1c is still elevated with prediabetes.  Kidney function is moderately reduced and has declined from previous.  I would like you to return next week to have repeat labs.  Please ensure to drink plenty of water prior to having your blood work drawn.  Normal electrolytes.  Normal liver function.  Cholesterol levels are improving.  Normal thyroid function.  Normal white blood cell count.  Mild anemia.  I may recheck your anemia next week as well and check an iron level.

## 2020-10-02 NOTE — TELEPHONE ENCOUNTER
Informed pt about lab report instructed our lab will be closed next week but to go to the diagnostic center to have labs drawn

## 2020-10-06 ENCOUNTER — LAB (OUTPATIENT)
Dept: LAB | Facility: HOSPITAL | Age: 80
End: 2020-10-06

## 2020-10-06 DIAGNOSIS — D64.9 ANEMIA, UNSPECIFIED TYPE: ICD-10-CM

## 2020-10-06 DIAGNOSIS — R94.4 DECREASED GFR: ICD-10-CM

## 2020-10-06 LAB
ANION GAP SERPL CALCULATED.3IONS-SCNC: 6.4 MMOL/L (ref 5–15)
BASOPHILS # BLD AUTO: 0.04 10*3/MM3 (ref 0–0.2)
BASOPHILS NFR BLD AUTO: 0.7 % (ref 0–1.5)
BUN SERPL-MCNC: 16 MG/DL (ref 8–23)
BUN/CREAT SERPL: 19.5 (ref 7–25)
CALCIUM SPEC-SCNC: 9.3 MG/DL (ref 8.6–10.5)
CHLORIDE SERPL-SCNC: 106 MMOL/L (ref 98–107)
CO2 SERPL-SCNC: 27.6 MMOL/L (ref 22–29)
CREAT SERPL-MCNC: 0.82 MG/DL (ref 0.57–1)
DEPRECATED RDW RBC AUTO: 45.1 FL (ref 37–54)
EOSINOPHIL # BLD AUTO: 0.27 10*3/MM3 (ref 0–0.4)
EOSINOPHIL NFR BLD AUTO: 4.9 % (ref 0.3–6.2)
ERYTHROCYTE [DISTWIDTH] IN BLOOD BY AUTOMATED COUNT: 13.3 % (ref 12.3–15.4)
GFR SERPL CREATININE-BSD FRML MDRD: 68 ML/MIN/1.73
GLUCOSE SERPL-MCNC: 91 MG/DL (ref 65–99)
HCT VFR BLD AUTO: 36 % (ref 34–46.6)
HGB BLD-MCNC: 11.7 G/DL (ref 12–15.9)
IMM GRANULOCYTES # BLD AUTO: 0.01 10*3/MM3 (ref 0–0.05)
IMM GRANULOCYTES NFR BLD AUTO: 0.2 % (ref 0–0.5)
IRON 24H UR-MRATE: 48 MCG/DL (ref 37–145)
IRON SATN MFR SERPL: 12 % (ref 20–50)
LYMPHOCYTES # BLD AUTO: 1.36 10*3/MM3 (ref 0.7–3.1)
LYMPHOCYTES NFR BLD AUTO: 24.7 % (ref 19.6–45.3)
MCH RBC QN AUTO: 29.6 PG (ref 26.6–33)
MCHC RBC AUTO-ENTMCNC: 32.5 G/DL (ref 31.5–35.7)
MCV RBC AUTO: 91.1 FL (ref 79–97)
MONOCYTES # BLD AUTO: 0.47 10*3/MM3 (ref 0.1–0.9)
MONOCYTES NFR BLD AUTO: 8.5 % (ref 5–12)
NEUTROPHILS NFR BLD AUTO: 3.35 10*3/MM3 (ref 1.7–7)
NEUTROPHILS NFR BLD AUTO: 61 % (ref 42.7–76)
NRBC BLD AUTO-RTO: 0 /100 WBC (ref 0–0.2)
PLATELET # BLD AUTO: 210 10*3/MM3 (ref 140–450)
PMV BLD AUTO: 12.5 FL (ref 6–12)
POTASSIUM SERPL-SCNC: 4.4 MMOL/L (ref 3.5–5.2)
RBC # BLD AUTO: 3.95 10*6/MM3 (ref 3.77–5.28)
SODIUM SERPL-SCNC: 140 MMOL/L (ref 136–145)
TIBC SERPL-MCNC: 390 MCG/DL (ref 298–536)
TRANSFERRIN SERPL-MCNC: 262 MG/DL (ref 200–360)
WBC # BLD AUTO: 5.5 10*3/MM3 (ref 3.4–10.8)

## 2020-10-06 PROCEDURE — 83540 ASSAY OF IRON: CPT

## 2020-10-06 PROCEDURE — 85025 COMPLETE CBC W/AUTO DIFF WBC: CPT

## 2020-10-06 PROCEDURE — 36415 COLL VENOUS BLD VENIPUNCTURE: CPT

## 2020-10-06 PROCEDURE — 84466 ASSAY OF TRANSFERRIN: CPT

## 2020-10-06 PROCEDURE — 80048 BASIC METABOLIC PNL TOTAL CA: CPT

## 2020-10-07 ENCOUNTER — TELEPHONE (OUTPATIENT)
Dept: FAMILY MEDICINE CLINIC | Facility: CLINIC | Age: 80
End: 2020-10-07

## 2020-10-07 DIAGNOSIS — D64.9 ANEMIA, UNSPECIFIED TYPE: Primary | ICD-10-CM

## 2020-10-07 PROCEDURE — 82274 ASSAY TEST FOR BLOOD FECAL: CPT | Performed by: FAMILY MEDICINE

## 2020-10-07 NOTE — TELEPHONE ENCOUNTER
The test results show that your kidney function is improving only mildly decreased.  Electrolytes are normal.  Blood count continues to show a mild anemia.  Your iron levels are okay.  I would like you to do at home stool testing to see if you might be losing blood in your stool.  When you have collected the sample please bring it back to our office and the nurse will run the test.

## 2020-10-07 NOTE — TELEPHONE ENCOUNTER
Attempted to contact, no answer LVM to call us back regarding her lab results. I have created a letter and will mail out to her home address today.     Hub can relay

## 2020-10-13 LAB
EXPIRATION DATE 2: NORMAL
EXPIRATION DATE 3: NORMAL
EXPIRATION DATE: NORMAL
GASTROCULT GAST QL: NEGATIVE
HEMOCCULT SP2 STL QL: NEGATIVE
HEMOCCULT SP3 STL QL: NEGATIVE
Lab: NORMAL

## 2021-01-11 ENCOUNTER — OFFICE VISIT (OUTPATIENT)
Dept: FAMILY MEDICINE CLINIC | Facility: CLINIC | Age: 81
End: 2021-01-11

## 2021-01-11 VITALS
DIASTOLIC BLOOD PRESSURE: 82 MMHG | BODY MASS INDEX: 41.81 KG/M2 | HEIGHT: 62 IN | SYSTOLIC BLOOD PRESSURE: 136 MMHG | WEIGHT: 227.2 LBS | HEART RATE: 84 BPM

## 2021-01-11 DIAGNOSIS — M25.562 ACUTE PAIN OF LEFT KNEE: ICD-10-CM

## 2021-01-11 DIAGNOSIS — M25.552 ACUTE PAIN OF BOTH HIPS: ICD-10-CM

## 2021-01-11 DIAGNOSIS — N39.0 ACUTE UTI: Primary | ICD-10-CM

## 2021-01-11 DIAGNOSIS — M25.551 ACUTE PAIN OF BOTH HIPS: ICD-10-CM

## 2021-01-11 DIAGNOSIS — R35.0 URINARY FREQUENCY: ICD-10-CM

## 2021-01-11 PROBLEM — N39.41 URGE INCONTINENCE OF URINE: Status: ACTIVE | Noted: 2020-08-18

## 2021-01-11 PROBLEM — R73.03 PREDIABETES: Chronic | Status: ACTIVE | Noted: 2019-09-25

## 2021-01-11 PROBLEM — K21.9 GASTROESOPHAGEAL REFLUX DISEASE: Chronic | Status: ACTIVE | Noted: 2019-02-18

## 2021-01-11 PROBLEM — I10 ESSENTIAL HYPERTENSION: Chronic | Status: ACTIVE | Noted: 2019-02-18

## 2021-01-11 PROBLEM — E66.01 MORBIDLY OBESE: Chronic | Status: ACTIVE | Noted: 2019-09-24

## 2021-01-11 LAB
BILIRUB BLD-MCNC: ABNORMAL MG/DL
CLARITY, POC: ABNORMAL
COLOR UR: YELLOW
GLUCOSE UR STRIP-MCNC: NEGATIVE MG/DL
KETONES UR QL: ABNORMAL
LEUKOCYTE EST, POC: ABNORMAL
NITRITE UR-MCNC: POSITIVE MG/ML
PH UR: 5.5 [PH] (ref 5–8)
PROT UR STRIP-MCNC: ABNORMAL MG/DL
RBC # UR STRIP: ABNORMAL /UL
SP GR UR: 1.02 (ref 1–1.03)
UROBILINOGEN UR QL: ABNORMAL

## 2021-01-11 PROCEDURE — 99214 OFFICE O/P EST MOD 30 MIN: CPT | Performed by: FAMILY MEDICINE

## 2021-01-11 PROCEDURE — 87086 URINE CULTURE/COLONY COUNT: CPT | Performed by: FAMILY MEDICINE

## 2021-01-11 PROCEDURE — 87186 SC STD MICRODIL/AGAR DIL: CPT | Performed by: FAMILY MEDICINE

## 2021-01-11 PROCEDURE — 87088 URINE BACTERIA CULTURE: CPT | Performed by: FAMILY MEDICINE

## 2021-01-11 PROCEDURE — 81003 URINALYSIS AUTO W/O SCOPE: CPT | Performed by: FAMILY MEDICINE

## 2021-01-11 RX ORDER — NITROFURANTOIN 25; 75 MG/1; MG/1
100 CAPSULE ORAL EVERY 12 HOURS SCHEDULED
Qty: 10 CAPSULE | Refills: 0 | Status: SHIPPED | OUTPATIENT
Start: 2021-01-11 | End: 2021-01-16

## 2021-01-11 RX ORDER — METHYLPREDNISOLONE 4 MG/1
TABLET ORAL
Qty: 1 EACH | Refills: 0 | Status: SHIPPED | OUTPATIENT
Start: 2021-01-11 | End: 2021-03-29

## 2021-01-11 NOTE — PROGRESS NOTES
"Chief Complaint  Hip Pain (patient reports for the past 3 days that she has been having pain in bilateral hip), Knee Pain (left knee started hurting her today), and Difficulty Urinating (urinary frequency, states that urine has an odor as well)    Subjective          Mariela Long presents to [facility] for   Hip Pain   The incident occurred 3 to 5 days ago. There was no injury mechanism. The pain is present in the right hip and left hip.   Knee Pain   The incident occurred 6 to 12 hours ago. There was no injury mechanism. The pain is present in the left knee.   Urinary Tract Infection   This is a new problem. The current episode started more than 1 month ago. The patient is experiencing no pain. There has been no fever. Associated symptoms include frequency.     Urine odor for awhile thought medication or food. Urinates every few hours several times per day. Nocturia x 3. Denies abdominal or back pain. Bilateral posterior hip and buttock pain. She took some aleve yesterday, little pain relief. Aleve makes her tired. No spasms. Trying to walk she feels a cramp.       Objective   Vital Signs:   /82 (BP Location: Right arm, Patient Position: Sitting, Cuff Size: Large Adult)   Pulse 84   Ht 157.5 cm (62\")   Wt 103 kg (227 lb 3.2 oz)   BMI 41.56 kg/m²     Physical Exam  Vitals signs reviewed.   Constitutional:       General: She is not in acute distress.     Appearance: She is obese. She is not ill-appearing.   Cardiovascular:      Rate and Rhythm: Normal rate and regular rhythm.      Heart sounds: No murmur.   Pulmonary:      Effort: Pulmonary effort is normal.      Breath sounds: Normal breath sounds.   Musculoskeletal:      Right knee: She exhibits normal range of motion.      Left knee: She exhibits normal range of motion.      Lumbar back: She exhibits no bony tenderness and no spasm.        Back:    Neurological:      Mental Status: She is alert.      Comments: Seated SLR " negative bilateral        Result Review :   The following data was reviewed by: Brittany Ledezma MD on 01/11/2021:  CMP    CMP 10/1/20 10/6/20   BUN 15 16   Creatinine 0.92 0.82   eGFR Non African Am 59 (A) 68   Sodium 143 140   Potassium 4.3 4.4   Chloride 105 106   Calcium 9.4 9.3   Albumin 4.20    Total Bilirubin 0.4    Alkaline Phosphatase 95    AST (SGOT) 23    ALT (SGPT) 19    (A) Abnormal value            BMP    BMP 10/1/20 10/6/20   BUN 15 16   Creatinine 0.92 0.82   Sodium 143 140   Potassium 4.3 4.4   Chloride 105 106   CO2 27.9 27.6   Calcium 9.4 9.3           UA    Urinalysis 1/11/21   Ketones, UA Trace (A)   Leukocytes, UA Large (3+) (A)   (A) Abnormal value            Results for orders placed or performed in visit on 01/11/21   POCT urinalysis dipstick, automated    Specimen: Urine   Result Value Ref Range    Color Yellow Yellow, Straw, Dark Yellow, Rhonda    Clarity, UA Cloudy (A) Clear    Specific Gravity  1.025 1.005 - 1.030    pH, Urine 5.5 5.0 - 8.0    Leukocytes Large (3+) (A) Negative    Nitrite, UA Positive (A) Negative    Protein, POC 1+ (A) Negative mg/dL    Glucose, UA Negative Negative, 1000 mg/dL (3+) mg/dL    Ketones, UA Trace (A) Negative    Urobilinogen, UA      Bilirubin Small (1+) (A) Negative    Blood, UA 2+ (A) Negative                 Assessment and Plan    Problem List Items Addressed This Visit     None      Visit Diagnoses     Acute UTI    -  Primary    Relevant Medications    nitrofurantoin, macrocrystal-monohydrate, (MACROBID) 100 MG capsule    Other Relevant Orders    Urine Culture - Urine, Urine, Clean Catch    Urinary frequency        Relevant Orders    POCT urinalysis dipstick, automated (Completed)    Acute pain of both hips        Relevant Medications    methylPREDNISolone (MEDROL) 4 MG dose pack    Acute pain of left knee        Relevant Medications    methylPREDNISolone (MEDROL) 4 MG dose pack        New UTI plan to treat with Macrobid.  Send urine for  culture to check antibiotic sensitivities.  Acute musculoskeletal pain.  Patient has sedation with over-the-counter NSAIDs.  She has taken steroids in the past for other injuries without side effects therefore will start Medrol Dosepak for pain inflammation.      Follow Up   Return if symptoms worsen or fail to improve.  Patient was given instructions and counseling regarding her condition or for health maintenance advice. Please see specific information pulled into the AVS if appropriate.     Electronically signed by Brittany Ledezma MD, 01/11/21, 3:14 PM EST.

## 2021-01-13 ENCOUNTER — TELEPHONE (OUTPATIENT)
Dept: FAMILY MEDICINE CLINIC | Facility: CLINIC | Age: 81
End: 2021-01-13

## 2021-01-13 LAB — BACTERIA SPEC AEROBE CULT: ABNORMAL

## 2021-01-13 NOTE — TELEPHONE ENCOUNTER
READ PATIENT THE NOTE DR ALVARADO LEFT AND TO LET HER KNOW SHE HAS TWO RX'S TO . SHE PICKED UP THOSE BUT WAS UNDER THE IMPRESSION WE HAD SENT IN ANOTHER ONE. INFORMED HER IT WAS A DUPLICATE MESSAGE.

## 2021-01-13 NOTE — TELEPHONE ENCOUNTER
Attempted to contact pt, no answer. Left voicemail advising pt to call the office back about lab results.

## 2021-01-13 NOTE — TELEPHONE ENCOUNTER
----- Message from Brittany Ledezma MD sent at 1/13/2021  6:13 AM EST -----  Urine infection from E. Coli. Follow-up in clinic if your symptoms are not improving with the antibiotics.

## 2021-01-13 NOTE — TELEPHONE ENCOUNTER
Attempted to contact. No answer LVM asking for a return call to discuss urine culture results and newly prescribed antibiotics.     Unable to LVM phone continued to ring

## 2021-01-13 NOTE — TELEPHONE ENCOUNTER
Jackie Lopez MA   1/13/2021  8:40 AM EST      Attempted to contact, no answer LVM advising patient to call back regarding lab results and to  antibiotics

## 2021-01-15 ENCOUNTER — TELEPHONE (OUTPATIENT)
Dept: FAMILY MEDICINE CLINIC | Facility: CLINIC | Age: 81
End: 2021-01-15

## 2021-01-15 NOTE — TELEPHONE ENCOUNTER
Called patient and informed her that it didn't look like she had another antibiotic that was sent in, it looked like Dr ALVARADO wanted her to follow up in clinic if symptoms were not better after finishing antibiotics from Monday. Patient verbalized understanding and had no further questions or concerns.

## 2021-01-15 NOTE — TELEPHONE ENCOUNTER
Caller: Mariela Long    Relationship to patient: Self    Best call back number: [phone]  Patient is needing a call back regarding antibiotic that was to be called in    She picked up 2 prescriptions but not the antibiotic    Pharmacy    [address] [phone] [facility] [phone] [facility]

## 2021-03-29 ENCOUNTER — OFFICE VISIT (OUTPATIENT)
Dept: FAMILY MEDICINE CLINIC | Facility: CLINIC | Age: 81
End: 2021-03-29

## 2021-03-29 VITALS
WEIGHT: 236.8 LBS | BODY MASS INDEX: 43.58 KG/M2 | HEIGHT: 62 IN | HEART RATE: 73 BPM | DIASTOLIC BLOOD PRESSURE: 92 MMHG | OXYGEN SATURATION: 97 % | SYSTOLIC BLOOD PRESSURE: 162 MMHG

## 2021-03-29 DIAGNOSIS — E66.01 MORBIDLY OBESE (HCC): Chronic | ICD-10-CM

## 2021-03-29 DIAGNOSIS — I71.9 AORTIC ANEURYSM WITHOUT RUPTURE, UNSPECIFIED PORTION OF AORTA (HCC): ICD-10-CM

## 2021-03-29 DIAGNOSIS — I10 ESSENTIAL HYPERTENSION: Primary | Chronic | ICD-10-CM

## 2021-03-29 PROCEDURE — 99214 OFFICE O/P EST MOD 30 MIN: CPT | Performed by: FAMILY MEDICINE

## 2021-03-29 RX ORDER — LORATADINE 10 MG/1
10 TABLET ORAL DAILY
COMMUNITY

## 2021-03-29 RX ORDER — AMLODIPINE BESYLATE 10 MG/1
10 TABLET ORAL DAILY
Qty: 90 TABLET | Refills: 1 | Status: SHIPPED | OUTPATIENT
Start: 2021-03-29 | End: 2022-03-30 | Stop reason: DRUGHIGH

## 2021-03-29 RX ORDER — FELODIPINE 10 MG/1
10 TABLET, EXTENDED RELEASE ORAL NIGHTLY
Qty: 90 TABLET | Refills: 1 | Status: SHIPPED | OUTPATIENT
Start: 2021-03-29 | End: 2021-03-29

## 2021-03-29 RX ORDER — TRIAMTERENE AND HYDROCHLOROTHIAZIDE 37.5; 25 MG/1; MG/1
1 CAPSULE ORAL NIGHTLY
Qty: 90 CAPSULE | Refills: 1 | Status: SHIPPED | OUTPATIENT
Start: 2021-03-29 | End: 2022-03-30 | Stop reason: SDUPTHER

## 2021-03-29 RX ORDER — LISINOPRIL 40 MG/1
40 TABLET ORAL NIGHTLY
Qty: 90 TABLET | Refills: 1 | Status: SHIPPED | OUTPATIENT
Start: 2021-03-29 | End: 2021-09-30 | Stop reason: SDUPTHER

## 2021-03-29 NOTE — PATIENT INSTRUCTIONS
"https://www.nhlbi.nih.gov/files/docs/public/heart/dash_brief.pdf\">   DASH Eating Plan  DASH stands for Dietary Approaches to Stop Hypertension. The DASH eating plan is a healthy eating plan that has been shown to:  · Reduce high blood pressure (hypertension).  · Reduce your risk for type 2 diabetes, heart disease, and stroke.  · Help with weight loss.  What are tips for following this plan?  Reading food labels  · Check food labels for the amount of salt (sodium) per serving. Choose foods with less than 5 percent of the Daily Value of sodium. Generally, foods with less than 300 milligrams (mg) of sodium per serving fit into this eating plan.  · To find whole grains, look for the word \"whole\" as the first word in the ingredient list.  Shopping  · Buy products labeled as \"low-sodium\" or \"no salt added.\"  · Buy fresh foods. Avoid canned foods and pre-made or frozen meals.  Cooking  · Avoid adding salt when cooking. Use salt-free seasonings or herbs instead of table salt or sea salt. Check with your health care provider or pharmacist before using salt substitutes.  · Do not perry foods. Cook foods using healthy methods such as baking, boiling, grilling, roasting, and broiling instead.  · Cook with heart-healthy oils, such as olive, canola, avocado, soybean, or sunflower oil.  Meal planning    · Eat a balanced diet that includes:  ? 4 or more servings of fruits and 4 or more servings of vegetables each day. Try to fill one-half of your plate with fruits and vegetables.  ? 6-8 servings of whole grains each day.  ? Less than 6 oz (170 g) of lean meat, poultry, or fish each day. A 3-oz (85-g) serving of meat is about the same size as a deck of cards. One egg equals 1 oz (28 g).  ? 2-3 servings of low-fat dairy each day. One serving is 1 cup (237 mL).  ? 1 serving of nuts, seeds, or beans 5 times each week.  ? 2-3 servings of heart-healthy fats. Healthy fats called omega-3 fatty acids are found in foods such as walnuts, " flaxseeds, fortified milks, and eggs. These fats are also found in cold-water fish, such as sardines, salmon, and mackerel.  · Limit how much you eat of:  ? Canned or prepackaged foods.  ? Food that is high in trans fat, such as some fried foods.  ? Food that is high in saturated fat, such as fatty meat.  ? Desserts and other sweets, sugary drinks, and other foods with added sugar.  ? Full-fat dairy products.  · Do not salt foods before eating.  · Do not eat more than 4 egg yolks a week.  · Try to eat at least 2 vegetarian meals a week.  · Eat more home-cooked food and less restaurant, buffet, and fast food.  Lifestyle  · When eating at a restaurant, ask that your food be prepared with less salt or no salt, if possible.  · If you drink alcohol:  ? Limit how much you use to:  § 0-1 drink a day for women who are not pregnant.  § 0-2 drinks a day for men.  ? Be aware of how much alcohol is in your drink. In the [place] one drink equals one 12 oz bottle of beer (355 mL), one 5 oz glass of wine (148 mL), or one 1½ oz glass of hard liquor (44 mL).  General information  · Avoid eating more than 2,300 mg of salt a day. If you have hypertension, you may need to reduce your sodium intake to 1,500 mg a day.  · Work with your health care provider to maintain a healthy body weight or to lose weight. Ask what an ideal weight is for you.  · Get at least 30 minutes of exercise that causes your heart to beat faster (aerobic exercise) most days of the week. Activities may include walking, swimming, or biking.  · Work with your health care provider or dietitian to adjust your eating plan to your individual calorie needs.  What foods should I eat?  Fruits  All fresh, dried, or frozen fruit. Canned fruit in natural juice (without added sugar).  Vegetables  Fresh or frozen vegetables (raw, steamed, roasted, or grilled). Low-sodium or reduced-sodium tomato and vegetable juice. Low-sodium or reduced-sodium tomato sauce and tomato paste.  Low-sodium or reduced-sodium canned vegetables.  Grains  Whole-grain or whole-wheat bread. Whole-grain or whole-wheat pasta. Brown rice. Oatmeal. Quinoa. Bulgur. Whole-grain and low-sodium cereals. Arlet bread. Low-fat, low-sodium crackers. Whole-wheat flour tortillas.  Meats and other proteins  Skinless chicken or turkey. Ground chicken or turkey. Pork with fat trimmed off. Fish and seafood. Egg whites. Dried beans, peas, or lentils. Unsalted nuts, nut butters, and seeds. Unsalted canned beans. Lean cuts of beef with fat trimmed off. Low-sodium, lean precooked or cured meat, such as sausages or meat loaves.  Dairy  Low-fat (1%) or fat-free (skim) milk. Reduced-fat, low-fat, or fat-free cheeses. Nonfat, low-sodium ricotta or cottage cheese. Low-fat or nonfat yogurt. Low-fat, low-sodium cheese.  Fats and oils  Soft margarine without trans fats. Vegetable oil. Reduced-fat, low-fat, or light mayonnaise and salad dressings (reduced-sodium). Canola, safflower, olive, avocado, soybean, and sunflower oils. Avocado.  Seasonings and condiments  Herbs. Spices. Seasoning mixes without salt.  Other foods  Unsalted popcorn and pretzels. Fat-free sweets.  The items listed above may not be a complete list of foods and beverages you can eat. Contact a dietitian for more information.  What foods should I avoid?  Fruits  Canned fruit in a light or heavy syrup. Fried fruit. Fruit in cream or butter sauce.  Vegetables  Creamed or fried vegetables. Vegetables in a cheese sauce. Regular canned vegetables (not low-sodium or reduced-sodium). Regular canned tomato sauce and paste (not low-sodium or reduced-sodium). Regular tomato and vegetable juice (not low-sodium or reduced-sodium). Pickles. Olives.  Grains  Baked goods made with fat, such as croissants, muffins, or some breads. Dry pasta or rice meal packs.  Meats and other proteins  Fatty cuts of meat. Ribs. Fried meat. Amaral. Bologna, salami, and other precooked or cured meats, such as  sausages or meat loaves. Fat from the back of a pig (fatback). Bratwurst. Salted nuts and seeds. Canned beans with added salt. Canned or smoked fish. Whole eggs or egg yolks. Chicken or turkey with skin.  Dairy  Whole or 2% milk, cream, and half-and-half. Whole or full-fat cream cheese. Whole-fat or sweetened yogurt. Full-fat cheese. Nondairy creamers. Whipped toppings. Processed cheese and cheese spreads.  Fats and oils  Butter. Stick margarine. Lard. Shortening. Ghee. Amaral fat. Tropical oils, such as coconut, palm kernel, or palm oil.  Seasonings and condiments  Onion salt, garlic salt, seasoned salt, table salt, and sea salt. Worcestershire sauce. Tartar sauce. Barbecue sauce. Teriyaki sauce. Soy sauce, including reduced-sodium. Steak sauce. Canned and packaged gravies. Fish sauce. Oyster sauce. Cocktail sauce. Store-bought horseradish. Ketchup. Mustard. Meat flavorings and tenderizers. Bouillon cubes. Hot sauces. Pre-made or packaged marinades. Pre-made or packaged taco seasonings. Relishes. Regular salad dressings.  Other foods  Salted popcorn and pretzels.  The items listed above may not be a complete list of foods and beverages you should avoid. Contact a dietitian for more information.  Where to find more information  · National Heart, Lung, and Blood [place]: www.nhlbi.nih.gov  · American Heart Association: www.heart.org  · Academy of Nutrition and Dietetics: www.eatright.org  · National Kidney Foundation: www.kidney.org  Summary  · The DASH eating plan is a healthy eating plan that has been shown to reduce high blood pressure (hypertension). It may also reduce your risk for type 2 diabetes, heart disease, and stroke.  · When on the DASH eating plan, aim to eat more fresh fruits and vegetables, whole grains, lean proteins, low-fat dairy, and heart-healthy fats.  · With the DASH eating plan, you should limit salt (sodium) intake to 2,300 mg a day. If you have hypertension, you may need to reduce your  sodium intake to 1,500 mg a day.  · Work with your health care provider or dietitian to adjust your eating plan to your individual calorie needs.  This information is not intended to replace advice given to you by your health care provider. Make sure you discuss any questions you have with your health care provider.  Document Revised: 11/20/2020 Document Reviewed: 11/20/2020  [employer] Patient Education © 2021 [employer]

## 2021-03-29 NOTE — PROGRESS NOTES
"Chief Complaint  Hypertension    Subjective          Mariela Long presents to [facility]  Hypertension  This is a chronic problem. The problem is uncontrolled.      Home BP readings up to 200/110. Sometimes she feels pressure in back of neck and head. She is tried all the tire. She takes her BP meds in the evening before work. She always takes lisinopril, but not the felodipine or dyazide. She can't go to the bathroom at work.     Allergies are killing her with nasal congestion, sore throat. Worse with grass and trees. Seldom gets sinus infection. She takes claritin daily     She is wondering about follow-up for aortic aneurysm.  She does not remember when she was told about the aneurysm or what follow-up testing has been done to date.    Objective   Vital Signs:   /92 (BP Location: Left arm, Patient Position: Sitting, Cuff Size: Large Adult)   Pulse 73   Ht 157.5 cm (62\")   Wt 107 kg (236 lb 12.8 oz)   SpO2 97%   BMI 43.31 kg/m²     Physical Exam  Vitals reviewed.   Constitutional:       General: She is not in acute distress.     Appearance: She is obese. She is not ill-appearing.   Cardiovascular:      Rate and Rhythm: Normal rate and regular rhythm.   Pulmonary:      Effort: Pulmonary effort is normal.      Breath sounds: Normal breath sounds.   Neurological:      Mental Status: She is alert.   Psychiatric:         Mood and Affect: Mood normal.         Behavior: Behavior normal.         Thought Content: Thought content normal.         Judgment: Judgment normal.        Result Review :                CT Chest With Contrast (09/30/2019 11:27)    Assessment and Plan    Diagnoses and all orders for this visit:    1. Essential hypertension (Primary)  -     triamterene-hydrochlorothiazide (DYAZIDE) 37.5-25 MG per capsule; Take 1 capsule by mouth Every Night.  Dispense: 90 capsule; Refill: 1  -     lisinopril (PRINIVIL,ZESTRIL) 40 MG tablet; Take 1 tablet by mouth Every Night.  " Dispense: 90 tablet; Refill: 1  -     Discontinue: felodipine (PLENDIL) 10 MG 24 hr tablet; Take 1 tablet by mouth Every Night.  Dispense: 90 tablet; Refill: 1  -     amLODIPine (NORVASC) 10 MG tablet; Take 1 tablet by mouth Daily.  Dispense: 90 tablet; Refill: 1  Medication noncompliance.  Discussed with patient harm of untreated high blood pressure and need to take medications as prescribed.  She also mention she was concerned about the cost of the felodipine.  At this time will change to amlodipine.  Continue lisinopril.  Restart diuretic.  Reassess next month.  2. Morbidly obese (CMS/HCC)  Patient's (Body mass index is 43.31 kg/m².) indicates that they are morbidly obese (BMI > 40 or > 35 with obesity - related health condition) with obesity-related health conditions that include hypertension, GERD, urinary stress insontinence and prediabetes . Obesity is worsening. BMI is is above average; BMI management plan is completed. We discussed low calorie, low carb based diet program, increasing exercise and Bring snacks to work.     3. Aortic aneurysm without rupture, unspecified portion of aorta (CMS/HCC)  -     US Aorta Limited; Future  Reviewed CT of the chest that did not mention thoracic aortic aneurysm.  We will further evaluate with ultrasound of the aorta.      Follow Up   Return in about 1 month (around 4/29/2021) for Office visit HTN .  Patient was given instructions and counseling regarding her condition or for health maintenance advice. Please see specific information pulled into the AVS if appropriate.     Electronically signed by Brittany Ledezma MD, 03/29/21, 1:24 PM EDT.

## 2021-04-16 ENCOUNTER — HOSPITAL ENCOUNTER (OUTPATIENT)
Dept: ULTRASOUND IMAGING | Facility: HOSPITAL | Age: 81
Discharge: HOME | End: 2021-04-16
Admitting: FAMILY MEDICINE

## 2021-04-16 DIAGNOSIS — I71.9 AORTIC ANEURYSM WITHOUT RUPTURE, UNSPECIFIED PORTION OF AORTA (HCC): ICD-10-CM

## 2021-04-16 PROCEDURE — 76775 US EXAM ABDO BACK WALL LIM: CPT

## 2021-04-29 ENCOUNTER — OFFICE VISIT (OUTPATIENT)
Dept: FAMILY MEDICINE CLINIC | Facility: CLINIC | Age: 81
End: 2021-04-29

## 2021-04-29 VITALS
BODY MASS INDEX: 43.69 KG/M2 | OXYGEN SATURATION: 98 % | HEIGHT: 62 IN | HEART RATE: 63 BPM | DIASTOLIC BLOOD PRESSURE: 82 MMHG | SYSTOLIC BLOOD PRESSURE: 136 MMHG | WEIGHT: 237.4 LBS

## 2021-04-29 DIAGNOSIS — R60.9 PERIPHERAL EDEMA: ICD-10-CM

## 2021-04-29 DIAGNOSIS — I10 ESSENTIAL HYPERTENSION: Primary | Chronic | ICD-10-CM

## 2021-04-29 PROBLEM — R60.0 PERIPHERAL EDEMA: Status: ACTIVE | Noted: 2021-04-29

## 2021-04-29 PROBLEM — R60.0 PERIPHERAL EDEMA: Chronic | Status: ACTIVE | Noted: 2021-04-29

## 2021-04-29 PROCEDURE — 99214 OFFICE O/P EST MOD 30 MIN: CPT | Performed by: FAMILY MEDICINE

## 2021-04-29 NOTE — PROGRESS NOTES
"Chief Complaint  Hypertension    Subjective          Mariela Long presents to [facility]  Hypertension  This is a chronic problem. Current antihypertension treatment includes calcium channel blockers, ACE inhibitors and diuretics.     Changed from felodipine to amlodipine. No side effects. She has been taking medication faithfully. She urinates often. She continues to have peripheral edema.  Nocturia every 2 hours, her legs go down. Once she's moving legs swell again. She sometimes falls asleep in recliner with her chair. She had leg pain when she woke up and had dent from the back of the chair.         Objective   Vital Signs:   /82 (BP Location: Right arm, Patient Position: Sitting, Cuff Size: Large Adult)   Pulse 63   Ht 157.5 cm (62\")   Wt 108 kg (237 lb 6.4 oz)   SpO2 98%   BMI 43.42 kg/m²     Physical Exam  Vitals reviewed.   Constitutional:       General: She is not in acute distress.     Appearance: She is obese. She is not ill-appearing.   Cardiovascular:      Rate and Rhythm: Normal rate and regular rhythm.   Pulmonary:      Effort: Pulmonary effort is normal.      Breath sounds: Normal breath sounds.   Musculoskeletal:      Right lower leg: Edema present.      Left lower leg: Edema present.   Neurological:      Mental Status: She is alert.        Result Review :                 Assessment and Plan    Diagnoses and all orders for this visit:    1. Essential hypertension (Primary)  Improved.  Continue amlodipine, triamterene-hydrochlorothiazide, and lisinopril.  2. Peripheral edema  Worse.  Continue diuretics.  Also recommend starting the use of compression socks.      Follow Up   Return in about 22 weeks (around 9/30/2021) for Medicare Wellness, as scheduled.  Patient was given instructions and counseling regarding her condition or for health maintenance advice. Please see specific information pulled into the AVS if appropriate.   Electronically signed by Brittany PAYNE" Devaughn Ledezma MD, 04/29/21, 8:36 AM EDT.

## 2021-08-05 ENCOUNTER — OFFICE VISIT (OUTPATIENT)
Dept: PULMONOLOGY | Facility: CLINIC | Age: 81
End: 2021-08-05

## 2021-08-05 VITALS
HEART RATE: 77 BPM | OXYGEN SATURATION: 94 % | WEIGHT: 236 LBS | DIASTOLIC BLOOD PRESSURE: 90 MMHG | SYSTOLIC BLOOD PRESSURE: 164 MMHG | BODY MASS INDEX: 43.43 KG/M2 | TEMPERATURE: 97.8 F | HEIGHT: 62 IN

## 2021-08-05 DIAGNOSIS — J45.40 MODERATE PERSISTENT ASTHMA WITHOUT COMPLICATION: Chronic | ICD-10-CM

## 2021-08-05 DIAGNOSIS — Z91.09 ENVIRONMENTAL ALLERGIES: ICD-10-CM

## 2021-08-05 DIAGNOSIS — R07.9 CHEST PAIN, UNSPECIFIED TYPE: Primary | ICD-10-CM

## 2021-08-05 PROCEDURE — 99214 OFFICE O/P EST MOD 30 MIN: CPT | Performed by: NURSE PRACTITIONER

## 2021-08-05 RX ORDER — MONTELUKAST SODIUM 10 MG/1
10 TABLET ORAL NIGHTLY
Qty: 90 TABLET | Refills: 3 | Status: SHIPPED | OUTPATIENT
Start: 2021-08-05

## 2021-08-05 NOTE — PROGRESS NOTES
"[facility] Pul[facility] Follow up      Chief Complaint  Asthma    Subjective          Mariela Long presents to [facility] for routine follow-up on her asthma.  She was last in the office by Dr. Mathur in September of last year.    She has not noticed any worsening in her asthma.  She denies any worsening wheezing or cough.  However she has had some worsening shortness of breath with activity.  She denies a cough or sputum production.    She is also been having some chest pain recently, especially with activity.  2 weeks ago she had an episode of quite significant chest pain that she would describe an 8 out of 10.  The pain was midsternal and then radiated up to the left side of her neck.  It did frighten her quite a bit.  It lasted several minutes and she rated it an 8 out of 10.  She did think about calling EMS but the pain subsided.    She also has an occasional episode of orthostatic dizziness.  She denies any lower extremity edema.    She does continue to take her Breo daily.  She does use her albuterol on occasion, especially with the shortness of breath with activity.  She will notice occasionally it helps but not always.  She has been off her montelukast lately        Objective     Vital Signs:   /90   Pulse 77   Temp 97.8 °F (36.6 °C)   Ht 157.5 cm (62\")   Wt 107 kg (236 lb)   SpO2 94% Comment: resting at room air  BMI 43.16 kg/m²       Immunization History   Administered Date(s) Administered   • COVID-19 (PFIZER) 02/03/2021, 02/25/2021   • Fluzone High Dose =>65 Years ([employer] ONLY) 10/26/2016, 10/19/2017, 11/15/2017   • Hepatitis A 03/11/2019, 10/03/2019   • Pneumococcal Conjugate 13-Valent (PCV13) 04/19/2017   • Pneumococcal Polysaccharide (PPSV23) 12/16/2010, 11/15/2014   • Pneumococcal, Unspecified 03/16/2000   • Tdap 12/16/2011   • Zostavax 11/29/2009       Physical Exam  Vitals reviewed.   Constitutional:       General: She is not in acute " distress.     Appearance: She is well-developed. She is obese. She is not ill-appearing.   HENT:      Head: Normocephalic and atraumatic.   Eyes:      Pupils: Pupils are equal, round, and reactive to light.   Cardiovascular:      Rate and Rhythm: Normal rate and regular rhythm.      Heart sounds: No murmur heard.     Pulmonary:      Effort: Pulmonary effort is normal. No respiratory distress.      Breath sounds: Normal breath sounds. No wheezing or rales.   Abdominal:      General: Bowel sounds are normal. There is no distension.      Palpations: Abdomen is soft.   Musculoskeletal:         General: Normal range of motion.      Cervical back: Normal range of motion and neck supple.   Skin:     General: Skin is warm and dry.      Findings: No erythema.   Neurological:      Mental Status: She is alert and oriented to person, place, and time.   Psychiatric:         Behavior: Behavior normal.          Result Review :                         Assessment and Plan    Problem List Items Addressed This Visit        Allergies and Adverse Reactions    Environmental allergies    Overview     dust, pollen, trees, grass            Pulmonary and Pneumonias    Asthma (Chronic)    Relevant Medications    montelukast (SINGULAIR) 10 MG tablet      Other Visit Diagnoses     Chest pain, unspecified type    -  Primary    Relevant Orders    Ambulatory Referral to Cardiology          With this episode of chest pain as well as worsening shortness of breath with activity and seemingly controlled asthma symptoms I would like for her to have a cardiac evaluation.  She would like to see Dr. Oconnor, there are some other family members that see him as well.    After her evaluation with cardiology I would like for her to follow-up here in the office with a full PFT and chest x-ray.    At this time continue on her Breo and as needed albuterol.  I will renew her montelukast.    Follow Up     No follow-ups on file.  Patient was given instructions and  counseling regarding her condition or for health maintenance advice. Please see specific information pulled into the AVS if appropriate.       CLOVIS Seals, ACNP  [facility]  Electronically signed

## 2021-08-12 ENCOUNTER — TELEPHONE (OUTPATIENT)
Dept: FAMILY MEDICINE CLINIC | Facility: CLINIC | Age: 81
End: 2021-08-12

## 2021-08-12 NOTE — TELEPHONE ENCOUNTER
PATIENT THINKS SHE HAS A UTI AND WOULD LIKE MEDICATION.     PHARMACY:  [facility]    PLEASE CALL [phone]

## 2021-08-12 NOTE — TELEPHONE ENCOUNTER
Contacted patient and relayed PCP's message. She verbalized understanding and agreed to go to [facility]

## 2021-08-12 NOTE — TELEPHONE ENCOUNTER
She needs to be seen for an appointment unfortunately I do not have any appointments this afternoon.  Please schedule her with another available provider or I would go to [facility] urgent care.

## 2021-08-12 NOTE — TELEPHONE ENCOUNTER
Called and left  for patient to return call    Brittany Allen MD 29 minutes ago (11:56 AM)        She needs to be seen for an appointment unfortunately I do not have any appointments this afternoon.  Please schedule her with another available provider or I would go to [facility] urgent care.        OK FOR HUBB TO RELAY MESSAGE AND SCHEDULE APPOINTMENT

## 2021-09-07 ENCOUNTER — LAB (OUTPATIENT)
Dept: LAB | Facility: HOSPITAL | Age: 81
End: 2021-09-07

## 2021-09-07 DIAGNOSIS — I50.9 CONGESTIVE HEART FAILURE, UNSPECIFIED HF CHRONICITY, UNSPECIFIED HEART FAILURE TYPE (HCC): Primary | ICD-10-CM

## 2021-09-07 LAB
ANION GAP SERPL CALCULATED.3IONS-SCNC: 11.5 MMOL/L (ref 5–15)
BUN SERPL-MCNC: 19 MG/DL (ref 8–23)
BUN/CREAT SERPL: 22.6 (ref 7–25)
CALCIUM SPEC-SCNC: 9.3 MG/DL (ref 8.6–10.5)
CHLORIDE SERPL-SCNC: 106 MMOL/L (ref 98–107)
CO2 SERPL-SCNC: 23.5 MMOL/L (ref 22–29)
CREAT SERPL-MCNC: 0.84 MG/DL (ref 0.57–1)
GFR SERPL CREATININE-BSD FRML MDRD: 66 ML/MIN/1.73
GLUCOSE SERPL-MCNC: 97 MG/DL (ref 65–99)
NT-PROBNP SERPL-MCNC: 101.2 PG/ML (ref 0–1800)
POTASSIUM SERPL-SCNC: 5 MMOL/L (ref 3.5–5.2)
SODIUM SERPL-SCNC: 141 MMOL/L (ref 136–145)

## 2021-09-07 PROCEDURE — 36415 COLL VENOUS BLD VENIPUNCTURE: CPT

## 2021-09-07 PROCEDURE — 83880 ASSAY OF NATRIURETIC PEPTIDE: CPT

## 2021-09-07 PROCEDURE — 80048 BASIC METABOLIC PNL TOTAL CA: CPT

## 2021-09-30 ENCOUNTER — OFFICE VISIT (OUTPATIENT)
Dept: FAMILY MEDICINE CLINIC | Facility: CLINIC | Age: 81
End: 2021-09-30

## 2021-09-30 VITALS
BODY MASS INDEX: 43.79 KG/M2 | WEIGHT: 238 LBS | DIASTOLIC BLOOD PRESSURE: 80 MMHG | OXYGEN SATURATION: 96 % | HEIGHT: 62 IN | HEART RATE: 95 BPM | TEMPERATURE: 96 F | SYSTOLIC BLOOD PRESSURE: 130 MMHG

## 2021-09-30 DIAGNOSIS — I10 ESSENTIAL HYPERTENSION: Chronic | ICD-10-CM

## 2021-09-30 DIAGNOSIS — Z12.31 VISIT FOR SCREENING MAMMOGRAM: ICD-10-CM

## 2021-09-30 DIAGNOSIS — Z00.00 MEDICARE ANNUAL WELLNESS VISIT, SUBSEQUENT: Primary | ICD-10-CM

## 2021-09-30 DIAGNOSIS — E66.01 MORBIDLY OBESE (HCC): Chronic | ICD-10-CM

## 2021-09-30 DIAGNOSIS — Z13.0 SCREENING FOR DEFICIENCY ANEMIA: ICD-10-CM

## 2021-09-30 DIAGNOSIS — R73.03 PREDIABETES: Chronic | ICD-10-CM

## 2021-09-30 DIAGNOSIS — M54.31 SCIATICA, RIGHT SIDE: ICD-10-CM

## 2021-09-30 DIAGNOSIS — Z23 IMMUNIZATION DUE: ICD-10-CM

## 2021-09-30 DIAGNOSIS — E87.6 HYPOKALEMIA: ICD-10-CM

## 2021-09-30 DIAGNOSIS — K21.9 GASTROESOPHAGEAL REFLUX DISEASE: ICD-10-CM

## 2021-09-30 PROCEDURE — 90662 IIV NO PRSV INCREASED AG IM: CPT | Performed by: FAMILY MEDICINE

## 2021-09-30 PROCEDURE — G0439 PPPS, SUBSEQ VISIT: HCPCS | Performed by: FAMILY MEDICINE

## 2021-09-30 PROCEDURE — 0001A COVID-19 (PFIZER): CPT | Performed by: FAMILY MEDICINE

## 2021-09-30 PROCEDURE — G0008 ADMIN INFLUENZA VIRUS VAC: HCPCS | Performed by: FAMILY MEDICINE

## 2021-09-30 PROCEDURE — 91300 COVID-19 (PFIZER): CPT | Performed by: FAMILY MEDICINE

## 2021-09-30 RX ORDER — POTASSIUM CHLORIDE 20 MEQ/1
20 TABLET, EXTENDED RELEASE ORAL 2 TIMES DAILY
Qty: 180 TABLET | Refills: 3 | Status: CANCELLED | OUTPATIENT
Start: 2021-09-30

## 2021-09-30 RX ORDER — FUROSEMIDE 40 MG/1
40 TABLET ORAL DAILY
COMMUNITY
Start: 2021-08-24

## 2021-09-30 RX ORDER — LISINOPRIL 40 MG/1
40 TABLET ORAL NIGHTLY
Qty: 90 TABLET | Refills: 1 | Status: SHIPPED | OUTPATIENT
Start: 2021-09-30 | End: 2022-03-30 | Stop reason: SDUPTHER

## 2021-09-30 RX ORDER — OMEPRAZOLE 20 MG/1
20 CAPSULE, DELAYED RELEASE ORAL DAILY
Qty: 90 CAPSULE | Refills: 3 | Status: CANCELLED | OUTPATIENT
Start: 2021-09-30

## 2021-09-30 RX ORDER — POTASSIUM CHLORIDE 20 MEQ/1
TABLET, EXTENDED RELEASE ORAL
Qty: 270 TABLET | Refills: 3
Start: 2021-09-30 | End: 2022-11-14 | Stop reason: SDUPTHER

## 2021-09-30 RX ORDER — TRIAMTERENE AND HYDROCHLOROTHIAZIDE 37.5; 25 MG/1; MG/1
1 CAPSULE ORAL NIGHTLY
Qty: 90 CAPSULE | Refills: 1 | Status: CANCELLED | OUTPATIENT
Start: 2021-09-30

## 2021-09-30 RX ORDER — AMLODIPINE BESYLATE 10 MG/1
10 TABLET ORAL DAILY
Qty: 90 TABLET | Refills: 1 | Status: CANCELLED | OUTPATIENT
Start: 2021-09-30

## 2021-09-30 NOTE — PATIENT INSTRUCTIONS
Advance Care Planning and Advance Directives     You make decisions on a daily basis - decisions about where you want to live, your career, your home, your life. Perhaps one of the most important decisions you face is your choice for future medical care. Take time to talk with your family and your healthcare team and start planning today.  Advance Care Planning is a process that can help you:  · Understand possible future healthcare decisions in light of your own experiences  · Reflect on those decision in light of your goals and values  · Discuss your decisions with those closest to you and the healthcare professionals that care for you  · Make a plan by creating a document that reflects your wishes    Surrogate Decision Maker  In the event of a medical emergency, which has left you unable to communicate or to make your own decisions, you would need someone to make decisions for you.  It is important to discuss your preferences for medical treatment with this person while you are in good health.     Qualities of a surrogate decision maker:  • Willing to take on this role and responsibility  • Knows what you want for future medical care  • Willing to follow your wishes even if they don't agree with them  • Able to make difficult medical decisions under stressful circumstances    Advance Directives  These are legal documents you can create that will guide your healthcare team and decision maker(s) when needed. These documents can be stored in the electronic medical record.    · Living Will - a legal document to guide your care if you have a terminal condition or a serious illness and are unable to communicate. States vary by statute in document names/types, but most forms may include one or more of the following:        -  Directions regarding life-prolonging treatments        -  Directions regarding artificially provided nutrition/hydration        -  Choosing a healthcare decision maker        -  Direction  regarding organ/tissue donation    · Durable Power of  for Healthcare - this document names an -in-fact to make medical decisions for you, but it may also allow this person to make personal and financial decisions for you. Please seek the advice of an  if you need this type of document.    **Advance Directives are not required and no one may discriminate against you if you do not sign one.    Medical Orders  Many states allow specific forms/orders signed by your physician to record your wishes for medical treatment in your current state of health. This form, signed in personal communication with your physician, addresses resuscitation and other medical interventions that you may or may not want.      For more information or to schedule a time with a [facility] Advance Care Planning Facilitator contact: [facility] or call [phone] and someone will contact you directly.    Medicare Wellness  Personal Prevention Plan of Service     Date of Office Visit:    Encounter Provider:  Brittany Ledezma MD  Place of Service:  [facility]  Patient Name: Mariela Long  :  1940    As part of the Medicare Wellness portion of your visit today, we are providing you with this personalized preventive plan of services (PPPS). This plan is based upon recommendations of the [place] Preventive Services Task Force (USPSTF) and the Advisory Committee on Immunization Practices (ACIP).    This lists the preventive care services that should be considered, and provides dates of when you are due. Items listed as completed are up-to-date and do not require any further intervention.    Health Maintenance   Topic Date Due   • ZOSTER VACCINE (2 of 3) 2010   • DXA SCAN  2018   • HEPATITIS C SCREENING  Never done   • HEMOGLOBIN A1C  2021   • INFLUENZA VACCINE  10/01/2021   • TDAP/TD VACCINES (2 - Td or Tdap) 2021   • ANNUAL WELLNESS  VISIT  09/30/2022   • COVID-19 Vaccine  Completed   • Pneumococcal Vaccine 65+  Completed       Orders Placed This Encounter   Procedures   • Mammo Screening Digital Tomosynthesis Bilateral With CAD     Standing Status:   Future     Standing Expiration Date:   9/30/2022     Order Specific Question:   Reason for Exam:     Answer:   screen     Order Specific Question:   Release to patient     Answer:   Immediate   • MRI Lumbar Spine Without Contrast     Standing Status:   Future     Standing Expiration Date:   9/30/2022   • Fluzone High-Dose 65+yrs   • COVID-19 Vaccine (Pfizer)   • CBC (No Diff)     Standing Status:   Future     Standing Expiration Date:   9/30/2022     Order Specific Question:   Release to patient     Answer:   Immediate   • TSH Rfx On Abnormal To Free T4     Standing Status:   Future     Standing Expiration Date:   9/30/2022     Order Specific Question:   Release to patient     Answer:   Immediate   • Lipid Panel     Standing Status:   Future     Standing Expiration Date:   9/30/2022   • Hemoglobin A1c     Standing Status:   Future     Standing Expiration Date:   9/30/2022     Order Specific Question:   Release to patient     Answer:   Immediate   • Ambulatory Referral to Pain Management     Referral Priority:   Routine     Referral Type:   Pain Management     Referral Reason:   Specialty Services Required     Requested Specialty:   Pain Medicine     Number of Visits Requested:   1       Return in about 6 months (around 3/30/2022) for Office visit HTN, AWV and fasting labs 1 year.        Prediabetes Eating Plan  Prediabetes is a condition that causes blood sugar (glucose) levels to be higher than normal. This increases the risk for developing type 2 diabetes (type 2 diabetes mellitus). Working with a health care provider or nutrition specialist (dietitian) to make diet and lifestyle changes can help prevent the onset of diabetes. These changes may help you:  · Control your blood glucose  levels.  · Improve your cholesterol levels.  · Manage your blood pressure.  What are tips for following this plan?  Reading food labels  · Read food labels to check the amount of fat, salt (sodium), and sugar in prepackaged foods. Avoid foods that have:  ? Saturated fats.  ? Trans fats.  ? Added sugars.  · Avoid foods that have more than 300 milligrams (mg) of sodium per serving. Limit your sodium intake to less than 2,300 mg each day.  Shopping  · Avoid buying pre-made and processed foods.  · Avoid buying drinks with added sugar.  Cooking  · Cook with olive oil. Do not use butter, lard, or ghee.  · Bake, broil, grill, steam, or boil foods. Avoid frying.  Meal planning    · Work with your dietitian to create an eating plan that is right for you. This may include tracking how many calories you take in each day. Use a food diary, notebook, or mobile application to track what you eat at each meal.  · Consider following a Mediterranean diet. This includes:  ? Eating several servings of fresh fruits and vegetables each day.  ? Eating fish at least twice a week.  ? Eating one serving each day of whole grains, beans, nuts, and seeds.  ? Using olive oil instead of other fats.  ? Limiting alcohol.  ? Limiting red meat.  ? Using nonfat or low-fat dairy products.  · Consider following a plant-based diet. This includes dietary choices that focus on eating mostly vegetables and fruit, grains, beans, nuts, and seeds.  · If you have high blood pressure, you may need to limit your sodium intake or follow a diet such as the DASH (Dietary Approaches to Stop Hypertension) eating plan. The DASH diet aims to lower high blood pressure.    Lifestyle  · Set weight loss goals with help from your health care team. It is recommended that most people with prediabetes lose 7% of their body weight.  · Exercise for at least 30 minutes 5 or more days a week.  · Attend a support group or seek support from a mental health counselor.  · Take  over-the-counter and prescription medicines only as told by your health care provider.  What foods are recommended?  Fruits  Berries. Bananas. Apples. Oranges. Grapes. Papaya. Bob. Pomegranate. Kiwi. Grapefruit. Cherries.  Vegetables  Lettuce. Spinach. Peas. Beets. Cauliflower. Cabbage. Broccoli. Carrots. Tomatoes. Squash. Eggplant. Herbs. Peppers. Onions. Cucumbers. [place] sprouts.  Grains  Whole grains, such as whole-wheat or whole-grain breads, crackers, cereals, and pasta. Unsweetened oatmeal. Bulgur. Barley. Quinoa. Brown rice. Corn or whole-wheat flour tortillas or taco shells.  Meats and other proteins  Seafood. Poultry without skin. Lean cuts of pork and beef. Tofu. Eggs. Nuts. Beans.  Dairy  Low-fat or fat-free dairy products, such as yogurt, cottage cheese, and cheese.  Beverages  Water. Tea. Coffee. Sugar-free or diet soda. [place] water. Low-fat or nonfat milk. Milk alternatives, such as soy or almond milk.  Fats and oils  Olive oil. Canola oil. Sunflower oil. Grapeseed oil. Avocado. Walnuts.  Sweets and desserts  Sugar-free or low-fat pudding. Sugar-free or low-fat ice cream and other frozen treats.  Seasonings and condiments  Herbs. Sodium-free spices. Mustard. Relish. Low-salt, low-sugar ketchup. Low-salt, low-sugar barbecue sauce. Low-fat or fat-free mayonnaise.  The items listed above may not be a complete list of recommended foods and beverages. Contact a dietitian for more information.  What foods are not recommended?  Fruits  Fruits canned with syrup.  Vegetables  Canned vegetables. Frozen vegetables with butter or cream sauce.  Grains  Refined white flour and flour products, such as bread, pasta, snack foods, and cereals.  Meats and other proteins  Fatty cuts of meat. Poultry with skin. Breaded or fried meat. Processed meats.  Dairy  Full-fat yogurt, cheese, or milk.  Beverages  Sweetened drinks, such as iced tea and soda.  Fats and oils  Butter. Lard. Ghee.  Sweets and desserts  Baked  goods, such as cake, cupcakes, pastries, cookies, and cheesecake.  Seasonings and condiments  Spice mixes with added salt. Ketchup. Barbecue sauce. Mayonnaise.  The items listed above may not be a complete list of foods and beverages that are not recommended. Contact a dietitian for more information.  Where to find more information  · American Diabetes Association: www.diabetes.org  Summary  · You may need to make diet and lifestyle changes to help prevent the onset of diabetes. These changes can help you control blood sugar, improve cholesterol levels, and manage blood pressure.  · Set weight loss goals with help from your health care team. It is recommended that most people with prediabetes lose 7% of their body weight.  · Consider following a Mediterranean diet. This includes eating plenty of fresh fruits and vegetables, whole grains, beans, nuts, seeds, fish, and low-fat dairy, and using olive oil instead of other fats.  This information is not intended to replace advice given to you by your health care provider. Make sure you discuss any questions you have with your health care provider.  Document Revised: 03/18/2021 Document Reviewed: 03/18/2021  Elsevier Patient Education © 2021 [employer]

## 2021-09-30 NOTE — PROGRESS NOTES
The ABCs of the Annual Wellness Visit  Subsequent Medicare Wellness Visit    Chief Complaint   Patient presents with   • Medicare Wellness-subsequent     Pt states she is not fasting today.    • Annual Exam      Subjective    History of Present Illness:  Mariela Long is a 80 y.o. female who presents for a Subsequent Medicare Wellness Visit.    She is having problems with right sided sciatica and trouble walking. At home can't hardly do anything. She is able to walk at home. She does some exercises sometimes but hates physical therapy.     She eats a lot of carbs and sugar.         The following portions of the patient's history were reviewed and   updated as appropriate: allergies, current medications, past family history, past medical history, past social history, past surgical history and problem list.    Compared to one year ago, the patient feels her physical   health is worse.    Compared to one year ago, the patient feels her mental   health is the same.    Recent Hospitalizations:  She was not admitted to the hospital during the last year.       Current Medical Providers:  Patient Care Team:  Brittany Allen MD as PCP - General (Family Medicine)  Cristi Montana MD as Consulting Physician (Urology)  Carine Mathur DO as Consulting Physician (Pulmonary Disease)  Lalo Fish MD as Consulting Physician (Infectious Diseases)  Darren Oconnor MD as Consulting Physician (Cardiology)    Outpatient Medications Prior to Visit   Medication Sig Dispense Refill   • albuterol (ACCUNEB) 0.63 MG/3ML nebulizer solution Take 3 mL by nebulization Every 6 (Six) Hours As Needed for Wheezing. 24 vial 1   • albuterol sulfate  (90 Base) MCG/ACT inhaler Inhale 2 puffs Every 4 (Four) Hours As Needed for Wheezing. 18 g 6   • amLODIPine (NORVASC) 10 MG tablet Take 1 tablet by mouth Daily. 90 tablet 1   • Fluticasone Furoate-Vilanterol (BREO ELLIPTA) 200-25 MCG/INH inhaler Inhale 1 puff  "Daily. 3 each 3   • furosemide (LASIX) 40 MG tablet Take 40 mg by mouth Daily.     • loratadine (CLARITIN) 10 MG tablet Take 10 mg by mouth Daily.     • montelukast (SINGULAIR) 10 MG tablet Take 1 tablet by mouth Every Night. 90 tablet 3   • omeprazole (priLOSEC) 20 MG capsule Take 1 capsule by mouth Daily. 90 capsule 3   • oxybutynin XL (DITROPAN XL) 15 MG 24 hr tablet oxybutynin chloride ER 15 mg tablet,extended release 24 hr   Take 1 tablet by mouth once daily     • triamterene-hydrochlorothiazide (DYAZIDE) 37.5-25 MG per capsule Take 1 capsule by mouth Every Night. 90 capsule 1   • lisinopril (PRINIVIL,ZESTRIL) 40 MG tablet Take 1 tablet by mouth Every Night. 90 tablet 1   • potassium chloride (K-DUR,KLOR-CON) 20 MEQ CR tablet Take 1 tablet by mouth 2 (Two) Times a Day. 180 tablet 3     No facility-administered medications prior to visit.       No opioid medication identified on active medication list. I have reviewed chart for other potential  high risk medication/s and harmful drug interactions in the elderly.          Aspirin is not on active medication list.  Aspirin use is not indicated based on review of current medical condition/s. Risk of harm outweighs potential benefits.  .    Patient Active Problem List   Diagnosis   • Environmental allergies   • Essential hypertension   • Hypokalemia   • Gastroesophageal reflux disease   • Ground glass opacity present on imaging of lung   • Asthma   • Morbidly obese (CMS/HCC)   • Prediabetes   • Cough   • Seasonal allergic rhinitis   • Urge incontinence of urine   • Aortic aneurysm (CMS/HCC)   • Peripheral edema     Advance Care Planning  Advance Directive is not on file.  ACP discussion was held with the patient during this visit. Patient has an advance directive (not in EMR), copy requested.          Objective    Vitals:    09/30/21 1032   BP: 130/80   Pulse: 95   Temp: 96 °F (35.6 °C)   SpO2: 96%   Weight: 108 kg (238 lb)   Height: 157.5 cm (62\")   PainSc: 0-No " pain     BMI Readings from Last 1 Encounters:   09/30/21 43.53 kg/m²   BMI is above normal parameters. Recommendations include: nutrition counseling    Does the patient have evidence of cognitive impairment? No    Physical Exam  Constitutional:       General: She is not in acute distress.     Appearance: She is morbidly obese.   HENT:      Right Ear: Tympanic membrane and ear canal normal.      Left Ear: Tympanic membrane and ear canal normal.   Eyes:      General:         Right eye: No discharge.         Left eye: No discharge.      Conjunctiva/sclera: Conjunctivae normal.   Neck:      Thyroid: No thyromegaly.   Cardiovascular:      Rate and Rhythm: Normal rate and regular rhythm.   Pulmonary:      Effort: Pulmonary effort is normal.      Breath sounds: Normal breath sounds.   Abdominal:      Palpations: Abdomen is soft.      Tenderness: There is no abdominal tenderness.   Musculoskeletal:      Cervical back: Neck supple.      Lumbar back: No deformity, spasms or bony tenderness. Positive right straight leg raise test. Negative left straight leg raise test.   Lymphadenopathy:      Head:      Right side of head: No submandibular, preauricular or posterior auricular adenopathy.      Left side of head: No submandibular, preauricular or posterior auricular adenopathy.      Cervical: No cervical adenopathy.   Skin:     General: Skin is warm and dry.   Neurological:      Mental Status: She is alert and oriented to person, place, and time.   Psychiatric:         Mood and Affect: Mood normal.         Behavior: Behavior normal.         Thought Content: Thought content normal.         Judgment: Judgment normal.                 HEALTH RISK ASSESSMENT    Smoking Status:  Social History     Tobacco Use   Smoking Status Never Smoker   Smokeless Tobacco Never Used     Alcohol Consumption:  Social History     Substance and Sexual Activity   Alcohol Use None     Fall Risk Screen:    STEADI Fall Risk Assessment was completed, and  patient is at LOW risk for falls.Assessment completed on:9/30/2021   KENNEDI Fall Risk Clinician Key Questions   Have you fallen in the past year?: No  Do you feel unsteady with walking?: Yes  Are you worried about falling?: No    Stay Idependant Patient Questions   Patient Fall Risk Assessment Score : 0        Depression Screening:  PHQ-2/PHQ-9 Depression Screening 9/30/2021   Little interest or pleasure in doing things 0   Feeling down, depressed, or hopeless 0   Total Score 0       Health Habits and Functional and Cognitive Screening:  Functional & Cognitive Status 9/30/2021   Do you have difficulty preparing food and eating? No   Do you have difficulty bathing yourself, getting dressed or grooming yourself? No   Do you have difficulty using the toilet? No   Do you have difficulty moving around from place to place? No   Do you have trouble with steps or getting out of a bed or a chair? Yes   Current Diet Unhealthy Diet   Dental Exam Up to date   Eye Exam Up to date   Exercise (times per week) 0 times per week   Current Exercises Include No Regular Exercise   Current Exercise Activities Include -   Do you need help using the phone?  No   Are you deaf or do you have serious difficulty hearing?  No   Do you need help with transportation? No   Do you need help shopping? No   Do you need help preparing meals?  No   Do you need help with housework?  No   Do you need help with laundry? No   Do you need help taking your medications? No   Do you need help managing money? No   Do you ever drive or ride in a car without wearing a seat belt? No   Have you felt unusual stress, anger or loneliness in the last month? No   Who do you live with? Alone   If you need help, do you have trouble finding someone available to you? No   Have you been bothered in the last four weeks by sexual problems? No   Do you have difficulty concentrating, remembering or making decisions? No       Age-appropriate Screening Schedule:  Refer to the list  below for future screening recommendations based on patient's age, sex and/or medical conditions. Orders for these recommended tests are listed in the plan section. The patient has been provided with a written plan.    Health Maintenance   Topic Date Due   • ZOSTER VACCINE (2 of 3) 01/24/2010   • DXA SCAN  04/26/2018   • HEMOGLOBIN A1C  04/01/2021   • INFLUENZA VACCINE  10/01/2021   • TDAP/TD VACCINES (2 - Td or Tdap) 12/16/2021              Immunization History   Administered Date(s) Administered   • COVID-19 (PFIZER) 02/03/2021, 02/25/2021, 09/30/2021   • Fluzone High Dose =>65 Years ([facility] ONLY) 10/26/2016, 10/19/2017, 11/15/2017   • Fluzone High-Dose 65+yrs 09/30/2021   • Hepatitis A 03/11/2019, 10/03/2019   • Pneumococcal Conjugate 13-Valent (PCV13) 04/19/2017   • Pneumococcal Polysaccharide (PPSV23) 12/16/2010, 11/15/2014   • Pneumococcal, Unspecified 03/16/2000   • Tdap 12/16/2011   • Zostavax 11/29/2009       Assessment/Plan   CMS Preventative Services Quick Reference  Risk Factors Identified During Encounter  Chronic Pain   Immunizations Discussed/Encouraged (specific Immunizations; Influenza, Shingrix and COVID19  Obesity/Overweight   The above risks/problems have been discussed with the patient.  Follow up actions/plans if indicated are seen below in the Assessment/Plan Section.  Pertinent information has been shared with the patient in the After Visit Summary.    Diagnoses and all orders for this visit:    1. Medicare annual wellness visit, subsequent (Primary)  Return when fasting to check labs.  2. Prediabetes  -     Hemoglobin A1c; Future  Patient currently has high carbohydrate and sugar intake.  Discussed dietary changes to prevent diabetes.  3. Morbidly obese (CMS/[facility], located [facility])  Patient's (Body mass index is 43.53 kg/m².) indicates that they are morbidly obese (BMI > 40 or > 35 with obesity - related health condition) with health related conditions that include hypertension and GERD . Weight is unchanged.  BMI is is above average; BMI management plan is completed. We discussed low calorie, low carb based diet program.     4. Essential hypertension  -     TSH Rfx On Abnormal To Free T4; Future  -     Lipid Panel; Future  -     lisinopril (PRINIVIL,ZESTRIL) 40 MG tablet; Take 1 tablet by mouth Every Night.  Dispense: 90 tablet; Refill: 1  Continue amlodipine, lisinopril, triamterene hydrochlorothiazide, potassium supplementation.  5. Immunization due  -      Shingrix Vaccine-Printed Order for Patient  -     Fluzone High-Dose 65+yrs  -     COVID-19 Vaccine (Pfizer)  Flu and Covid vaccine today.  Shingrix at local pharmacy.  6. Hypokalemia  -     potassium chloride (K-DUR,KLOR-CON) 20 MEQ CR tablet; Take 2 tablets by mouth Every Morning AND 1 tablet Every Night.  Dispense: 270 tablet; Refill: 3  Her cardiologist increase potassium in the morning and she did not want a refill today.  7. Gastroesophageal reflux disease  Continue PPI.  8. Screening for deficiency anemia  -     CBC (No Diff); Future    9. Visit for screening mammogram  -     Mammo Screening Digital Tomosynthesis Bilateral With CAD; Future  Last mammogram 2019.  10. Sciatica, right side  -     Ambulatory Referral to Pain Management  -     MRI Lumbar Spine Without Contrast; Future  New.  Recommend physical therapy which she declined.  She is interested in injection therapy will refer to pain management with MRI ordered as well.        Follow Up:   Return in about 6 months (around 3/30/2022) for Office visit HTN, AWV and fasting labs 1 year.     An After Visit Summary and PPPS were made available to the patient.                 Electronically signed by Brittany Ledezma MD, 09/30/21, 11:02 AM EDT.

## 2021-10-06 ENCOUNTER — LAB (OUTPATIENT)
Dept: LAB | Facility: HOSPITAL | Age: 81
End: 2021-10-06

## 2021-10-06 DIAGNOSIS — I10 ESSENTIAL HYPERTENSION: Chronic | ICD-10-CM

## 2021-10-06 DIAGNOSIS — R73.03 PREDIABETES: Chronic | ICD-10-CM

## 2021-10-06 DIAGNOSIS — Z13.0 SCREENING FOR DEFICIENCY ANEMIA: ICD-10-CM

## 2021-10-06 LAB
CHOLEST SERPL-MCNC: 165 MG/DL (ref 0–200)
DEPRECATED RDW RBC AUTO: 48.9 FL (ref 37–54)
ERYTHROCYTE [DISTWIDTH] IN BLOOD BY AUTOMATED COUNT: 14.4 % (ref 12.3–15.4)
HBA1C MFR BLD: 5.4 % (ref 4.8–5.6)
HCT VFR BLD AUTO: 36.8 % (ref 34–46.6)
HDLC SERPL-MCNC: 52 MG/DL (ref 40–60)
HGB BLD-MCNC: 12 G/DL (ref 12–15.9)
LDLC SERPL CALC-MCNC: 99 MG/DL (ref 0–100)
LDLC/HDLC SERPL: 1.89 {RATIO}
MCH RBC QN AUTO: 29.8 PG (ref 26.6–33)
MCHC RBC AUTO-ENTMCNC: 32.6 G/DL (ref 31.5–35.7)
MCV RBC AUTO: 91.3 FL (ref 79–97)
PLATELET # BLD AUTO: 266 10*3/MM3 (ref 140–450)
PMV BLD AUTO: 11.6 FL (ref 6–12)
RBC # BLD AUTO: 4.03 10*6/MM3 (ref 3.77–5.28)
TRIGL SERPL-MCNC: 74 MG/DL (ref 0–150)
TSH SERPL DL<=0.05 MIU/L-ACNC: 3.56 UIU/ML (ref 0.27–4.2)
VLDLC SERPL-MCNC: 14 MG/DL (ref 5–40)
WBC # BLD AUTO: 5.88 10*3/MM3 (ref 3.4–10.8)

## 2021-10-06 PROCEDURE — 85027 COMPLETE CBC AUTOMATED: CPT

## 2021-10-06 PROCEDURE — 80061 LIPID PANEL: CPT

## 2021-10-06 PROCEDURE — 83036 HEMOGLOBIN GLYCOSYLATED A1C: CPT

## 2021-10-06 PROCEDURE — 84443 ASSAY THYROID STIM HORMONE: CPT

## 2021-10-24 ENCOUNTER — HOSPITAL ENCOUNTER (OUTPATIENT)
Dept: MRI IMAGING | Facility: HOSPITAL | Age: 81
Discharge: HOME | End: 2021-10-24
Admitting: FAMILY MEDICINE

## 2021-10-24 DIAGNOSIS — M54.31 SCIATICA, RIGHT SIDE: ICD-10-CM

## 2021-10-24 PROCEDURE — 72148 MRI LUMBAR SPINE W/O DYE: CPT

## 2021-11-03 ENCOUNTER — OFFICE VISIT (OUTPATIENT)
Dept: PULMONOLOGY | Facility: CLINIC | Age: 81
End: 2021-11-03

## 2021-11-03 VITALS
HEART RATE: 80 BPM | HEIGHT: 62 IN | BODY MASS INDEX: 44.9 KG/M2 | SYSTOLIC BLOOD PRESSURE: 138 MMHG | WEIGHT: 244 LBS | OXYGEN SATURATION: 100 % | DIASTOLIC BLOOD PRESSURE: 90 MMHG | TEMPERATURE: 98.1 F

## 2021-11-03 DIAGNOSIS — Z91.09 ENVIRONMENTAL ALLERGIES: ICD-10-CM

## 2021-11-03 DIAGNOSIS — R60.9 PERIPHERAL EDEMA: Chronic | ICD-10-CM

## 2021-11-03 DIAGNOSIS — J45.909 ASTHMA, UNSPECIFIED ASTHMA SEVERITY, UNSPECIFIED WHETHER COMPLICATED, UNSPECIFIED WHETHER PERSISTENT: Primary | ICD-10-CM

## 2021-11-03 PROCEDURE — 94729 DIFFUSING CAPACITY: CPT | Performed by: NURSE PRACTITIONER

## 2021-11-03 PROCEDURE — 99214 OFFICE O/P EST MOD 30 MIN: CPT | Performed by: NURSE PRACTITIONER

## 2021-11-03 PROCEDURE — 94375 RESPIRATORY FLOW VOLUME LOOP: CPT | Performed by: NURSE PRACTITIONER

## 2021-11-03 PROCEDURE — 94726 PLETHYSMOGRAPHY LUNG VOLUMES: CPT | Performed by: NURSE PRACTITIONER

## 2021-11-03 RX ORDER — CIPROFLOXACIN 250 MG/1
TABLET, FILM COATED ORAL EVERY 12 HOURS SCHEDULED
COMMUNITY
End: 2021-12-19

## 2021-11-03 NOTE — PROGRESS NOTES
"[facility] Pulmonary Follow up      Chief Complaint  Asthma, Shortness of Breath, and Follow-up    Subjective          Mariela Long presents to [facility] for routine follow-up.  We see her here in the office for some asthma.  I saw her couple months ago for some worsening shortness of breath she is back today to do her PFTs.    She has noticed that her shortness of breath may be related to her worsening edema.  Sometimes she skips her medications, especially her diuretic, and will notice worsening swelling.  We did discuss the importance of taking her medications properly and how that will relate to her shortness of breath.    She is still trying to be active daily.  She takes her Breo daily.  She does notice a cough especially while at work.  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    She denies any recent acute exacerbations or illnesses.  No use of her rescue inhaler.      Objective     Vital Signs:   /90   Pulse 80   Temp 98.1 °F (36.7 °C)   Ht 157.5 cm (62\")   Wt 111 kg (244 lb)   SpO2 100% Comment: resting, room air  BMI 44.63 kg/m²       Immunization History   Administered Date(s) Administered   • COVID-19 (PFIZER) 02/03/2021, 02/25/2021, 09/30/2021   • Fluzone High Dose =>65 Years ([employer] ONLY) 10/26/2016, 10/19/2017, 11/15/2017   • Fluzone High-Dose 65+yrs 09/30/2021   • Hepatitis A 03/11/2019, 10/03/2019   • Pneumococcal Conjugate 13-Valent (PCV13) 04/19/2017   • Pneumococcal Polysaccharide (PPSV23) 12/16/2010, 11/15/2014   • Pneumococcal, Unspecified 03/16/2000   • Tdap 12/16/2011   • Zostavax 11/29/2009       Physical Exam  Vitals reviewed.   Constitutional:       Appearance: She is well-developed. She is obese.   HENT:      Head: Normocephalic and atraumatic.   Eyes:      Pupils: Pupils are equal, round, and reactive to light.   Cardiovascular:    "   Rate and Rhythm: Normal rate and regular rhythm.      Heart sounds: No murmur heard.      Pulmonary:      Effort: Pulmonary effort is normal. No respiratory distress.      Breath sounds: Normal breath sounds. No wheezing or rales.   Abdominal:      General: Bowel sounds are normal. There is no distension.      Palpations: Abdomen is soft.   Musculoskeletal:         General: Normal range of motion.      Cervical back: Normal range of motion and neck supple.      Right lower leg: Edema present.      Left lower leg: Edema present.   Skin:     General: Skin is warm and dry.      Findings: No erythema.   Neurological:      Mental Status: She is alert and oriented to person, place, and time.   Psychiatric:         Behavior: Behavior normal.          Result Review :            PFTs done in the office today:  No obstruction or restriction normal PFTs.  Decreased DLCO.  No changes from 2020.    PA and lateral chest x-ray done the office today reviewed by me  Awaiting final MD interpretation                 Assessment and Plan    Problem List Items Addressed This Visit        Allergies and Adverse Reactions    Environmental allergies    Overview     dust, pollen, trees, grass            Pulmonary and Pneumonias    Asthma - Primary (Chronic)    Relevant Orders    XR Chest PA & Lateral    Pulmonary Function Test (Completed)       Symptoms and Signs    Peripheral edema (Chronic)        We reviewed her PFTs and chest x-ray today in the office.  It appears like her asthma and asthma symptoms are stable.    As noted above we did discuss the importance of taking her medications, especially her diuretics.  She does have quite a bit of edema today, and how that relates to her worsening shortness of breath.    We also discussed that her environmental allergens will cause some worsening in her shortness of breath with her asthma.    Continue on her Breo and montelukast daily.    Follow Up     No follow-ups on file.  Patient was given  instructions and counseling regarding her condition or for health maintenance advice. Please see specific information pulled into the AVS if appropriate.     I spent 35 minutes caring for Mariela on this date of service. This time includes time spent by me in the following activities:preparing for the visit, reviewing tests, obtaining and/or reviewing a separately obtained history, performing a medically appropriate examination and/or evaluation , counseling and educating the patient/family/caregiver, ordering medications, tests, or procedures, referring and communicating with other health care professionals , documenting information in the medical record and independently interpreting results and communicating that information with the patient/family/caregiver    Moderate level of Medical Decision Making complexity based on 2 or more chronic stable illnesses and prescription drug management.    CLOVIS Seals, ACNP  [facility]  Electronically signed

## 2021-11-22 ENCOUNTER — TELEPHONE (OUTPATIENT)
Dept: FAMILY MEDICINE CLINIC | Facility: CLINIC | Age: 81
End: 2021-11-22

## 2021-11-22 NOTE — TELEPHONE ENCOUNTER
Contacted patient and advised her that her results have been placed at patient pickup,she asked for the disc that contains the MRI images. I advised her to contact the main hospital to request this to be made. She verbalized understanding and agreed

## 2021-11-22 NOTE — TELEPHONE ENCOUNTER
Caller: Mariela Long    Relationship: Self    Best call back number: [phone]    What form or medical record are you requesting: COPY OF MRI     Who is requesting this form or medical record from you: [facility]    How would you like to receive the form or medical records (pick-up, mail, fax):  AT OFICE    Timeframe paperwork needed: TODAY

## 2022-03-30 ENCOUNTER — OFFICE VISIT (OUTPATIENT)
Dept: FAMILY MEDICINE CLINIC | Facility: CLINIC | Age: 82
End: 2022-03-30

## 2022-03-30 ENCOUNTER — LAB (OUTPATIENT)
Dept: LAB | Facility: HOSPITAL | Age: 82
End: 2022-03-30

## 2022-03-30 VITALS
HEIGHT: 63 IN | HEART RATE: 81 BPM | BODY MASS INDEX: 43.7 KG/M2 | WEIGHT: 246.6 LBS | OXYGEN SATURATION: 95 % | DIASTOLIC BLOOD PRESSURE: 88 MMHG | SYSTOLIC BLOOD PRESSURE: 138 MMHG

## 2022-03-30 DIAGNOSIS — I10 ESSENTIAL HYPERTENSION: Chronic | ICD-10-CM

## 2022-03-30 DIAGNOSIS — Z13.0 SCREENING FOR DEFICIENCY ANEMIA: ICD-10-CM

## 2022-03-30 DIAGNOSIS — I10 ESSENTIAL HYPERTENSION: Primary | Chronic | ICD-10-CM

## 2022-03-30 DIAGNOSIS — R25.2 MUSCLE CRAMPING: ICD-10-CM

## 2022-03-30 DIAGNOSIS — R29.6 FREQUENT FALLS: ICD-10-CM

## 2022-03-30 DIAGNOSIS — R60.9 PERIPHERAL EDEMA: Chronic | ICD-10-CM

## 2022-03-30 DIAGNOSIS — L65.9 HAIR LOSS: ICD-10-CM

## 2022-03-30 DIAGNOSIS — R26.81 UNSTABLE GAIT: ICD-10-CM

## 2022-03-30 LAB
ALBUMIN SERPL-MCNC: 4.5 G/DL (ref 3.5–5.2)
ALBUMIN/GLOB SERPL: 1.4 G/DL
ALP SERPL-CCNC: 103 U/L (ref 39–117)
ALT SERPL W P-5'-P-CCNC: 18 U/L (ref 1–33)
ANION GAP SERPL CALCULATED.3IONS-SCNC: 13.4 MMOL/L (ref 5–15)
AST SERPL-CCNC: 23 U/L (ref 1–32)
BILIRUB SERPL-MCNC: 0.3 MG/DL (ref 0–1.2)
BUN SERPL-MCNC: 21 MG/DL (ref 8–23)
BUN/CREAT SERPL: 22.3 (ref 7–25)
CALCIUM SPEC-SCNC: 9.7 MG/DL (ref 8.6–10.5)
CHLORIDE SERPL-SCNC: 104 MMOL/L (ref 98–107)
CO2 SERPL-SCNC: 25.6 MMOL/L (ref 22–29)
CREAT SERPL-MCNC: 0.94 MG/DL (ref 0.57–1)
DEPRECATED RDW RBC AUTO: 47.4 FL (ref 37–54)
EGFRCR SERPLBLD CKD-EPI 2021: 61.9 ML/MIN/1.73
ERYTHROCYTE [DISTWIDTH] IN BLOOD BY AUTOMATED COUNT: 14 % (ref 12.3–15.4)
GLOBULIN UR ELPH-MCNC: 3.2 GM/DL
GLUCOSE SERPL-MCNC: 111 MG/DL (ref 65–99)
HCT VFR BLD AUTO: 37.7 % (ref 34–46.6)
HGB BLD-MCNC: 11.9 G/DL (ref 12–15.9)
MAGNESIUM SERPL-MCNC: 1.9 MG/DL (ref 1.6–2.4)
MCH RBC QN AUTO: 28.7 PG (ref 26.6–33)
MCHC RBC AUTO-ENTMCNC: 31.6 G/DL (ref 31.5–35.7)
MCV RBC AUTO: 91.1 FL (ref 79–97)
PLATELET # BLD AUTO: 253 10*3/MM3 (ref 140–450)
PMV BLD AUTO: 11.9 FL (ref 6–12)
POTASSIUM SERPL-SCNC: 4.8 MMOL/L (ref 3.5–5.2)
PROT SERPL-MCNC: 7.7 G/DL (ref 6–8.5)
RBC # BLD AUTO: 4.14 10*6/MM3 (ref 3.77–5.28)
SODIUM SERPL-SCNC: 143 MMOL/L (ref 136–145)
TSH SERPL DL<=0.05 MIU/L-ACNC: 2.23 UIU/ML (ref 0.27–4.2)
WBC NRBC COR # BLD: 7.11 10*3/MM3 (ref 3.4–10.8)

## 2022-03-30 PROCEDURE — 83735 ASSAY OF MAGNESIUM: CPT

## 2022-03-30 PROCEDURE — 80053 COMPREHEN METABOLIC PANEL: CPT

## 2022-03-30 PROCEDURE — 85027 COMPLETE CBC AUTOMATED: CPT

## 2022-03-30 PROCEDURE — 84443 ASSAY THYROID STIM HORMONE: CPT

## 2022-03-30 PROCEDURE — 99214 OFFICE O/P EST MOD 30 MIN: CPT | Performed by: FAMILY MEDICINE

## 2022-03-30 RX ORDER — LISINOPRIL 40 MG/1
40 TABLET ORAL NIGHTLY
Qty: 90 TABLET | Refills: 1 | Status: SHIPPED | OUTPATIENT
Start: 2022-03-30 | End: 2022-10-14 | Stop reason: SDUPTHER

## 2022-03-30 RX ORDER — LISINOPRIL 40 MG/1
40 TABLET ORAL DAILY
Qty: 30 TABLET | Refills: 0 | Status: SHIPPED | OUTPATIENT
Start: 2022-03-30 | End: 2022-05-09

## 2022-03-30 RX ORDER — TRIAMTERENE AND HYDROCHLOROTHIAZIDE 37.5; 25 MG/1; MG/1
1 CAPSULE ORAL NIGHTLY
Qty: 90 CAPSULE | Refills: 1 | Status: SHIPPED | OUTPATIENT
Start: 2022-03-30 | End: 2022-05-12

## 2022-03-30 RX ORDER — AMLODIPINE BESYLATE 5 MG/1
5 TABLET ORAL DAILY
Qty: 90 TABLET | Refills: 1 | Status: SHIPPED | OUTPATIENT
Start: 2022-03-30 | End: 2022-10-14 | Stop reason: SDUPTHER

## 2022-03-30 RX ORDER — AMLODIPINE BESYLATE 5 MG/1
5 TABLET ORAL DAILY
Qty: 30 TABLET | Refills: 0 | Status: SHIPPED | OUTPATIENT
Start: 2022-03-30 | End: 2022-04-29

## 2022-03-30 NOTE — PROGRESS NOTES
"Chief Complaint  Hypertension (6 month f/u), Leg Swelling, Fall (Pt states that she has randomly falling. Pt states that she hasnt been dizzy at all, but will just randomly fall. ), Hair/Scalp Problem, Muscle Pain, and Itching    Subjective          Mariela Long presents to [facility]  History of Present Illness     Leg and swelling and takes lasix in the morning. After working at night, legs are swollen again. Worse on the right.     She has fallen a couple times. She feel out of shower, not dizzy or lightheaded. She fell going in the front door at work, his head on the wall.     She is loosing her hair. Scalp and back itching without bumps.     Muscle cramps in legs. Worse with movement. She is on potassium. She can't walk. She can't walk straight, feels unsteady on her feet. She needs walking stick or 1 person assist.     She has trouble with right sciatica and had shots.     Right shoulder injury followed by orthopedics. She had neck and spine MRI. Also had MRI of shoulder and gets results tomorrow.     Objective   Vital Signs:   /88   Pulse 81   Ht 160 cm (62.99\")   Wt 112 kg (246 lb 9.6 oz)   SpO2 95%   BMI 43.69 kg/m²            Physical Exam  Vitals reviewed.   Constitutional:       General: She is not in acute distress.     Appearance: She is morbidly obese. She is not ill-appearing.   Cardiovascular:      Rate and Rhythm: Normal rate and regular rhythm.   Pulmonary:      Effort: Pulmonary effort is normal.      Breath sounds: Normal breath sounds.   Musculoskeletal:      Right lower leg: Edema present.      Left lower leg: Edema present.   Neurological:      Mental Status: She is alert.      Coordination: Coordination is intact. Romberg sign negative. Coordination normal. Finger-Nose-Finger Test and Heel to Shin Test normal.      Gait: Gait abnormal ( leaning to side without falling).      Deep Tendon Reflexes:      Reflex Scores:       Patellar reflexes are 1+ " on the right side and 1+ on the left side.     Comments: No foot drop        Result Review :            SCANNED - ECHOCARDIOGRAM (08/24/2021)       Assessment and Plan    Diagnoses and all orders for this visit:    1. Essential hypertension (Primary)  -     amLODIPine (NORVASC) 5 MG tablet; Take 1 tablet by mouth Daily.  Dispense: 90 tablet; Refill: 1  -     triamterene-hydrochlorothiazide (DYAZIDE) 37.5-25 MG per capsule; Take 1 capsule by mouth Every Night.  Dispense: 90 capsule; Refill: 1  -     lisinopril (PRINIVIL,ZESTRIL) 40 MG tablet; Take 1 tablet by mouth Every Night.  Dispense: 90 tablet; Refill: 1  -     amLODIPine (NORVASC) 5 MG tablet; Take 1 tablet by mouth Daily for 30 days.  Dispense: 30 tablet; Refill: 0  -     lisinopril (PRINIVIL,ZESTRIL) 40 MG tablet; Take 1 tablet by mouth Daily for 30 days.  Dispense: 30 tablet; Refill: 0    -     Comprehensive Metabolic Panel; Future  -     TSH Rfx On Abnormal To Free T4; Future  Possible worsening peripheral edema related to amlodipine use.  At this time decrease amlodipine to 5 mg.  Continue lisinopril 40 mg.  Restart triamterene 37.5 hydrochlorothiazide 25.  Check next month.  2. Frequent falls  -     MRI Brain Without Contrast; Future  -     Ambulatory Referral to Neurology  Concern for neurological cause.  Further evaluation with imaging and specialty consultation.  3. Peripheral edema  Continue losing Lasix.  Check labs today.  4. Unstable gait  -     MRI Brain Without Contrast; Future  -     Ambulatory Referral to Neurology  Concern for neurological cause.  Further evaluation with imaging and specialty consultation.  5. Muscle cramping  -     Magnesium; Future  -     Ambulatory Referral to Neurology  Check labs today  6. Hair loss  -     TSH Rfx On Abnormal To Free T4; Future  Labs today.  7. Screening for deficiency anemia  -     CBC (No Diff); Future            Follow Up   Return in about 1 month (around 4/30/2022) for Office visit HTN, edema, f/u  MRI.  Patient was given instructions and counseling regarding her condition or for health maintenance advice. Please see specific information pulled into the AVS if appropriate.     Electronically signed by Brittany Ledezma MD, 03/30/22, 11:13 AM EDT.

## 2022-04-10 ENCOUNTER — HOSPITAL ENCOUNTER (OUTPATIENT)
Dept: MRI IMAGING | Facility: HOSPITAL | Age: 82
Discharge: HOME | End: 2022-04-10
Admitting: FAMILY MEDICINE

## 2022-04-10 DIAGNOSIS — R29.6 FREQUENT FALLS: ICD-10-CM

## 2022-04-10 DIAGNOSIS — R26.81 UNSTABLE GAIT: ICD-10-CM

## 2022-04-10 PROCEDURE — 70551 MRI BRAIN STEM W/O DYE: CPT

## 2022-05-09 ENCOUNTER — OFFICE VISIT (OUTPATIENT)
Dept: NEUROLOGY | Facility: CLINIC | Age: 82
End: 2022-05-09

## 2022-05-09 VITALS
HEIGHT: 63 IN | SYSTOLIC BLOOD PRESSURE: 138 MMHG | BODY MASS INDEX: 41.53 KG/M2 | TEMPERATURE: 97.3 F | OXYGEN SATURATION: 97 % | HEART RATE: 89 BPM | WEIGHT: 234.4 LBS | DIASTOLIC BLOOD PRESSURE: 86 MMHG

## 2022-05-09 DIAGNOSIS — R90.82 WHITE MATTER DISEASE: Primary | ICD-10-CM

## 2022-05-09 DIAGNOSIS — M48.07 SPINAL STENOSIS OF LUMBOSACRAL REGION: ICD-10-CM

## 2022-05-09 DIAGNOSIS — I10 PRIMARY HYPERTENSION: ICD-10-CM

## 2022-05-09 PROCEDURE — 99214 OFFICE O/P EST MOD 30 MIN: CPT | Performed by: NURSE PRACTITIONER

## 2022-05-09 RX ORDER — MULTIPLE VITAMINS W/ MINERALS TAB 9MG-400MCG
1 TAB ORAL DAILY
COMMUNITY

## 2022-05-09 NOTE — PROGRESS NOTES
Neuro Office Visit      Encounter Date: 2022   Patient Name: Mariela Long  : 1940   MRN: [id number]   PCP: Dr Ledezma  Chief Complaint:    Chief Complaint   Patient presents with   • Frequent falls       History of Present Illness: Mariela Long is a 81 y.o. female who is here today in Neurology falls and abnormal MRI of brain.    Falls   Pt feels her symptoms are due to spinal stenosis. Has RLE numbness and weakness.  Has had 2 falls. Fell out of shower and fell stepping in the door at work. Did not catch her toe. Fell forward. Hit her head on the wall. No LOC. No dizziness. Had had an spinal injection prior to this episode and was not in pain. Feels she can't walk straight and is getting worse. Unsteady gait.     Oct 2021 bought a new recliner and carried it into the house. Had shoulder and back pain. Has a torn rotator cuff. Doing PT for core strengthening for shoulder rehab.    Has chronic back pain in right buttock and radiates down right ant leg. Can feel like an ache or electric shock. RLE is weak and gives out. No change in bladder or bowels. Mild urinary incontinence.    MRI Lumbar Spine Without Contrast (10/24/2021 09:21)- severe spinal stenosis. Seeing Dr Herbert .     Abnormal MRI of Brain  PCP ordered MRI brain.  MRI Brain Without Contrast (04/10/2022 09:08)-IMPRESSION:     1. No acute intracranial abnormality. No acute infarct. No intracranial  mass/mass effect.  2. Patchy periventricular and subcortical white matter high T2/FLAIR  signal and patchy high T2/FLAIR signal in the vinicio, statistically  representing chronic small vessel ischemic changes.      Lifelong htn.    Has macular degeneration. Blurred vision. No blindness. No slurred speech, dysphagia. RLE with numbness and weakness from spinal stenosis. Mild incontinence. No bowel changes. No heat sensitivity. Denies MS bonilla      3 years ago fell down stairs in the dark and hit head on wall with hematoma. Arm  fracture.    PMH: known sciatica with epidural injections. Thoracic aortic aneurysm-monitored by PCP  Subjective      Past Medical History:   Past Medical History:   Diagnosis Date   • Aortic aneurysm (HCC)     abdominal   • Arthritis    • Asthma    • Environmental allergies     dust, pollen, trees, grass   • Fractures    • GERD (gastroesophageal reflux disease)    • Ground glass opacity present on imaging of lung    • H/O bone density study 2016    normal   • H/O pulmonary function tests 06/15/2017    normal   • Hepatic cyst    • Hepatic steatosis    • Hypertension    • Shingles     age 17-16   • Spinal stenosis    • BLAIR (stress urinary incontinence, female)    • UTI due to extended-spectrum beta lactamase (ESBL) producing Escherichia coli 2020    [place] [facility] [place] Surgical History:   Past Surgical History:   Procedure Laterality Date   •  SECTION  8, , , ,    • TRANSVAGINAL TAPING SUSPENSION WITH OBTURATOR  09/15/2009   • WRIST SURGERY         Family History:   Family History   Problem Relation Age of Onset   • Obesity Mother    • Lung cancer Father    • Lung cancer Sister    • Obesity Sister        Social History:   Social History     Socioeconomic History   • Marital status: Single   Tobacco Use   • Smoking status: Never Smoker   • Smokeless tobacco: Never Used   Vaping Use   • Vaping Use: Never used   Substance and Sexual Activity   • Alcohol use: Never   • Drug use: No   • Sexual activity: Defer       Medications:     Current Outpatient Medications:   •  albuterol (ACCUNEB) 0.63 MG/3ML nebulizer solution, Take 3 mL by nebulization Every 6 (Six) Hours As Needed for Wheezing., Disp: 24 vial, Rfl: 1  •  albuterol sulfate  (90 Base) MCG/ACT inhaler, Inhale 2 puffs Every 4 (Four) Hours As Needed for Wheezing., Disp: 18 g, Rfl: 6  •  amLODIPine (NORVASC) 5 MG tablet, Take 1 tablet by mouth Daily., Disp: 90 tablet, Rfl: 1  •  Fluticasone  Furoate-Vilanterol (BREO ELLIPTA) 200-25 MCG/INH inhaler, Inhale 1 puff Daily., Disp: 3 each, Rfl: 3  •  furosemide (LASIX) 40 MG tablet, Take 40 mg by mouth Daily., Disp: , Rfl:   •  lisinopril (PRINIVIL,ZESTRIL) 40 MG tablet, Take 1 tablet by mouth Every Night., Disp: 90 tablet, Rfl: 1  •  loratadine (CLARITIN) 10 MG tablet, Take 10 mg by mouth Daily., Disp: , Rfl:   •  montelukast (SINGULAIR) 10 MG tablet, Take 1 tablet by mouth Every Night., Disp: 90 tablet, Rfl: 3  •  multivitamin with minerals (Centrum Silver 50+Women) tablet tablet, Take 1 tablet by mouth Daily., Disp: , Rfl:   •  omeprazole (priLOSEC) 20 MG capsule, Take 1 capsule by mouth Daily., Disp: 90 capsule, Rfl: 3  •  oxybutynin XL (DITROPAN XL) 15 MG 24 hr tablet, oxybutynin chloride ER 15 mg tablet,extended release 24 hr  Take 1 tablet by mouth once daily, Disp: , Rfl:   •  potassium chloride (K-DUR,KLOR-CON) 20 MEQ CR tablet, Take 2 tablets by mouth Every Morning AND 1 tablet Every Night., Disp: 270 tablet, Rfl: 3  •  triamterene-hydrochlorothiazide (DYAZIDE) 37.5-25 MG per capsule, Take 1 capsule by mouth Every Night., Disp: 90 capsule, Rfl: 1    Allergies:   Allergies   Allergen Reactions   • Latex Shortness Of Breath       PHQ-9 Total Score:     STEIDALMIS Fall Risk Assessment was completed, and patient is at MODERATE risk for falls. Assessment completed on:5/9/2022    Objective     Physical Exam:   Physical Exam  Neurological:      Mental Status: She is oriented to person, place, and time.      Coordination: Heel to Shin Test abnormal.      Gait: Tandem walk abnormal.      Deep Tendon Reflexes:      Reflex Scores:       Tricep reflexes are 2+ on the right side and 2+ on the left side.       Bicep reflexes are 2+ on the right side and 2+ on the left side.       Brachioradialis reflexes are 2+ on the right side and 2+ on the left side.       Patellar reflexes are 2+ on the right side and 2+ on the left side.       Achilles reflexes are 2+ on the  "right side and 2+ on the left side.  Psychiatric:         Speech: Speech normal.         Neurologic Exam     Mental Status   Oriented to person, place, and time.   Follows 3 step commands.   Attention: normal. Concentration: normal.   Speech: speech is normal   Level of consciousness: alert  Knowledge: consistent with education.   Normal comprehension.     Cranial Nerves     CN III, IV, VI   Right pupil: Accommodation: intact.   Left pupil: Accommodation: intact.   CN III: no CN III palsy  CN VI: no CN VI palsy  Nystagmus: none   Diplopia: none  Upgaze: normal  Downgaze: normal  Conjugate gaze: present    CN VII   Facial expression full, symmetric.     CN VIII   Hearing: intact    Motor Exam   Muscle bulk: normal  Overall muscle tone: normal    Strength   Right biceps: 5/5  Left biceps: 5/5  Right triceps: 5/5  Left triceps: 5/5  Right interossei: 5/5  Left interossei: 5/5  Right quadriceps: 4/5  Left quadriceps: 4/5  Right anterior tibial: 4/5  Left anterior tibial: 4/5  Right posterior tibial: 4/5  Left posterior tibial: 4/5    Sensory Exam   Light touch normal.     Gait, Coordination, and Reflexes     Gait  Gait: non-neurologic    Coordination   Heel to shin coordination: abnormal  Tandem walking coordination: abnormal    Tremor   Resting tremor: absent  Action tremor: absent    Reflexes   Right brachioradialis: 2+  Left brachioradialis: 2+  Right biceps: 2+  Left biceps: 2+  Right triceps: 2+  Left triceps: 2+  Right patellar: 2+  Left patellar: 2+  Right achilles: 2+  Left achilles: 2+  Right : 2+  Left : 2+Side to side gait.        Vital Signs:   Vitals:    05/09/22 1408   BP: 138/86   Pulse: 89   Temp: 97.3 °F (36.3 °C)   SpO2: 97%   Weight: 106 kg (234 lb 6.4 oz)   Height: 160 cm (62.99\")     Body mass index is 41.53 kg/m².     Results:   Imaging:   XR Ankle 3+ View Right    Result Date: 1/31/2022  Generalized lower extremity subcutaneous edema. No evidence of acute or healing bony trauma.    This " report was finalized on 1/31/2022 11:02 AM by Dr. Tye Ordoñez MD.      MRI Brain Without Contrast    Result Date: 4/10/2022   1. No acute intracranial abnormality. No acute infarct. No intracranial mass/mass effect. 2. Patchy periventricular and subcortical white matter high T2/FLAIR signal and patchy high T2/FLAIR signal in the vinicio, statistically representing chronic small vessel ischemic changes.  This report was finalized on 4/10/2022 9:55 AM by Randy John MD.             Assessment / Plan      Assessment/Plan:   Diagnoses and all orders for this visit:    1. White matter disease (Primary)  Comments:  Likely due to age and lifelong htn    2. Primary hypertension  Comments:  BP goal 120/80      3. Spinal stenosis of lumbosacral region  Comments:  Keep appt with Dr Herbert.    Pt feels her symptoms of altered gait is due to spinal stenosis. Discussed proceeding with neuro work up but she wants to wait until after she see Dr Herbert.   Follow up here symptoms do not improve after treatment for spinal stenosis.  Patient Education:       Reviewed medications, potential side effects and signs and symptoms to report. Discussed risk versus benefits of treatment plan with patient and/or family-including medications, labs and radiology that may be ordered. Addressed questions and concerns during visit. Patient and/or family verbalized understanding and agree with plan. Instructed to call the office with any questions and report to ER with any life-threatening symptoms.     Follow Up:   Return if symptoms worsen or fail to improve.    During this visit the following were done:  Labs Reviewed [x]    Labs Ordered []    Radiology Reports Reviewed [x]    Radiology Ordered []    PCP Records Reviewed []    Referring Provider Records Reviewed []    ER Records Reviewed []    Hospital Records Reviewed []    History Obtained From Family []    Radiology Images Reviewed [x]    Other Reviewed [x]    Records Requested []       Michelle Bui, DNP, APRN

## 2022-05-12 ENCOUNTER — OFFICE VISIT (OUTPATIENT)
Dept: FAMILY MEDICINE CLINIC | Facility: CLINIC | Age: 82
End: 2022-05-12

## 2022-05-12 VITALS
RESPIRATION RATE: 18 BRPM | OXYGEN SATURATION: 94 % | SYSTOLIC BLOOD PRESSURE: 142 MMHG | HEART RATE: 62 BPM | TEMPERATURE: 98.2 F | WEIGHT: 233 LBS | HEIGHT: 63 IN | DIASTOLIC BLOOD PRESSURE: 90 MMHG | BODY MASS INDEX: 41.29 KG/M2

## 2022-05-12 DIAGNOSIS — I10 ESSENTIAL HYPERTENSION: Primary | Chronic | ICD-10-CM

## 2022-05-12 PROCEDURE — 99214 OFFICE O/P EST MOD 30 MIN: CPT | Performed by: FAMILY MEDICINE

## 2022-05-12 RX ORDER — TRIAMTERENE AND HYDROCHLOROTHIAZIDE 75; 50 MG/1; MG/1
1 TABLET ORAL DAILY
Qty: 90 TABLET | Refills: 1 | Status: SHIPPED | OUTPATIENT
Start: 2022-05-12 | End: 2022-10-14 | Stop reason: SDUPTHER

## 2022-05-12 NOTE — PROGRESS NOTES
"Chief Complaint  Hypertension (No new concerns)    Subjective          Mariela Long presents to [facility]  Hypertension  This is a chronic problem. The problem is uncontrolled. Current antihypertension treatment includes calcium channel blockers, ACE inhibitors and diuretics.     Leg swelling intermittently. Blood pressure monitoring sometimes outrageously high and other times normal. She is in a lot of pain today. She is seeing orthopedics 6/2/22.   She has lasix from her cardiologist to use as needed.         Objective   Vital Signs:  /90 (BP Location: Right arm, Patient Position: Sitting, Cuff Size: Large Adult)   Pulse 62   Temp 98.2 °F (36.8 °C) (Infrared)   Resp 18   Ht 160 cm (62.99\")   Wt 106 kg (233 lb)   SpO2 94%   BMI 41.28 kg/m²           Physical Exam  Vitals reviewed.   Constitutional:       General: She is not in acute distress.     Appearance: She is obese. She is not ill-appearing.   Cardiovascular:      Rate and Rhythm: Normal rate and regular rhythm.   Pulmonary:      Effort: Pulmonary effort is normal.      Breath sounds: Normal breath sounds.   Musculoskeletal:      Right lower leg: No edema.      Left lower leg: No edema.   Neurological:      Mental Status: She is alert.        Result Review :            Office Visit with Michelle Bui DNP, APRN (05/09/2022)       Assessment and Plan    Diagnoses and all orders for this visit:    1. Essential hypertension (Primary)  -     triamterene-hydrochlorothiazide (MAXZIDE) 75-50 MG per tablet; Take 1 tablet by mouth Daily.  Dispense: 90 tablet; Refill: 1    Uncontrolled.  At last appointment amlodipine dose was decreased from 10 mg to 5 mg.  She was also restarted on triamterene hydrochlorothiazide diuretic.  Swelling is improved on exam today but blood pressure remains elevated.  At this time increase triamterene hydrochlorothiazide dose.  Continue lisinopril 40 mg and amlodipine at 5 mg.  " Reassess next month or sooner if having side effects.         Follow Up   Return in about 1 month (around 6/12/2022) for Office visit HTN .  Patient was given instructions and counseling regarding her condition or for health maintenance advice. Please see specific information pulled into the AVS if appropriate.     Electronically signed by Brittany Ledezma MD, 05/12/22, 8:35 AM EDT.

## 2022-10-14 ENCOUNTER — LAB (OUTPATIENT)
Dept: LAB | Facility: HOSPITAL | Age: 82
End: 2022-10-14

## 2022-10-14 ENCOUNTER — OFFICE VISIT (OUTPATIENT)
Dept: FAMILY MEDICINE CLINIC | Facility: CLINIC | Age: 82
End: 2022-10-14

## 2022-10-14 VITALS
DIASTOLIC BLOOD PRESSURE: 86 MMHG | HEART RATE: 76 BPM | WEIGHT: 226.8 LBS | BODY MASS INDEX: 40.18 KG/M2 | HEIGHT: 63 IN | OXYGEN SATURATION: 96 % | SYSTOLIC BLOOD PRESSURE: 150 MMHG

## 2022-10-14 DIAGNOSIS — Z00.00 MEDICARE ANNUAL WELLNESS VISIT, SUBSEQUENT: Primary | ICD-10-CM

## 2022-10-14 DIAGNOSIS — M85.88 OTHER SPECIFIED DISORDERS OF BONE DENSITY AND STRUCTURE, OTHER SITE: ICD-10-CM

## 2022-10-14 DIAGNOSIS — Z23 IMMUNIZATION DUE: ICD-10-CM

## 2022-10-14 DIAGNOSIS — N39.0 ACUTE UTI: ICD-10-CM

## 2022-10-14 DIAGNOSIS — Z12.31 VISIT FOR SCREENING MAMMOGRAM: ICD-10-CM

## 2022-10-14 DIAGNOSIS — I10 ESSENTIAL HYPERTENSION: Chronic | ICD-10-CM

## 2022-10-14 DIAGNOSIS — Z23 NEED FOR VACCINATION: ICD-10-CM

## 2022-10-14 DIAGNOSIS — R73.03 PREDIABETES: Chronic | ICD-10-CM

## 2022-10-14 DIAGNOSIS — E66.01 MORBIDLY OBESE: Chronic | ICD-10-CM

## 2022-10-14 DIAGNOSIS — Z13.0 SCREENING FOR DEFICIENCY ANEMIA: ICD-10-CM

## 2022-10-14 PROBLEM — R05.9 COUGH: Status: RESOLVED | Noted: 2020-01-21 | Resolved: 2022-10-14

## 2022-10-14 LAB
ALBUMIN SERPL-MCNC: 4.3 G/DL (ref 3.5–5.2)
ALBUMIN/GLOB SERPL: 1.3 G/DL
ALP SERPL-CCNC: 84 U/L (ref 39–117)
ALT SERPL W P-5'-P-CCNC: 13 U/L (ref 1–33)
ANION GAP SERPL CALCULATED.3IONS-SCNC: 9 MMOL/L (ref 5–15)
AST SERPL-CCNC: 21 U/L (ref 1–32)
BILIRUB BLD-MCNC: NEGATIVE MG/DL
BILIRUB SERPL-MCNC: 0.4 MG/DL (ref 0–1.2)
BUN SERPL-MCNC: 17 MG/DL (ref 8–23)
BUN/CREAT SERPL: 25.8 (ref 7–25)
CALCIUM SPEC-SCNC: 9.3 MG/DL (ref 8.6–10.5)
CHLORIDE SERPL-SCNC: 105 MMOL/L (ref 98–107)
CHOLEST SERPL-MCNC: 180 MG/DL (ref 0–200)
CLARITY, POC: ABNORMAL
CO2 SERPL-SCNC: 28 MMOL/L (ref 22–29)
COLOR UR: YELLOW
CREAT SERPL-MCNC: 0.66 MG/DL (ref 0.57–1)
DEPRECATED RDW RBC AUTO: 44.5 FL (ref 37–54)
EGFRCR SERPLBLD CKD-EPI 2021: 89.4 ML/MIN/1.73
ERYTHROCYTE [DISTWIDTH] IN BLOOD BY AUTOMATED COUNT: 13.5 % (ref 12.3–15.4)
EXPIRATION DATE: ABNORMAL
GLOBULIN UR ELPH-MCNC: 3.2 GM/DL
GLUCOSE SERPL-MCNC: 98 MG/DL (ref 65–99)
GLUCOSE UR STRIP-MCNC: NEGATIVE MG/DL
HCT VFR BLD AUTO: 37.6 % (ref 34–46.6)
HDLC SERPL-MCNC: 56 MG/DL (ref 40–60)
HGB BLD-MCNC: 12.2 G/DL (ref 12–15.9)
KETONES UR QL: NEGATIVE
LDLC SERPL CALC-MCNC: 106 MG/DL (ref 0–100)
LDLC/HDLC SERPL: 1.87 {RATIO}
LEUKOCYTE EST, POC: ABNORMAL
Lab: ABNORMAL
MCH RBC QN AUTO: 29.3 PG (ref 26.6–33)
MCHC RBC AUTO-ENTMCNC: 32.4 G/DL (ref 31.5–35.7)
MCV RBC AUTO: 90.4 FL (ref 79–97)
NITRITE UR-MCNC: POSITIVE MG/ML
PH UR: 6 [PH] (ref 5–8)
PLATELET # BLD AUTO: 283 10*3/MM3 (ref 140–450)
PMV BLD AUTO: 12 FL (ref 6–12)
POTASSIUM SERPL-SCNC: 3.5 MMOL/L (ref 3.5–5.2)
PROT SERPL-MCNC: 7.5 G/DL (ref 6–8.5)
PROT UR STRIP-MCNC: ABNORMAL MG/DL
RBC # BLD AUTO: 4.16 10*6/MM3 (ref 3.77–5.28)
RBC # UR STRIP: ABNORMAL /UL
SODIUM SERPL-SCNC: 142 MMOL/L (ref 136–145)
SP GR UR: 1.01 (ref 1–1.03)
TRIGL SERPL-MCNC: 97 MG/DL (ref 0–150)
TSH SERPL DL<=0.05 MIU/L-ACNC: 2.38 UIU/ML (ref 0.27–4.2)
UROBILINOGEN UR QL: NORMAL
VLDLC SERPL-MCNC: 18 MG/DL (ref 5–40)
WBC NRBC COR # BLD: 6.09 10*3/MM3 (ref 3.4–10.8)

## 2022-10-14 PROCEDURE — 87186 SC STD MICRODIL/AGAR DIL: CPT | Performed by: FAMILY MEDICINE

## 2022-10-14 PROCEDURE — 87077 CULTURE AEROBIC IDENTIFY: CPT | Performed by: FAMILY MEDICINE

## 2022-10-14 PROCEDURE — 1159F MED LIST DOCD IN RCRD: CPT | Performed by: FAMILY MEDICINE

## 2022-10-14 PROCEDURE — 81003 URINALYSIS AUTO W/O SCOPE: CPT | Performed by: FAMILY MEDICINE

## 2022-10-14 PROCEDURE — 90662 IIV NO PRSV INCREASED AG IM: CPT | Performed by: FAMILY MEDICINE

## 2022-10-14 PROCEDURE — 1170F FXNL STATUS ASSESSED: CPT | Performed by: FAMILY MEDICINE

## 2022-10-14 PROCEDURE — G0008 ADMIN INFLUENZA VIRUS VAC: HCPCS | Performed by: FAMILY MEDICINE

## 2022-10-14 PROCEDURE — 80061 LIPID PANEL: CPT

## 2022-10-14 PROCEDURE — 83036 HEMOGLOBIN GLYCOSYLATED A1C: CPT

## 2022-10-14 PROCEDURE — 1126F AMNT PAIN NOTED NONE PRSNT: CPT | Performed by: FAMILY MEDICINE

## 2022-10-14 PROCEDURE — 99214 OFFICE O/P EST MOD 30 MIN: CPT | Performed by: FAMILY MEDICINE

## 2022-10-14 PROCEDURE — G0439 PPPS, SUBSEQ VISIT: HCPCS | Performed by: FAMILY MEDICINE

## 2022-10-14 PROCEDURE — 80053 COMPREHEN METABOLIC PANEL: CPT

## 2022-10-14 PROCEDURE — 85027 COMPLETE CBC AUTOMATED: CPT

## 2022-10-14 PROCEDURE — 87086 URINE CULTURE/COLONY COUNT: CPT | Performed by: FAMILY MEDICINE

## 2022-10-14 PROCEDURE — 84443 ASSAY THYROID STIM HORMONE: CPT

## 2022-10-14 RX ORDER — NITROFURANTOIN 25; 75 MG/1; MG/1
100 CAPSULE ORAL EVERY 12 HOURS SCHEDULED
Qty: 10 CAPSULE | Refills: 0 | Status: SHIPPED | OUTPATIENT
Start: 2022-10-14 | End: 2022-10-19

## 2022-10-14 RX ORDER — SPIRONOLACTONE 25 MG/1
25 TABLET ORAL DAILY
Qty: 30 TABLET | Refills: 1 | Status: SHIPPED | OUTPATIENT
Start: 2022-10-14 | End: 2022-11-14 | Stop reason: SDUPTHER

## 2022-10-14 RX ORDER — LISINOPRIL 40 MG/1
40 TABLET ORAL NIGHTLY
Qty: 90 TABLET | Refills: 1 | Status: SHIPPED | OUTPATIENT
Start: 2022-10-14

## 2022-10-14 RX ORDER — TRIAMTERENE AND HYDROCHLOROTHIAZIDE 75; 50 MG/1; MG/1
1 TABLET ORAL DAILY
Qty: 90 TABLET | Refills: 1 | Status: SHIPPED | OUTPATIENT
Start: 2022-10-14

## 2022-10-14 RX ORDER — AMLODIPINE BESYLATE 5 MG/1
5 TABLET ORAL DAILY
Qty: 90 TABLET | Refills: 1 | Status: SHIPPED | OUTPATIENT
Start: 2022-10-14

## 2022-10-14 NOTE — PROGRESS NOTES
The ABCs of the Annual Wellness Visit  Subsequent Medicare Wellness Visit    Chief Complaint   Patient presents with   • Medicare Wellness-subsequent   • Urinary Frequency      Subjective    History of Present Illness:  Mariela Long is a 81 y.o. female who presents for a Subsequent Medicare Wellness Visit.    1-2 weeks of urinary symptoms. Urine odor and cloudy. She has usual urinary frequency.     She overslept and rushed to the appointment this morning. Home blood pressure readings sometimes normal or high. 140s-150s/80s.     She has been skipping meals and having less of an appetite.      The following portions of the patient's history were reviewed and   updated as appropriate: allergies, current medications, past family history, past medical history, past social history, past surgical history and problem list.    Compared to one year ago, the patient feels her physical   health is better.    Compared to one year ago, the patient feels her mental   health is the same.    Recent Hospitalizations:  She was admitted within the past 365 days at [facility].         Current Medical Providers:  Patient Care Team:  Brittany Allen MD as PCP - General (Family Medicine)  Cristi Montana MD as Consulting Physician (Urology)  Carine Mathur DO as Consulting Physician (Pulmonary Disease)  Lalo Fish MD as Consulting Physician (Infectious Diseases)  Darren Oconnor MD as Consulting Physician (Cardiology)    Outpatient Medications Prior to Visit   Medication Sig Dispense Refill   • albuterol (ACCUNEB) 0.63 MG/3ML nebulizer solution Take 3 mL by nebulization Every 6 (Six) Hours As Needed for Wheezing. 24 vial 1   • albuterol sulfate  (90 Base) MCG/ACT inhaler Inhale 2 puffs Every 4 (Four) Hours As Needed for Wheezing. 18 g 6   • Fluticasone Furoate-Vilanterol (BREO ELLIPTA) 200-25 MCG/INH inhaler Inhale 1 puff Daily. 3 each 3   • furosemide (LASIX) 40 MG tablet Take 40 mg  by mouth Daily.     • loratadine (CLARITIN) 10 MG tablet Take 10 mg by mouth Daily.     • montelukast (SINGULAIR) 10 MG tablet Take 1 tablet by mouth Every Night. 90 tablet 3   • multivitamin with minerals tablet tablet Take 1 tablet by mouth Daily.     • omeprazole (priLOSEC) 20 MG capsule Take 1 capsule by mouth Daily. 90 capsule 3   • oxybutynin XL (DITROPAN XL) 15 MG 24 hr tablet oxybutynin chloride ER 15 mg tablet,extended release 24 hr   Take 1 tablet by mouth once daily     • potassium chloride (K-DUR,KLOR-CON) 20 MEQ CR tablet Take 2 tablets by mouth Every Morning AND 1 tablet Every Night. 270 tablet 3   • amLODIPine (NORVASC) 5 MG tablet Take 1 tablet by mouth Daily. 90 tablet 1   • lisinopril (PRINIVIL,ZESTRIL) 40 MG tablet Take 1 tablet by mouth Every Night. 90 tablet 1   • triamterene-hydrochlorothiazide (MAXZIDE) 75-50 MG per tablet Take 1 tablet by mouth Daily. 90 tablet 1     No facility-administered medications prior to visit.       No opioid medication identified on active medication list. I have reviewed chart for other potential  high risk medication/s and harmful drug interactions in the elderly.          Aspirin is not on active medication list.  Aspirin use is not indicated based on review of current medical condition/s. Risk of harm outweighs potential benefits.  .    Patient Active Problem List   Diagnosis   • Environmental allergies   • Essential hypertension   • Hypokalemia   • Gastroesophageal reflux disease   • Ground glass opacity present on imaging of lung   • Asthma   • Morbidly obese (HCC)   • Prediabetes   • Seasonal allergic rhinitis   • Urge incontinence of urine   • Aortic aneurysm (HCC)   • Peripheral edema   • Frequent falls   • Muscle cramping     Advance Care Planning  Advance Directive is not on file.  ACP discussion was held with the patient during this visit. Patient has an advance directive (not in EMR), copy requested.          Objective    Vitals:    10/14/22 1015   BP:  "150/86   Pulse: 76   SpO2: 96%   Weight: 103 kg (226 lb 12.8 oz)   Height: 160 cm (62.99\")   PainSc: 0-No pain     Estimated body mass index is 40.19 kg/m² as calculated from the following:    Height as of this encounter: 160 cm (62.99\").    Weight as of this encounter: 103 kg (226 lb 12.8 oz).    Class 3 Severe Obesity (BMI >=40). Obesity-related health conditions include the following: hypertension, GERD, lower extremity venous stasis disease and prediabetes. Obesity is improving with lifestyle modifications. BMI is is above average; BMI management plan is completed. We discussed Mediterranean diet handout.      Does the patient have evidence of cognitive impairment? No    Physical Exam  Constitutional:       General: She is not in acute distress.     Appearance: She is morbidly obese.   HENT:      Right Ear: Tympanic membrane and ear canal normal.      Left Ear: Tympanic membrane and ear canal normal.   Eyes:      General:         Right eye: No discharge.         Left eye: No discharge.      Conjunctiva/sclera: Conjunctivae normal.   Neck:      Thyroid: No thyromegaly.   Cardiovascular:      Rate and Rhythm: Normal rate and regular rhythm.   Pulmonary:      Effort: Pulmonary effort is normal.      Breath sounds: Normal breath sounds.   Abdominal:      Palpations: Abdomen is soft. There is no hepatomegaly.      Tenderness: There is no abdominal tenderness.   Genitourinary:     Comments: No suprapubic tenderness  Musculoskeletal:      Cervical back: Neck supple.      Right lower leg: Edema ( trace) present.      Left lower leg: Edema ( trace) present.   Lymphadenopathy:      Head:      Right side of head: No submandibular, preauricular or posterior auricular adenopathy.      Left side of head: No submandibular, preauricular or posterior auricular adenopathy.      Cervical: No cervical adenopathy.   Skin:     General: Skin is warm.   Neurological:      Mental Status: She is alert and oriented to person, place, and " time.   Psychiatric:         Mood and Affect: Mood normal.         Behavior: Behavior normal.         Thought Content: Thought content normal.         Judgment: Judgment normal.                 HEALTH RISK ASSESSMENT    Smoking Status:  Social History     Tobacco Use   Smoking Status Never   Smokeless Tobacco Never     Alcohol Consumption:  Social History     Substance and Sexual Activity   Alcohol Use Never     Fall Risk Screen:    [facility] Fall Risk Assessment was completed, and patient is at LOW risk for falls.Assessment completed on:10/14/2022   [facility] Fall Risk Clinician Key Questions   Have you fallen in the past year?: No  Do you feel unsteady with walking?: No  Are you worried about falling?: No    Stay Idependant Patient Questions   Have you been advised to use a cane or a walker to get around safely?: Yes  Do you steady yourself by holding onto furniture when walking at home?: Yes  Do you need to push with your hands to stand up from a chair?: No  Do you have trouble stepping up onto a curb?: No  Do you have to rush to the toilet?: No  Have you lost some feeling in your feet?: No  Do you take medicine that sometimes makes you feel light headed or more tired than usual?: No  Do you take medicine to help you sleep or improve your mood?: No  Do you often feel sad or depressed?: No  Patient Fall Risk Assessment Score : 2        Depression Screening:  PHQ-2/PHQ-9 Depression Screening 10/14/2022   Retired PHQ-9 Total Score -   Retired Total Score -   Little Interest or Pleasure in Doing Things 0-->not at all   Feeling Down, Depressed or Hopeless 0-->not at all   PHQ-9: Brief Depression Severity Measure Score 0       Health Habits and Functional and Cognitive Screening:  Functional & Cognitive Status 10/14/2022   Do you have difficulty preparing food and eating? No   Do you have difficulty bathing yourself, getting dressed or grooming yourself? No   Do you have difficulty using the toilet? No   Do you have  difficulty moving around from place to place? No   Do you have trouble with steps or getting out of a bed or a chair? Yes   Current Diet Well Balanced Diet   Dental Exam Up to date   Eye Exam Up to date   Exercise (times per week) 4 times per week   Current Exercises Include Walking   Current Exercise Activities Include -   Do you need help using the phone?  No   Are you deaf or do you have serious difficulty hearing?  No   Do you need help with transportation? No   Do you need help shopping? No   Do you need help preparing meals?  No   Do you need help with housework?  No   Do you need help with laundry? No   Do you need help taking your medications? No   Do you need help managing money? No   Do you ever drive or ride in a car without wearing a seat belt? No   Have you felt unusual stress, anger or loneliness in the last month? No   Who do you live with? Alone   If you need help, do you have trouble finding someone available to you? No   Have you been bothered in the last four weeks by sexual problems? No   Do you have difficulty concentrating, remembering or making decisions? No       Age-appropriate Screening Schedule:  Refer to the list below for future screening recommendations based on patient's age, sex and/or medical conditions. Orders for these recommended tests are listed in the plan section. The patient has been provided with a written plan.    Health Maintenance   Topic Date Due   • ZOSTER VACCINE (2 of 3) 01/24/2010   • DXA SCAN  04/26/2018   • TDAP/TD VACCINES (2 - Td or Tdap) 12/16/2021   • HEMOGLOBIN A1C  04/06/2022   • INFLUENZA VACCINE  Completed              Immunization History   Administered Date(s) Administered   • COVID-19 (PFIZER) PURPLE CAP 02/03/2021, 02/25/2021, 09/30/2021   • Fluzone High Dose =>65 Years ([employer] ONLY) 10/26/2016, 10/19/2017, 11/15/2017   • Fluzone High-Dose 65+yrs 09/30/2021, 10/14/2022   • Hepatitis A 03/11/2019, 10/03/2019   • Influenza, Unspecified 09/01/2021   •  Pneumococcal Conjugate 13-Valent (PCV13) 04/19/2017   • Pneumococcal Polysaccharide (PPSV23) 12/16/2010, 11/15/2014   • Pneumococcal, Unspecified 03/16/2000   • Tdap 12/16/2011   • Zostavax 11/29/2009     Results for orders placed or performed in visit on 10/14/22   POC Urinalysis Dipstick, Automated    Specimen: Urine   Result Value Ref Range    Color Yellow Yellow, Straw, Dark Yellow, Rhonda    Clarity, UA Cloudy (A) Clear    Specific Gravity  1.015 1.005 - 1.030    pH, Urine 6.0 5.0 - 8.0    Leukocytes Small (1+) (A) Negative    Nitrite, UA Positive (A) Negative    Protein, POC Trace (A) Negative mg/dL    Glucose, UA Negative Negative mg/dL    Ketones, UA Negative Negative    Urobilinogen, UA Normal Normal, 0.2 E.U./dL    Bilirubin Negative Negative    Blood, UA 1+ (A) Negative    Lot Number [id number]     Expiration Date 10/21/2023          Assessment & Plan   CMS Preventative Services Quick Reference  Risk Factors Identified During Encounter  Cardiovascular Disease  Immunizations Discussed/Encouraged (specific Immunizations; Influenza  Obesity/Overweight   Polypharmacy  The above risks/problems have been discussed with the patient.  Follow up actions/plans if indicated are seen below in the Assessment/Plan Section.  Pertinent information has been shared with the patient in the After Visit Summary.    Diagnoses and all orders for this visit:    1. Medicare annual wellness visit, subsequent (Primary)  Counseling/anticipatory guidance: Nutrition, immunizations, screenings  She is agreeable to breast cancer screening mammogram.  She is overdue for follow-up bone density last performed in 2016.  Check fasting labs today.  2. Essential hypertension  -     Comprehensive Metabolic Panel; Future  -     TSH Rfx On Abnormal To Free T4; Future  -     Lipid Panel; Future  -     spironolactone (Aldactone) 25 MG tablet; Take 1 tablet by mouth Daily.  Dispense: 30 tablet; Refill: 1  -     triamterene-hydrochlorothiazide  (MAXZIDE) 75-50 MG per tablet; Take 1 tablet by mouth Daily.  Dispense: 90 tablet; Refill: 1  -     lisinopril (PRINIVIL,ZESTRIL) 40 MG tablet; Take 1 tablet by mouth Every Night.  Dispense: 90 tablet; Refill: 1  -     amLODIPine (NORVASC) 5 MG tablet; Take 1 tablet by mouth Daily.  Dispense: 90 tablet; Refill: 1  Uncontrolled.  She was unable to tolerate higher doses of amlodipine due to leg swelling.  At this time add spironolactone.  I would like her to have baseline labs today and then repeat labs in 1 week.  Continue on triamterene hydrochlorothiazide, lisinopril, and amlodipine.  If she has intolerance to spironolactone may then consider using a beta-blocker.  Reassess blood pressure control in 1 month.  3. Morbidly obese (HCC)  Patient has had decreased appetite and skipping meals.  Recommend overall Mediterranean diet.  Reassess weight loss at next visit in 1 month.  4. Immunization due    5. Prediabetes  -     Hemoglobin A1c; Future  Follow-up labs.  6. Screening for deficiency anemia  -     CBC (No Diff); Future    7. Acute UTI  -     POC Urinalysis Dipstick, Automated  -     nitrofurantoin, macrocrystal-monohydrate, (MACROBID) 100 MG capsule; Take 1 capsule by mouth Every 12 (Twelve) Hours for 5 days.  Dispense: 10 capsule; Refill: 0  -     Urine Culture - Urine, Urine, Clean Catch; Future  New.  Treat with Macrobid.  Discussed with patient if she has UTI symptoms to seek care promptly and not delay for 1 to 2 weeks.  8. Need for vaccination  -     Fluzone High-Dose 65+yrs    9. Other specified disorders of bone density and structure, other site  -     DEXA Bone Density Axial; Future  Check follow-up bone density.  10. Visit for screening mammogram  -     Mammo Screening Digital Tomosynthesis Bilateral With CAD; Future        Follow Up:   Return in about 1 month (around 11/14/2022) for Office visit HTN AND , MWV and fasting labs 1 year.     An After Visit Summary and PPPS were made available to the  patient.                Electronically signed by Brittany Ledezma MD, 10/14/22, 11:56 AM EDT.

## 2022-10-14 NOTE — PATIENT INSTRUCTIONS
Advance Care Planning and Advance Directives     You make decisions on a daily basis - decisions about where you want to live, your career, your home, your life. Perhaps one of the most important decisions you face is your choice for future medical care. Take time to talk with your family and your healthcare team and start planning today.  Advance Care Planning is a process that can help you:  Understand possible future healthcare decisions in light of your own experiences  Reflect on those decision in light of your goals and values  Discuss your decisions with those closest to you and the healthcare professionals that care for you  Make a plan by creating a document that reflects your wishes    Surrogate Decision Maker  In the event of a medical emergency, which has left you unable to communicate or to make your own decisions, you would need someone to make decisions for you.  It is important to discuss your preferences for medical treatment with this person while you are in good health.     Qualities of a surrogate decision maker:  Willing to take on this role and responsibility  Knows what you want for future medical care  Willing to follow your wishes even if they don't agree with them  Able to make difficult medical decisions under stressful circumstances    Advance Directives  These are legal documents you can create that will guide your healthcare team and decision maker(s) when needed. These documents can be stored in the electronic medical record.    Living Will - a legal document to guide your care if you have a terminal condition or a serious illness and are unable to communicate. States vary by statute in document names/types, but most forms may include one or more of the following:        -  Directions regarding life-prolonging treatments        -  Directions regarding artificially provided nutrition/hydration        -  Choosing a healthcare decision maker        -  Direction regarding organ/tissue  donation    Durable Power of  for Healthcare - this document names an -in-fact to make medical decisions for you, but it may also allow this person to make personal and financial decisions for you. Please seek the advice of an  if you need this type of document.    **Advance Directives are not required and no one may discriminate against you if you do not sign one.    Medical Orders  Many states allow specific forms/orders signed by your physician to record your wishes for medical treatment in your current state of health. This form, signed in personal communication with your physician, addresses resuscitation and other medical interventions that you may or may not want.      For more information or to schedule a time with a [facility] Advance Care Planning Facilitator contact: [facility] or call [phone] and someone will contact you directly.  You are due for adacel Tdap vaccination. (provides protection against tetanus, diptheria and whooping cough) Please  get the immunization at your local pharmacy at your earliest convenience. This immunization will next be due in 10 years. Please click on the link for more information about this vaccine.    [facility] Tdap Vaccine Information    You are due for Shingrix vaccination series ( the newest shingles vaccine).  It is a two shot series spaced 2-6 months apart. Please get this vaccine series started at your earliest convenience at your local pharmacy to help avoid shingles outbreak. It is more effective than the old Zostavax vaccine and is recommended even if you have had the Zostavax vaccine in the past.  Once the Shingrix series is completed, it does not need to be repeated.   For more information, please look at the website below:  [facility] Shingrix Vaccine Information      Medicare Wellness  Personal Prevention Plan of Service     Date of Office Visit:    Encounter Provider:  Brittany Ledezma MD  Place of Service:  [facility]  [facility]  Patient Name: Mariela Long  :  1940    As part of the Medicare Wellness portion of your visit today, we are providing you with this personalized preventive plan of services (PPPS). This plan is based upon recommendations of the [place] Preventive Services Task Force (USPSTF) and the Advisory Committee on Immunization Practices (ACIP).    This lists the preventive care services that should be considered, and provides dates of when you are due. Items listed as completed are up-to-date and do not require any further intervention.    Health Maintenance   Topic Date Due    ZOSTER VACCINE (2 of 3) 2010    DXA SCAN  2018    HEPATITIS C SCREENING  Never done    COVID-19 Vaccine (4 - Booster for Pfizer series) 2021    TDAP/TD VACCINES (2 - Td or Tdap) 2021    HEMOGLOBIN A1C  2022    INFLUENZA VACCINE  2022    ANNUAL WELLNESS VISIT  10/14/2023    Pneumococcal Vaccine 65+  Completed       Orders Placed This Encounter   Procedures    Urine Culture - Urine, Urine, Clean Catch     Standing Status:   Future     Standing Expiration Date:   10/14/2023    DEXA Bone Density Axial     Standing Status:   Future     Standing Expiration Date:   10/14/2023     Order Specific Question:   Reason for Exam:     Answer:   screen    Mammo Screening Digital Tomosynthesis Bilateral With CAD     Standing Status:   Future     Standing Expiration Date:   10/14/2023     Order Specific Question:   Reason for Exam:     Answer:   screen    Fluzone High-Dose 65+yrs    CBC (No Diff)     Standing Status:   Future     Standing Expiration Date:   10/14/2023     Order Specific Question:   Release to patient     Answer:   Routine Release    Comprehensive Metabolic Panel     Standing Status:   Future     Standing Expiration Date:   10/14/2023     Order Specific Question:   Release to patient     Answer:   Routine Release    TSH Rfx On Abnormal To Free T4     Standing  Status:   Future     Standing Expiration Date:   10/14/2023     Order Specific Question:   Release to patient     Answer:   Routine Release    Lipid Panel     Standing Status:   Future     Standing Expiration Date:   10/14/2023    Hemoglobin A1c     Standing Status:   Future     Standing Expiration Date:   10/14/2023    POC Urinalysis Dipstick, Automated       Return in about 1 month (around 11/14/2022) for Office visit HTN AND , MWV and fasting labs 1 year.

## 2022-10-15 LAB — HBA1C MFR BLD: 5.9 % (ref 4.8–5.6)

## 2022-10-19 ENCOUNTER — TELEPHONE (OUTPATIENT)
Dept: FAMILY MEDICINE CLINIC | Facility: CLINIC | Age: 82
End: 2022-10-19

## 2022-10-19 DIAGNOSIS — N39.0 ACUTE UTI: Primary | ICD-10-CM

## 2022-10-19 LAB — BACTERIA SPEC AEROBE CULT: ABNORMAL

## 2022-10-19 RX ORDER — SULFAMETHOXAZOLE AND TRIMETHOPRIM 800; 160 MG/1; MG/1
1 TABLET ORAL 2 TIMES DAILY
Qty: 6 TABLET | Refills: 0 | Status: SHIPPED | OUTPATIENT
Start: 2022-10-19 | End: 2022-10-22

## 2022-10-19 NOTE — TELEPHONE ENCOUNTER
Attempted to contact, no answer. Left detailed voicemail relaying provider's message     Urine infection with Klebsiella pneumonia is resistant to Macrobid antibiotic.  I have sent in a replacement antibiotic of Bactrim to your pharmacy to take for 3 days.  I recommend returning to the lab after you have completed antibiotics and waited 1 week to make sure that the infection has cleared.  Hub can relay and document.

## 2022-10-19 NOTE — TELEPHONE ENCOUNTER
Urine infection with Klebsiella pneumonia is resistant to Macrobid antibiotic.  I have sent in a replacement antibiotic of Bactrim to your pharmacy to take for 3 days.  I recommend returning to the lab after you have completed antibiotics and waited 1 week to make sure that the infection has cleared.

## 2022-10-26 ENCOUNTER — LAB (OUTPATIENT)
Dept: LAB | Facility: HOSPITAL | Age: 82
End: 2022-10-26

## 2022-10-26 DIAGNOSIS — N39.0 ACUTE UTI: ICD-10-CM

## 2022-10-26 DIAGNOSIS — I10 ESSENTIAL HYPERTENSION: Chronic | ICD-10-CM

## 2022-10-26 LAB
ANION GAP SERPL CALCULATED.3IONS-SCNC: 8.9 MMOL/L (ref 5–15)
BUN SERPL-MCNC: 14 MG/DL (ref 8–23)
BUN/CREAT SERPL: 18.9 (ref 7–25)
CALCIUM SPEC-SCNC: 9.1 MG/DL (ref 8.6–10.5)
CHLORIDE SERPL-SCNC: 102 MMOL/L (ref 98–107)
CO2 SERPL-SCNC: 27.1 MMOL/L (ref 22–29)
CREAT SERPL-MCNC: 0.74 MG/DL (ref 0.57–1)
EGFRCR SERPLBLD CKD-EPI 2021: 81.9 ML/MIN/1.73
GLUCOSE SERPL-MCNC: 83 MG/DL (ref 65–99)
POTASSIUM SERPL-SCNC: 4.5 MMOL/L (ref 3.5–5.2)
SODIUM SERPL-SCNC: 138 MMOL/L (ref 136–145)

## 2022-10-26 PROCEDURE — 80048 BASIC METABOLIC PNL TOTAL CA: CPT

## 2022-10-26 PROCEDURE — 87086 URINE CULTURE/COLONY COUNT: CPT

## 2022-10-27 LAB — BACTERIA SPEC AEROBE CULT: NORMAL

## 2022-11-14 ENCOUNTER — LAB (OUTPATIENT)
Dept: LAB | Facility: HOSPITAL | Age: 82
End: 2022-11-14

## 2022-11-14 ENCOUNTER — OFFICE VISIT (OUTPATIENT)
Dept: FAMILY MEDICINE CLINIC | Facility: CLINIC | Age: 82
End: 2022-11-14

## 2022-11-14 VITALS
HEART RATE: 72 BPM | SYSTOLIC BLOOD PRESSURE: 130 MMHG | OXYGEN SATURATION: 97 % | BODY MASS INDEX: 40.89 KG/M2 | DIASTOLIC BLOOD PRESSURE: 81 MMHG | HEIGHT: 63 IN | WEIGHT: 230.8 LBS

## 2022-11-14 DIAGNOSIS — K21.9 GASTROESOPHAGEAL REFLUX DISEASE, UNSPECIFIED WHETHER ESOPHAGITIS PRESENT: ICD-10-CM

## 2022-11-14 DIAGNOSIS — R60.9 PERIPHERAL EDEMA: Chronic | ICD-10-CM

## 2022-11-14 DIAGNOSIS — I71.41 PARARENAL ABDOMINAL AORTIC ANEURYSM (AAA) WITHOUT RUPTURE: ICD-10-CM

## 2022-11-14 DIAGNOSIS — I10 ESSENTIAL HYPERTENSION: Chronic | ICD-10-CM

## 2022-11-14 DIAGNOSIS — I10 ESSENTIAL HYPERTENSION: Primary | Chronic | ICD-10-CM

## 2022-11-14 DIAGNOSIS — E87.6 HYPOKALEMIA: ICD-10-CM

## 2022-11-14 LAB
ANION GAP SERPL CALCULATED.3IONS-SCNC: 12 MMOL/L (ref 5–15)
BUN SERPL-MCNC: 16 MG/DL (ref 8–23)
BUN/CREAT SERPL: 21.6 (ref 7–25)
CALCIUM SPEC-SCNC: 9.5 MG/DL (ref 8.6–10.5)
CHLORIDE SERPL-SCNC: 105 MMOL/L (ref 98–107)
CO2 SERPL-SCNC: 26 MMOL/L (ref 22–29)
CREAT SERPL-MCNC: 0.74 MG/DL (ref 0.57–1)
EGFRCR SERPLBLD CKD-EPI 2021: 81.9 ML/MIN/1.73
GLUCOSE SERPL-MCNC: 91 MG/DL (ref 65–99)
POTASSIUM SERPL-SCNC: 4 MMOL/L (ref 3.5–5.2)
SODIUM SERPL-SCNC: 143 MMOL/L (ref 136–145)

## 2022-11-14 PROCEDURE — 99214 OFFICE O/P EST MOD 30 MIN: CPT | Performed by: FAMILY MEDICINE

## 2022-11-14 PROCEDURE — 80048 BASIC METABOLIC PNL TOTAL CA: CPT

## 2022-11-14 RX ORDER — SPIRONOLACTONE 25 MG/1
25 TABLET ORAL DAILY
Qty: 90 TABLET | Refills: 1 | Status: SHIPPED | OUTPATIENT
Start: 2022-11-14

## 2022-11-14 RX ORDER — POTASSIUM CHLORIDE 20 MEQ/1
20 TABLET, EXTENDED RELEASE ORAL EVERY MORNING
Qty: 90 TABLET | Refills: 3
Start: 2022-11-14

## 2022-11-14 RX ORDER — OMEPRAZOLE 20 MG/1
20 CAPSULE, DELAYED RELEASE ORAL DAILY
Qty: 90 CAPSULE | Refills: 3 | Status: SHIPPED | OUTPATIENT
Start: 2022-11-14

## 2022-11-14 NOTE — PROGRESS NOTES
"Chief Complaint  Hypertension    Subjective        Mariela Long presents to [facility]  Hypertension  This is a chronic problem. The problem is uncontrolled. Current antihypertension treatment includes diuretics, ACE inhibitors and calcium channel blockers.       Home blood pressure 140-150/80s.     She has a little increased urination. Lasix as needed. When she's working she retains fluid and legs swell sitting at desk.   She is retiring next week.     She has been taking PPI for acid reflux.     Cardiologist increased potassium. She currently uses OTC potassium.     Objective   Vital Signs:  /81   Pulse 72   Ht 160 cm (62.99\")   Wt 105 kg (230 lb 12.8 oz)   SpO2 97%   BMI 40.89 kg/m²   Estimated body mass index is 40.89 kg/m² as calculated from the following:    Height as of this encounter: 160 cm (62.99\").    Weight as of this encounter: 105 kg (230 lb 12.8 oz).     Vitals:    11/14/22 0815 11/14/22 0845   BP: 140/84 130/81   Pulse: 72    SpO2: 97%    Weight: 105 kg (230 lb 12.8 oz)    Height: 160 cm (62.99\")                Physical Exam  Vitals reviewed.   Constitutional:       General: She is not in acute distress.     Appearance: She is obese. She is not ill-appearing.   Cardiovascular:      Rate and Rhythm: Normal rate and regular rhythm.   Pulmonary:      Effort: Pulmonary effort is normal.      Breath sounds: Normal breath sounds.   Musculoskeletal:      Right lower leg: No edema.      Left lower leg: No edema.   Neurological:      Mental Status: She is alert.      Gait: Gait normal.   Psychiatric:         Mood and Affect: Mood normal.        Result Review :  The following data was reviewed by: Brittany Cantor MD on 11/14/2022:  Common labs    Common Labs 3/30/22 3/30/22 10/14/22 10/14/22 10/14/22 10/14/22 10/26/22    1044 1044 1051 1051 1051 1051    Glucose  111 (A)  98   83   BUN  21  17   14   Creatinine  0.94  0.66   0.74   Sodium  143  142   138 "   Potassium  4.8  3.5   4.5   Chloride  104  105   102   Calcium  9.7  9.3   9.1   Albumin  4.50  4.30      Total Bilirubin  0.3  0.4      Alkaline Phosphatase  103  84      AST (SGOT)  23  21      ALT (SGPT)  18  13      WBC 7.11  6.09       Hemoglobin 11.9 (A)  12.2       Hematocrit 37.7  37.6       Platelets 253  283       Total Cholesterol     180     Triglycerides     97     HDL Cholesterol     56     LDL Cholesterol      106 (A)     Hemoglobin A1C      5.90 (A)    (A) Abnormal value                      Assessment and Plan   Diagnoses and all orders for this visit:    1. Essential hypertension (Primary)  -     Basic Metabolic Panel; Future  -     spironolactone (Aldactone) 25 MG tablet; Take 1 tablet by mouth Daily.  Dispense: 90 tablet; Refill: 1  Blood pressure improved on recheck.  Continue spironolactone, triamterene hydrochlorothiazide, lisinopril, and amlodipine.  Recheck electrolytes today.  2. Hypokalemia  -     potassium chloride (K-DUR,KLOR-CON) 20 MEQ CR tablet; Take 1 tablet by mouth Every Morning.  Dispense: 90 tablet; Refill: 3  With most recent labs last month she was not on prescription replacement and potassium was 3.5.  Check follow-up labs today since initiating spironolactone.  Recommend reducing prescription potassium to 20 mg once as she has been taking 2 over-the-counter strength potassium.  3. 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  Repeat.  She has not followed up with cardiology  4. Gastroesophageal reflux disease, unspecified whether esophagitis present  -     omeprazole (priLOSEC) 20 MG capsule; Take 1 capsule by mouth Daily.  Dispense: 90 capsule; Refill: 3  Continue PPI  5. Peripheral edema  She uses Lasix in addition for as needed peripheral edema.           Follow Up   Return in about 24 weeks (around 5/1/2023) for Office visit HTN .  Patient was given instructions and counseling regarding her  condition or for health maintenance advice. Please see specific information pulled into the AVS if appropriate.     Electronically signed by Brittany Cantor MD, 11/14/22, 8:53 AM EST.

## 2022-11-15 ENCOUNTER — TELEPHONE (OUTPATIENT)
Dept: FAMILY MEDICINE CLINIC | Facility: CLINIC | Age: 82
End: 2022-11-15

## 2022-11-15 NOTE — TELEPHONE ENCOUNTER
Attempted to contact patient no answer left vm.      ----- Message from Brittany Cantor MD sent at 11/15/2022  1:05 PM EST -----  Normal electrolytes.  Continue current medications.    HUB MAY RELAY MESSAGE AND DOCUMENT

## 2022-12-28 ENCOUNTER — TELEPHONE (OUTPATIENT)
Dept: FAMILY MEDICINE CLINIC | Facility: CLINIC | Age: 82
End: 2022-12-28

## 2022-12-28 NOTE — TELEPHONE ENCOUNTER
Caller: Mariela Long    Relationship to patient: Self    Best call back number: [phone]    Date of exposure: 2 WEEKS AGO    Date of positive COVID19 test: 12/27/22    Date if possible COVID19 exposure: ON VACATION 2 WEEKS AGO    COVID19 symptoms: COUGHING, HEADACHE, NO APPETITE, LOSS OF TASTES OR SMELL    Date of initial quarantine: 12/21/22    Additional information or concerns:     What is the patients preferred pharmacy:     [address] [phone] [place] [id number] FX

## 2022-12-28 NOTE — TELEPHONE ENCOUNTER
Contacted patient to inquire further. She reports that her symptoms onset Wednesday 12/21 and she received positive covid test Thursday    She was sick with headache, coughing, nausea,vomiting, and fever. She has been using coricidin and tylenol OTC    She reports that her symptoms are intermitted and some days she feels well and other days she is sicker. She is requesting medical advice.

## 2022-12-29 ENCOUNTER — OFFICE VISIT (OUTPATIENT)
Dept: FAMILY MEDICINE CLINIC | Facility: CLINIC | Age: 82
End: 2022-12-29

## 2022-12-29 VITALS
SYSTOLIC BLOOD PRESSURE: 136 MMHG | HEIGHT: 63 IN | WEIGHT: 220.2 LBS | DIASTOLIC BLOOD PRESSURE: 80 MMHG | BODY MASS INDEX: 39.02 KG/M2 | HEART RATE: 79 BPM | OXYGEN SATURATION: 95 %

## 2022-12-29 DIAGNOSIS — U07.1 COVID-19: Primary | ICD-10-CM

## 2022-12-29 PROCEDURE — 99213 OFFICE O/P EST LOW 20 MIN: CPT | Performed by: NURSE PRACTITIONER

## 2022-12-29 RX ORDER — PREDNISONE 20 MG/1
40 TABLET ORAL DAILY
Qty: 6 TABLET | Refills: 0 | Status: SHIPPED | OUTPATIENT
Start: 2022-12-29 | End: 2023-01-01

## 2022-12-29 NOTE — TELEPHONE ENCOUNTER
Attempted to contact pt, no answer. LVM for return call.     Hub may relay message & document.    PCP is out of office. The provider covering for her would like for her to schedule an appt to be evaluated.     Sisi Prasad PA-C 12 hours ago (8:38 PM)       I recommend an office visit to evaluate her further.

## 2022-12-29 NOTE — PROGRESS NOTES
"Chief Complaint  Exposure To Known Illness (DX with Covid 1 week ago/Home test), Cough, Diarrhea, and Headache    Subjective        Mariela Long presents to [facility]  History of Present Illness  Pt is here today for evaluation due to recent covid illness. Her first positive covid test was last Thursday (1 week ago). Symptoms started 8 days ago. She is currently experiencing cough, headache, decreased appetite, and diarrhea intermittently. She has been taking Coricidin and tylenol at home for symptoms. She is also pushing fluids and resting. She has been using inhalers due to cough and some shortness of breath. Pt has a cough during visit, but otherwise lungs are clear and she is not in respiratory distress.       Objective   Vital Signs:  /80   Pulse 79   Ht 160 cm (62.99\")   Wt 99.9 kg (220 lb 3.2 oz)   SpO2 95%   BMI 39.02 kg/m²   Estimated body mass index is 39.02 kg/m² as calculated from the following:    Height as of this encounter: 160 cm (62.99\").    Weight as of this encounter: 99.9 kg (220 lb 3.2 oz).          Physical Exam  Vitals reviewed.   Constitutional:       Appearance: Normal appearance.   HENT:      Right Ear: Tympanic membrane, ear canal and external ear normal.      Left Ear: Tympanic membrane, ear canal and external ear normal.      Nose: Congestion and rhinorrhea present.      Mouth/Throat:      Pharynx: Oropharynx is clear.   Eyes:      Conjunctiva/sclera: Conjunctivae normal.   Cardiovascular:      Rate and Rhythm: Normal rate and regular rhythm.      Heart sounds: Normal heart sounds.   Pulmonary:      Effort: Pulmonary effort is normal.      Breath sounds: Normal breath sounds.      Comments: Cough present on exam  Musculoskeletal:         General: Normal range of motion.      Cervical back: Normal range of motion and neck supple.   Skin:     General: Skin is warm.   Neurological:      Mental Status: She is alert and oriented to person, place, " and time.   Psychiatric:         Mood and Affect: Mood normal.         Behavior: Behavior normal.         Thought Content: Thought content normal.        Result Review :                Assessment and Plan   Diagnoses and all orders for this visit:    1. COVID-19 (Primary)  -     predniSONE (DELTASONE) 20 MG tablet; Take 2 tablets by mouth Daily for 3 days.  Dispense: 6 tablet; Refill: 0             Follow Up   No follow-ups on file.  Patient was given instructions and counseling regarding her condition or for health maintenance advice. Please see specific information pulled into the AVS if appropriate.

## 2023-01-03 ENCOUNTER — APPOINTMENT (OUTPATIENT)
Dept: OTHER | Facility: HOSPITAL | Age: 83
End: 2023-01-03
Payer: MEDICARE

## 2023-01-03 ENCOUNTER — HOSPITAL ENCOUNTER (OUTPATIENT)
Dept: BONE DENSITY | Facility: HOSPITAL | Age: 83
Discharge: HOME | End: 2023-01-03
Payer: MEDICARE

## 2023-01-03 ENCOUNTER — HOSPITAL ENCOUNTER (OUTPATIENT)
Dept: MAMMOGRAPHY | Facility: HOSPITAL | Age: 83
Discharge: HOME | End: 2023-01-03
Payer: MEDICARE

## 2023-01-03 DIAGNOSIS — Z12.31 VISIT FOR SCREENING MAMMOGRAM: ICD-10-CM

## 2023-01-03 DIAGNOSIS — M85.88 OTHER SPECIFIED DISORDERS OF BONE DENSITY AND STRUCTURE, OTHER SITE: ICD-10-CM

## 2023-01-03 PROCEDURE — 77063 BREAST TOMOSYNTHESIS BI: CPT | Performed by: RADIOLOGY

## 2023-01-03 PROCEDURE — 77067 SCR MAMMO BI INCL CAD: CPT | Performed by: RADIOLOGY

## 2023-01-03 PROCEDURE — 77063 BREAST TOMOSYNTHESIS BI: CPT

## 2023-01-03 PROCEDURE — 77067 SCR MAMMO BI INCL CAD: CPT

## 2023-01-03 PROCEDURE — 77080 DXA BONE DENSITY AXIAL: CPT

## 2023-01-04 ENCOUNTER — TELEPHONE (OUTPATIENT)
Dept: FAMILY MEDICINE CLINIC | Facility: CLINIC | Age: 83
End: 2023-01-04
Payer: MEDICARE

## 2023-01-04 NOTE — TELEPHONE ENCOUNTER
Please call patient about osteopenia and set up an appointment to discuss medication options.    The test results show osteopenia and you are at increased risk of major fractures such as the hip or spine.  I recommend scheduling an office visit with me to discuss osteopenia and medications that can treat it to help your overall bone healt

## 2023-01-05 NOTE — TELEPHONE ENCOUNTER
Attempted to contact, no answer. Left detailed voicemail relaying provider's message. Letter has been sent.        The test results show osteopenia and you are at increased risk of major fractures such as the hip or spine.  I recommend scheduling an office visit with me to discuss osteopenia and medications that can treat it to help your overall bone health    Hub can relay and schedule upcoming appt

## 2023-01-06 ENCOUNTER — TELEPHONE (OUTPATIENT)
Dept: FAMILY MEDICINE CLINIC | Facility: CLINIC | Age: 83
End: 2023-01-06

## 2023-01-12 ENCOUNTER — OFFICE VISIT (OUTPATIENT)
Dept: FAMILY MEDICINE CLINIC | Facility: CLINIC | Age: 83
End: 2023-01-12
Payer: MEDICARE

## 2023-01-12 VITALS
HEIGHT: 63 IN | HEART RATE: 83 BPM | OXYGEN SATURATION: 95 % | DIASTOLIC BLOOD PRESSURE: 70 MMHG | BODY MASS INDEX: 39.16 KG/M2 | WEIGHT: 221 LBS | SYSTOLIC BLOOD PRESSURE: 136 MMHG

## 2023-01-12 DIAGNOSIS — M85.89 OSTEOPENIA OF MULTIPLE SITES: Primary | ICD-10-CM

## 2023-01-12 PROCEDURE — 99214 OFFICE O/P EST MOD 30 MIN: CPT | Performed by: FAMILY MEDICINE

## 2023-01-12 RX ORDER — RISEDRONATE SODIUM 35 MG/1
35 TABLET, FILM COATED ORAL
Qty: 12 TABLET | Refills: 4 | Status: SHIPPED | OUTPATIENT
Start: 2023-01-12 | End: 2023-01-17

## 2023-01-12 NOTE — PROGRESS NOTES
"Chief Complaint  osteopenia (Go over test results)    Subjective        Mariela Long presents to [facility]  History of Present Illness     A long time ago 25 years told she had osteopenia and started on Actonel for a year. After that all bone density was good until now. She has prior fractures of metatarsals, bilateral wrists after fall. No hip or spine fractures. She takes multivitamin. She does a little bit of walking. She joined exercise class once a week.            Objective   Vital Signs:  /70   Pulse 83   Ht 160 cm (62.99\")   Wt 100 kg (221 lb)   SpO2 95%   BMI 39.16 kg/m²   Estimated body mass index is 39.16 kg/m² as calculated from the following:    Height as of this encounter: 160 cm (62.99\").    Weight as of this encounter: 100 kg (221 lb).       Class 2 Severe Obesity (BMI >=35 and <=39.9). Obesity-related health conditions include the following: hypertension, GERD and lower extremity venous stasis disease. Obesity is improving with lifestyle modifications. BMI is is above average; BMI management plan is completed. We discussed increasing exercise.      Physical Exam  Vitals reviewed.   Constitutional:       General: She is not in acute distress.     Appearance: She is obese. She is not ill-appearing.   Cardiovascular:      Rate and Rhythm: Normal rate and regular rhythm.   Pulmonary:      Effort: Pulmonary effort is normal.      Breath sounds: Normal breath sounds.   Neurological:      Mental Status: She is alert.        Result Review :  The following data was reviewed by: Brittany Cantor MD on 01/12/2023:        DEXA Bone Density Axial (01/03/2023 12:08) Osteopenia of the femoral necks bilaterally, and total right hip. The ten year fracture risk assessment is calculated at 20% for major  systemic osteoporotic fracture and 4.7% for a hip fracture.  SCANNED - DEXA (04/26/2016) normal           Assessment and Plan   Diagnoses and all orders for this " visit:    1. Osteopenia of multiple sites (Primary)  -     risedronate (Actonel) 35 MG tablet; Take 1 tablet by mouth Every 7 (Seven) Days. with water on empty stomach, nothing by mouth or lie down for next 30 minutes.  Dispense: 12 tablet; Refill: 4    New.  Reviewed bone density test from 2016 which was normal and most recent 2023 abnormal with osteopenia.  She is at increased risk of fracture as well as a prior history of fracture.  She reports a remote history of taking Actonel and had no side effects.  At this time start oral bisphosphonates.  Plan bone density at 2 years.  Plan duration of therapy for 5 years.  Discussed calcium and vitamin D intake.  Regular physical activity.         Follow Up   No follow-ups on file.  Patient was given instructions and counseling regarding her condition or for health maintenance advice. Please see specific information pulled into the AVS if appropriate.     Electronically signed by Brittany Cantor MD, 01/12/23, 9:04 AM EST.

## 2023-01-17 ENCOUNTER — HOSPITAL ENCOUNTER (OUTPATIENT)
Dept: ULTRASOUND IMAGING | Facility: HOSPITAL | Age: 83
Discharge: HOME | End: 2023-01-17
Admitting: FAMILY MEDICINE
Payer: MEDICARE

## 2023-01-17 ENCOUNTER — TELEPHONE (OUTPATIENT)
Dept: FAMILY MEDICINE CLINIC | Facility: CLINIC | Age: 83
End: 2023-01-17
Payer: MEDICARE

## 2023-01-17 DIAGNOSIS — M85.89 OSTEOPENIA OF MULTIPLE SITES: Primary | ICD-10-CM

## 2023-01-17 DIAGNOSIS — I71.41 PARARENAL ABDOMINAL AORTIC ANEURYSM (AAA) WITHOUT RUPTURE: ICD-10-CM

## 2023-01-17 PROCEDURE — 76775 US EXAM ABDO BACK WALL LIM: CPT

## 2023-01-17 RX ORDER — ALENDRONATE SODIUM 35 MG/1
35 TABLET ORAL
Qty: 12 TABLET | Refills: 4 | Status: SHIPPED | OUTPATIENT
Start: 2023-01-17

## 2023-01-17 NOTE — TELEPHONE ENCOUNTER
Attempted to contact patient, no answer. Left voicemail for patient to call the office back (office # given).     Hub may relay message & document.    Brittany Cantor MD 1 hour ago (11:17 AM)     Actonel not covered on insurance formulary and which the prescription to alendronate.  Please inform patient new prescription will be sent.

## 2023-01-17 NOTE — TELEPHONE ENCOUNTER
Actonel not covered on insurance formulary and which the prescription to alendronate.  Please inform patient new prescription will be sent.

## 2023-05-01 ENCOUNTER — OFFICE VISIT (OUTPATIENT)
Dept: FAMILY MEDICINE CLINIC | Facility: CLINIC | Age: 83
End: 2023-05-01
Payer: MEDICARE

## 2023-05-01 VITALS
OXYGEN SATURATION: 94 % | HEIGHT: 63 IN | BODY MASS INDEX: 39.09 KG/M2 | HEART RATE: 68 BPM | DIASTOLIC BLOOD PRESSURE: 70 MMHG | SYSTOLIC BLOOD PRESSURE: 130 MMHG | WEIGHT: 220.6 LBS

## 2023-05-01 DIAGNOSIS — J30.1 SEASONAL ALLERGIC RHINITIS DUE TO POLLEN: ICD-10-CM

## 2023-05-01 DIAGNOSIS — L98.9 SKIN LESION: ICD-10-CM

## 2023-05-01 DIAGNOSIS — R73.03 PREDIABETES: Chronic | ICD-10-CM

## 2023-05-01 DIAGNOSIS — I10 ESSENTIAL HYPERTENSION: Primary | Chronic | ICD-10-CM

## 2023-05-01 DIAGNOSIS — J45.40 MODERATE PERSISTENT ASTHMA WITHOUT COMPLICATION: Chronic | ICD-10-CM

## 2023-05-01 LAB
EXPIRATION DATE: NORMAL
HBA1C MFR BLD: 5.7 %
Lab: NORMAL

## 2023-05-01 RX ORDER — SPIRONOLACTONE 25 MG/1
25 TABLET ORAL DAILY
Qty: 90 TABLET | Refills: 1 | Status: SHIPPED | OUTPATIENT
Start: 2023-05-01

## 2023-05-01 RX ORDER — AMLODIPINE BESYLATE 5 MG/1
5 TABLET ORAL DAILY
Qty: 90 TABLET | Refills: 1 | Status: SHIPPED | OUTPATIENT
Start: 2023-05-01

## 2023-05-01 RX ORDER — LISINOPRIL 40 MG/1
40 TABLET ORAL NIGHTLY
Qty: 90 TABLET | Refills: 1 | Status: SHIPPED | OUTPATIENT
Start: 2023-05-01

## 2023-05-01 RX ORDER — TRIAMTERENE AND HYDROCHLOROTHIAZIDE 75; 50 MG/1; MG/1
1 TABLET ORAL DAILY
Qty: 90 TABLET | Refills: 1 | Status: SHIPPED | OUTPATIENT
Start: 2023-05-01

## 2023-05-01 RX ORDER — FLUTICASONE FUROATE AND VILANTEROL 200; 25 UG/1; UG/1
1 POWDER RESPIRATORY (INHALATION) DAILY
Qty: 3 EACH | Refills: 3 | Status: SHIPPED | OUTPATIENT
Start: 2023-05-01

## 2023-05-01 RX ORDER — ALBUTEROL SULFATE 90 UG/1
2 AEROSOL, METERED RESPIRATORY (INHALATION) EVERY 4 HOURS PRN
Qty: 18 G | Refills: 3 | Status: SHIPPED | OUTPATIENT
Start: 2023-05-01

## 2023-05-01 RX ORDER — MONTELUKAST SODIUM 10 MG/1
10 TABLET ORAL NIGHTLY
Qty: 90 TABLET | Refills: 3 | Status: SHIPPED | OUTPATIENT
Start: 2023-05-01

## 2023-05-01 NOTE — PROGRESS NOTES
"Chief Complaint  Hypertension and Prediabetes    Subjective        Mariela Long presents to [facility]  Hypertension  This is a chronic problem. The problem is controlled. Current antihypertension treatment includes diuretics, calcium channel blockers and ACE inhibitors.     Allergy season she has done really well. She gets congested and voice change.     She has skin lesion on chest, scab and look raw. Onset for months to a year.           Objective   Vital Signs:  /70   Pulse 68   Ht 160 cm (62.99\")   Wt 100 kg (220 lb 9.6 oz)   SpO2 94%   BMI 39.09 kg/m²   Estimated body mass index is 39.09 kg/m² as calculated from the following:    Height as of this encounter: 160 cm (62.99\").    Weight as of this encounter: 100 kg (220 lb 9.6 oz).             Physical Exam  Vitals reviewed.   Constitutional:       General: She is not in acute distress.     Appearance: She is obese. She is not ill-appearing.   HENT:      Nose: No mucosal edema or rhinorrhea.   Cardiovascular:      Rate and Rhythm: Normal rate and regular rhythm.   Pulmonary:      Effort: Pulmonary effort is normal.      Breath sounds: Normal breath sounds.   Chest:       Neurological:      Mental Status: She is alert.      Gait: Gait normal.   Psychiatric:         Mood and Affect: Mood normal.        Result Review :  The following data was reviewed by: Brittany Cantor MD on 05/01/2023:  Common labs        10/26/2022    08:47 11/14/2022    08:54 5/1/2023    13:23   Common Labs   Glucose 83   91      BUN 14   16      Creatinine 0.74   0.74      Sodium 138   143      Potassium 4.5   4.0      Chloride 102   105      Calcium 9.1   9.5      Hemoglobin A1C   5.7       A1C Last 3 Results        10/14/2022    10:51 5/1/2023    13:23   HGBA1C Last 3 Results   Hemoglobin A1C 5.90   5.7                    Assessment and Plan   Diagnoses and all orders for this visit:    1. Essential hypertension (Primary)  -     lisinopril " (PRINIVIL,ZESTRIL) 40 MG tablet; Take 1 tablet by mouth Every Night.  Dispense: 90 tablet; Refill: 1  -     amLODIPine (NORVASC) 5 MG tablet; Take 1 tablet by mouth Daily.  Dispense: 90 tablet; Refill: 1  -     spironolactone (Aldactone) 25 MG tablet; Take 1 tablet by mouth Daily.  Dispense: 90 tablet; Refill: 1  -     triamterene-hydrochlorothiazide (MAXZIDE) 75-50 MG per tablet; Take 1 tablet by mouth Daily.  Dispense: 90 tablet; Refill: 1  Stable.  Well-controlled with current medications.  In the past she was unable to tolerate higher doses of amlodipine 10 mg due to edema.  Reassess in 6 months  2. Prediabetes  -     POC Glycosylated Hemoglobin (Hb A1C)  Proved.  Counseled on avoiding sugar in diet.  3. Moderate persistent asthma without complication  -     Fluticasone Furoate-Vilanterol (BREO ELLIPTA) 200-25 MCG/ACT inhaler; Inhale 1 puff Daily.  Dispense: 3 each; Refill: 3  -     montelukast (SINGULAIR) 10 MG tablet; Take 1 tablet by mouth Every Night.  Dispense: 90 tablet; Refill: 3  -     albuterol sulfate  (90 Base) MCG/ACT inhaler; Inhale 2 puffs Every 4 (Four) Hours As Needed for Wheezing.  Dispense: 18 g; Refill: 3  Patient has seen pulmonology in past but requested I manage her inhalers.  Continue allergen control with montelukast and antihistamine as well.  4. Seasonal allergic rhinitis due to pollen  Continue allergen control with montelukast and antihistamine as well.  5. Skin lesion  -     Ambulatory Referral to Dermatology  New. Discussed with patient nonhealing skin lesion needs evaluation by dermatology and referral placed.         Follow Up   Return in about 6 months (around 10/27/2023) for MWV and fasting labs.  Patient was given instructions and counseling regarding her condition or for health maintenance advice. Please see specific information pulled into the AVS if appropriate.     Electronically signed by Brittany Cantor MD, 05/01/23, 1:31 PM EDT.

## 2023-11-01 ENCOUNTER — LAB (OUTPATIENT)
Age: 83
End: 2023-11-01
Payer: MEDICARE

## 2023-11-01 ENCOUNTER — OFFICE VISIT (OUTPATIENT)
Age: 83
End: 2023-11-01
Payer: MEDICARE

## 2023-11-01 VITALS
OXYGEN SATURATION: 96 % | WEIGHT: 225.1 LBS | HEIGHT: 63 IN | DIASTOLIC BLOOD PRESSURE: 70 MMHG | SYSTOLIC BLOOD PRESSURE: 140 MMHG | HEART RATE: 70 BPM | BODY MASS INDEX: 39.88 KG/M2

## 2023-11-01 DIAGNOSIS — Z13.0 SCREENING FOR DEFICIENCY ANEMIA: ICD-10-CM

## 2023-11-01 DIAGNOSIS — K21.9 GASTROESOPHAGEAL REFLUX DISEASE, UNSPECIFIED WHETHER ESOPHAGITIS PRESENT: Chronic | ICD-10-CM

## 2023-11-01 DIAGNOSIS — E87.6 HYPOKALEMIA: ICD-10-CM

## 2023-11-01 DIAGNOSIS — J45.40 MODERATE PERSISTENT ASTHMA WITHOUT COMPLICATION: Chronic | ICD-10-CM

## 2023-11-01 DIAGNOSIS — Z00.00 MEDICARE ANNUAL WELLNESS VISIT, SUBSEQUENT: Primary | ICD-10-CM

## 2023-11-01 DIAGNOSIS — R00.2 PALPITATIONS: ICD-10-CM

## 2023-11-01 DIAGNOSIS — I10 ESSENTIAL HYPERTENSION: Chronic | ICD-10-CM

## 2023-11-01 DIAGNOSIS — I49.1 PREMATURE ATRIAL CONTRACTIONS: ICD-10-CM

## 2023-11-01 DIAGNOSIS — E66.01 CLASS 2 SEVERE OBESITY DUE TO EXCESS CALORIES WITH SERIOUS COMORBIDITY AND BODY MASS INDEX (BMI) OF 39.0 TO 39.9 IN ADULT: ICD-10-CM

## 2023-11-01 DIAGNOSIS — Z23 IMMUNIZATION DUE: ICD-10-CM

## 2023-11-01 DIAGNOSIS — R73.03 PREDIABETES: Chronic | ICD-10-CM

## 2023-11-01 DIAGNOSIS — M85.89 OSTEOPENIA OF MULTIPLE SITES: ICD-10-CM

## 2023-11-01 LAB
DEPRECATED RDW RBC AUTO: 43.7 FL (ref 37–54)
ERYTHROCYTE [DISTWIDTH] IN BLOOD BY AUTOMATED COUNT: 13 % (ref 12.3–15.4)
HBA1C MFR BLD: 5.8 % (ref 4.8–5.6)
HCT VFR BLD AUTO: 37.6 % (ref 34–46.6)
HGB BLD-MCNC: 12.3 G/DL (ref 12–15.9)
MCH RBC QN AUTO: 29.8 PG (ref 26.6–33)
MCHC RBC AUTO-ENTMCNC: 32.7 G/DL (ref 31.5–35.7)
MCV RBC AUTO: 91 FL (ref 79–97)
PLATELET # BLD AUTO: 235 10*3/MM3 (ref 140–450)
PMV BLD AUTO: 12.6 FL (ref 6–12)
RBC # BLD AUTO: 4.13 10*6/MM3 (ref 3.77–5.28)
WBC NRBC COR # BLD: 6.6 10*3/MM3 (ref 3.4–10.8)

## 2023-11-01 PROCEDURE — 36415 COLL VENOUS BLD VENIPUNCTURE: CPT | Performed by: FAMILY MEDICINE

## 2023-11-01 PROCEDURE — 83036 HEMOGLOBIN GLYCOSYLATED A1C: CPT | Performed by: FAMILY MEDICINE

## 2023-11-01 PROCEDURE — 85027 COMPLETE CBC AUTOMATED: CPT | Performed by: FAMILY MEDICINE

## 2023-11-01 PROCEDURE — 80061 LIPID PANEL: CPT | Performed by: FAMILY MEDICINE

## 2023-11-01 PROCEDURE — 84443 ASSAY THYROID STIM HORMONE: CPT | Performed by: FAMILY MEDICINE

## 2023-11-01 PROCEDURE — 80053 COMPREHEN METABOLIC PANEL: CPT | Performed by: FAMILY MEDICINE

## 2023-11-01 NOTE — LETTER
[facility]  Vaccine Consent Form    Patient Name:  Mariela Long  Patient :  1940     Vaccine(s) Ordered    Fluzone High-Dose 65+yrs ([id number])  Pneumococcal Conjugate Vaccine 20-Valent All        Screening Checklist  The following questions should be completed prior to vaccination. If you answer “yes” to any question, it does not necessarily mean you should not be vaccinated. It just means we may need to clarify or ask more questions. If a question is unclear, please ask your healthcare provider to explain it.    Yes No   Any fever or moderate to severe illness today (mild illness and/or antibiotic treatment are not contraindications)?     Do you have a history of a serious reaction to any previous vaccinations, such as anaphylaxis, encephalopathy within 7 days, Guillain-[place] syndrome within 6 weeks, seizure?     Have you received any live vaccine(s) in the past month (MMR, PABLO)?     Do you have an anaphylactic allergy to latex (DTaP, DTaP-IPV, Hep A, Hep B, MenB, RV, Td, Tdap), baker’s yeast (Hep B, HPV), or gelatin (PABLO, MMR)?     Do you have an anaphylactic allergy to neomycin ([place], PABLO, MMR, IPV, Hep A), polymyxin B (IPV), or streptomycin (IPV)?      Any cancer, leukemia, AIDS, or other immune system disorder? (PABLO, MMR, RV)     Do you have a parent, brother, or sister with an immune system problem (if immune competence of vaccine recipient clinically verified, can proceed)? (MMR, PABLO)     Any recent steroid treatments for >2 weeks, chemotherapy, or radiation treatment? (PABLO, MMR)     Have you received antibody-containing blood transfusions or IVIG in the past 11 months (recommended interval is dependent on product)? (MMR, PABLO)     Have you taken antiviral drugs (acyclovir, famciclovir, valacyclovir) in the last 24 or 48 hours, respectively (PABLO)?      Are you pregnant or planning to become pregnant within 1 month? (PABLO, MMR, HPV, IPV, MenB; For hep B- refer to Engerix-B)     For infants,  have you ever been told your child has had intussusception or a medical emergency involving obstruction of the intestine (RV)? If not for an infant, can skip this question.         *Ordering Physician/APC should be consulted if “yes” is checked by the patient or guardian above.      I have received, read, and understand the Vaccine Information Statement (VIS) for each vaccine ordered above.  I have considered my health status as well as the health status of my close contacts.  I have taken the opportunity to discuss my vaccine questions with my health care provider.   I have requested that the ordered vaccine(s) be given to me.  I understand the benefits and risks of the vaccines.  I understand that I should remain in the clinic for 15 minutes after receiving the vaccine(s).  _________________________________________________________  Signature of Patient or Parent/Legal Guardian ____________________  Date

## 2023-11-01 NOTE — PROGRESS NOTES
The ABCs of the Annual Wellness Visit  Subsequent Medicare Wellness Visit    Subjective    Mariela Long is a 83 y.o. female who presents for a Subsequent Medicare Wellness Visit.    The following portions of the patient's history were reviewed and   updated as appropriate: allergies, current medications, past family history, past medical history, past social history, past surgical history, and problem list.    Compared to one year ago, the patient feels her physical   health is the same.    Compared to one year ago, the patient feels her mental   health is the same.    Recent Hospitalizations:  She was not admitted to the hospital during the last year.       Current Medical Providers:  Patient Care Team:  Brittany Cantor MD as PCP - General (Family Medicine)  Cristi Montana MD as Consulting Physician (Urology)  Carine Mathur DO as Consulting Physician (Pulmonary Disease)  Lalo Fish MD as Consulting Physician (Infectious Diseases)    Outpatient Medications Prior to Visit   Medication Sig Dispense Refill    albuterol (ACCUNEB) 0.63 MG/3ML nebulizer solution Take 3 mL by nebulization Every 6 (Six) Hours As Needed for Wheezing. 24 vial 1    albuterol sulfate  (90 Base) MCG/ACT inhaler Inhale 2 puffs Every 4 (Four) Hours As Needed for Wheezing. 18 g 3    alendronate (FOSAMAX) 35 MG tablet Take 1 tablet by mouth Every 7 (Seven) Days. 12 tablet 4    amLODIPine (NORVASC) 5 MG tablet Take 1 tablet by mouth Daily. 90 tablet 1    Fluticasone Furoate-Vilanterol (BREO ELLIPTA) 200-25 MCG/ACT inhaler Inhale 1 puff Daily. 3 each 3    Fluticasone Furoate-Vilanterol (BREO ELLIPTA) 200-25 MCG/INH inhaler Inhale 1 puff Daily. 3 each 3    furosemide (LASIX) 40 MG tablet Take 1 tablet by mouth Daily.      lisinopril (PRINIVIL,ZESTRIL) 40 MG tablet Take 1 tablet by mouth Every Night. 90 tablet 1    loratadine (CLARITIN) 10 MG tablet Take 1 tablet by mouth Daily.      montelukast (SINGULAIR) 10 MG tablet  "Take 1 tablet by mouth Every Night. 90 tablet 3    multivitamin with minerals tablet tablet Take 1 tablet by mouth Daily.      omeprazole (priLOSEC) 20 MG capsule Take 1 capsule by mouth Daily. 90 capsule 3    oxybutynin XL (DITROPAN XL) 15 MG 24 hr tablet oxybutynin chloride ER 15 mg tablet,extended release 24 hr   Take 1 tablet by mouth once daily      potassium chloride (K-DUR,KLOR-CON) 20 MEQ CR tablet Take 1 tablet by mouth Every Morning. 90 tablet 3    spironolactone (Aldactone) 25 MG tablet Take 1 tablet by mouth Daily. 90 tablet 1    triamterene-hydrochlorothiazide (MAXZIDE) 75-50 MG per tablet Take 1 tablet by mouth Daily. 90 tablet 1     No facility-administered medications prior to visit.       No opioid medication identified on active medication list. I have reviewed chart for other potential  high risk medication/s and harmful drug interactions in the elderly.        Aspirin is not on active medication list.  Aspirin use is not indicated based on review of current medical condition/s. Risk of harm outweighs potential benefits.  .    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  ACP discussion was held with the patient during this visit. Patient has an advance directive (not in EMR), copy requested.     Objective    Vitals:    11/01/23 1122   BP: 140/70   Pulse: 70   SpO2: 96%   Weight: 102 kg (225 lb 1.6 oz)   Height: 160 cm (62.99\")   PainSc:   3     Estimated body mass index is 39.88 " "kg/m² as calculated from the following:    Height as of this encounter: 160 cm (62.99\").    Weight as of this encounter: 102 kg (225 lb 1.6 oz).           Does the patient have evidence of cognitive impairment? No          HEALTH RISK ASSESSMENT    Smoking Status:  Social History     Tobacco Use   Smoking Status Never   Smokeless Tobacco Never     Alcohol Consumption:  Social History     Substance and Sexual Activity   Alcohol Use Never     Fall Risk Screen:    KENNEDI Fall Risk Assessment was completed, and patient is at LOW risk for falls.Assessment completed on:2023    Depression Screenin/1/2023    11:26 AM   PHQ-2/PHQ-9 Depression Screening   Little Interest or Pleasure in Doing Things 0-->not at all   Feeling Down, Depressed or Hopeless 0-->not at all   PHQ-9: Brief Depression Severity Measure Score 0       Health Habits and Functional and Cognitive Screenin/1/2023    11:00 AM   Functional & Cognitive Status   Do you have difficulty preparing food and eating? No   Do you have difficulty bathing yourself, getting dressed or grooming yourself? No   Do you have difficulty using the toilet? No   Do you have difficulty moving around from place to place? No   Do you have trouble with steps or getting out of a bed or a chair? No   Current Diet Frequent Junk Food   Dental Exam Up to date   Eye Exam Up to date   Exercise (times per week) 0 times per week   Current Exercises Include No Regular Exercise   Do you need help using the phone?  No   Are you deaf or do you have serious difficulty hearing?  No   Do you need help to go to places out of walking distance? Yes   Do you need help shopping? No   Do you need help preparing meals?  No   Do you need help with housework?  No   Do you need help with laundry? No   Do you need help taking your medications? No   Do you need help managing money? No   Do you ever drive or ride in a car without wearing a seat belt? No   Have you felt unusual stress, anger " or loneliness in the last month? No   Who do you live with? Alone   If you need help, do you have trouble finding someone available to you? No   Have you been bothered in the last four weeks by sexual problems? No   Do you have difficulty concentrating, remembering or making decisions? No       Age-appropriate Screening Schedule:  Refer to the list below for future screening recommendations based on patient's age, sex and/or medical conditions. Orders for these recommended tests are listed in the plan section. The patient has been provided with a written plan.    Health Maintenance   Topic Date Due    ZOSTER VACCINE (2 of 3) 01/24/2010    TDAP/TD VACCINES (2 - Td or Tdap) 12/16/2021    COVID-19 Vaccine (4 - 2023-24 season) 09/01/2023    HEMOGLOBIN A1C  11/01/2023    BMI FOLLOWUP  01/12/2024    ANNUAL WELLNESS VISIT  11/01/2024    DXA SCAN  01/03/2025    INFLUENZA VACCINE  Completed    Pneumococcal Vaccine 65+  Completed                Immunization History   Administered Date(s) Administered    COVID-19 (PFIZER) Purple Cap Monovalent 02/03/2021, 02/25/2021, 09/30/2021    Fluzone High Dose =>65 Years ([facility] ONLY) 10/26/2016, 10/19/2017, 11/15/2017    Fluzone High-Dose 65+yrs 09/30/2021, 10/14/2022, 11/01/2023    Hepatitis A 03/11/2019, 10/03/2019    Influenza, Unspecified 09/01/2021    Pneumococcal Conjugate 13-Valent (PCV13) 04/19/2017    Pneumococcal Conjugate 20-Valent (PCV20) 11/01/2023    Pneumococcal Polysaccharide (PPSV23) 12/16/2010, 11/15/2014    Pneumococcal, Unspecified 03/16/2000    Tdap 12/16/2011    Zostavax 11/29/2009         CMS Preventative Services Quick Reference  Risk Factors Identified During Encounter  Immunizations Discussed/Encouraged: Tdap, Influenza, and Shingrix  The above risks/problems have been discussed with the patient.  Pertinent information has been shared with the patient in the After Visit Summary.  An After Visit Summary and PPPS were made available to the patient.    Follow Up:  "  Next Medicare Wellness visit to be scheduled in 1 year.       Additional E&M Note during same encounter follows:  Patient has multiple medical problems which are significant and separately identifiable that require additional work above and beyond the Medicare Wellness Visit.      Chief Complaint  Medicare Wellness-subsequent and Annual Exam    Subjective        HPI  Mariela Long is also being seen today for physical, hypertension, asthma, prediabetes, GERD, osteopenia    She has chronic back pain. She saw orthopedics and was told it is arthritis everywhere. Right rotator cuff is torn completely and she is not interested in shoulder replacement.     Home blood pressure has been higher. Cuff reports irregular heart beat. She has seen cardiology one time in the past. She had prior cardiac testing.              Objective   Vital Signs:  /70   Pulse 70   Ht 160 cm (62.99\")   Wt 102 kg (225 lb 1.6 oz)   SpO2 96%   BMI 39.88 kg/m²     Physical Exam  Constitutional:       General: She is not in acute distress.     Appearance: She is morbidly obese.   HENT:      Right Ear: Tympanic membrane and ear canal normal.      Left Ear: Tympanic membrane and ear canal normal.      Nose: No congestion or rhinorrhea.      Mouth/Throat:      Mouth: Mucous membranes are moist.      Pharynx: No oropharyngeal exudate or posterior oropharyngeal erythema.   Eyes:      General:         Right eye: No discharge.         Left eye: No discharge.      Conjunctiva/sclera: Conjunctivae normal.   Neck:      Thyroid: No thyromegaly.   Cardiovascular:      Rate and Rhythm: Normal rate. Rhythm irregular. Frequent Extrasystoles are present.  Pulmonary:      Effort: Pulmonary effort is normal.      Breath sounds: Normal breath sounds.   Abdominal:      Palpations: Abdomen is soft. There is no hepatomegaly.      Tenderness: There is no abdominal tenderness.   Musculoskeletal:      Cervical back: Neck supple.      Right lower leg: No edema. "      Left lower leg: No edema.   Lymphadenopathy:      Head:      Right side of head: No submandibular, preauricular or posterior auricular adenopathy.      Left side of head: No submandibular, preauricular or posterior auricular adenopathy.      Cervical: No cervical adenopathy.   Skin:     General: Skin is warm.   Neurological:      Mental Status: She is alert and oriented to person, place, and time.   Psychiatric:         Mood and Affect: Mood normal.         Behavior: Behavior normal.         Thought Content: Thought content normal.         Judgment: Judgment normal.                  ECG 12 Lead    Date/Time: 11/1/2023 11:55 AM  Performed by: Brittany Cantor MD    Authorized by: Brittany Cantor MD  Comparison: compared with previous ECG from 8/30/2020  Comparison to previous ECG: New PAC and Q waves V1-2  Rhythm: sinus rhythm  Ectopy: atrial premature contractions  Rate: normal  Rate comments: 63  Q waves: V1 and V2      Clinical impression: abnormal EKG       SCANNED - NUCLEAR STRESS (08/24/2021)   SCANNED EKG (08/30/2020)      Assessment and Plan   Diagnoses and all orders for this visit:    1. Medicare annual wellness visit, subsequent (Primary)    2. Immunization due  -     Fluzone High-Dose 65+yrs (3442-9133)  -     Pneumococcal Conjugate Vaccine 20-Valent All  Also recommend Tdap and Shingrix vaccines at local pharmacy.  3. Essential hypertension  -     Comprehensive Metabolic Panel; Future  -     Lipid Panel; Future  -     TSH Rfx On Abnormal To Free T4; Future  Blood pressure borderline elevated today.  Continue amlodipine 5 mg daily, lisinopril 40 mg daily, spironolactone 25 mg daily, and triamterene hydrochlorothiazide 75-50 mg daily.  Recheck in 6 months.  4. Prediabetes  -     Comprehensive Metabolic Panel; Future  -     Hemoglobin A1c; Future  Check follow-up labs.  Dietary counseling.  5. Gastroesophageal reflux disease, unspecified whether esophagitis present  Continue omeprazole 20 mg  daily.  6. Hypokalemia  -     Comprehensive Metabolic Panel; Future  Continue potassium chloride 20 mill equivalents daily.  7. Osteopenia of multiple sites  Initiated on alendronate in January 2023.  Continue for 5 years due to elevated FRAX score.  8. Moderate persistent asthma without complication  Continue Breo with albuterol when needed.  9. Class 2 severe obesity due to excess calories with serious comorbidity and body mass index (BMI) of 39.0 to 39.9 in adult  Class 2 Severe Obesity (BMI >=35 and <=39.9). Obesity-related health conditions include the following: hypertension, GERD, and prediabetes . Obesity is unchanged. BMI is is above average; BMI management plan is completed. We discussed Information on healthy weight added to patient's after visit summary.    10. Screening for deficiency anemia  -     CBC (No Diff); Future    11. Palpitations  -     ECG 12 Lead  New.  PACs noted on EKG.  No signs of atrial fibrillation.  12. Premature atrial contractions  New.  PACs noted on EKG.  No signs of atrial fibrillation.         Follow Up   Return in about 6 months (around 5/1/2024) for Office visit HTN, prediabetes AND , MWV and fasting labs 1 year.  Patient was given instructions and counseling regarding her condition or for health maintenance advice. Please see specific information pulled into the AVS if appropriate.     Electronically signed by Brittany Cantor MD, 11/01/23, 12:54 PM EDT.

## 2023-11-01 NOTE — PATIENT INSTRUCTIONS
Advance Care Planning and Advance Directives     You make decisions on a daily basis - decisions about where you want to live, your career, your home, your life. Perhaps one of the most important decisions you face is your choice for future medical care. Take time to talk with your family and your healthcare team and start planning today.  Advance Care Planning is a process that can help you:  Understand possible future healthcare decisions in light of your own experiences  Reflect on those decision in light of your goals and values  Discuss your decisions with those closest to you and the healthcare professionals that care for you  Make a plan by creating a document that reflects your wishes    Surrogate Decision Maker  In the event of a medical emergency, which has left you unable to communicate or to make your own decisions, you would need someone to make decisions for you.  It is important to discuss your preferences for medical treatment with this person while you are in good health.     Qualities of a surrogate decision maker:  Willing to take on this role and responsibility  Knows what you want for future medical care  Willing to follow your wishes even if they don't agree with them  Able to make difficult medical decisions under stressful circumstances    Advance Directives  These are legal documents you can create that will guide your healthcare team and decision maker(s) when needed. These documents can be stored in the electronic medical record.    Living Will - a legal document to guide your care if you have a terminal condition or a serious illness and are unable to communicate. States vary by statute in document names/types, but most forms may include one or more of the following:        -  Directions regarding life-prolonging treatments        -  Directions regarding artificially provided nutrition/hydration        -  Choosing a healthcare decision maker        -  Direction regarding organ/tissue  donation    Durable Power of  for Healthcare - this document names an -in-fact to make medical decisions for you, but it may also allow this person to make personal and financial decisions for you. Please seek the advice of an  if you need this type of document.    **Advance Directives are not required and no one may discriminate against you if you do not sign one.    Medical Orders  Many states allow specific forms/orders signed by your physician to record your wishes for medical treatment in your current state of health. This form, signed in personal communication with your physician, addresses resuscitation and other medical interventions that you may or may not want.      For more information or to schedule a time with a [facility] Advance Care Planning Facilitator contact: [facility] or call [phone] and someone will contact you directly.  You are due for adacel Tdap vaccination. (provides protection against tetanus, diptheria and whooping cough) Please  get the immunization at your local pharmacy at your earliest convenience. This immunization will next be due in 10 years. Please click on the link for more information about this vaccine.    [facility] Tdap Vaccine Information    You are due for Shingrix vaccination series ( the newest shingles vaccine).  It is a two shot series spaced 2-6 months apart. Please get this vaccine series started at your earliest convenience at your local pharmacy to help avoid shingles outbreak. It is more effective than the old Zostavax vaccine and is recommended even if you have had the Zostavax vaccine in the past.  Once the Shingrix series is completed, it does not need to be repeated.   For more information, please look at the website below:  [facility] Shingrix Vaccine Information      Medicare Wellness  Personal Prevention Plan of Service     Date of Office Visit:    Encounter Provider:  Brittany Cantor MD  Place of Service:  [facility]  Patient Name: Mariela Long  :  1940    As part of the Medicare Wellness portion of your visit today, we are providing you with this personalized preventive plan of services (PPPS). This plan is based upon recommendations of the [place] Preventive Services Task Force (USPSTF) and the Advisory Committee on Immunization Practices (ACIP).    This lists the preventive care services that should be considered, and provides dates of when you are due. Items listed as completed are up-to-date and do not require any further intervention.    Health Maintenance   Topic Date Due    ZOSTER VACCINE (2 of 3) 2010    TDAP/TD VACCINES (2 - Td or Tdap) 2021    INFLUENZA VACCINE  2023    COVID-19 Vaccine (4 - - season) 2023    HEMOGLOBIN A1C  2023    BMI FOLLOWUP  2024    ANNUAL WELLNESS VISIT  2024    DXA SCAN  2025    Pneumococcal Vaccine 65+  Completed       Orders Placed This Encounter   Procedures    Fluzone High-Dose 65+yrs ([id number])    Pneumococcal Conjugate Vaccine 20-Valent All    CBC (No Diff)     Standing Status:   Future     Standing Expiration Date:   2024     Order Specific Question:   Release to patient     Answer:   Routine Release [[id number]]    Comprehensive Metabolic Panel     Standing Status:   Future     Standing Expiration Date:   2024     Order Specific Question:   Release to patient     Answer:   Routine Release [[id number]]    Lipid Panel     Standing Status:   Future     Standing Expiration Date:   2024     Order Specific Question:   Release to patient     Answer:   Routine Release [[id number]]    TSH Rfx On Abnormal To Free T4     Standing Status:   Future     Standing Expiration Date:   2024     Order Specific Question:   Release to patient     Answer:   Routine Release [[id number]]    Hemoglobin A1c     Standing Status:   Future     Standing Expiration Date:   2024     Order  Specific Question:   Release to patient     Answer:   Routine Release [[id number]]    ECG 12 Lead     Order Specific Question:   Reason for Exam:     Answer:   palpitations     Order Specific Question:   Release to patient     Answer:   Routine Release [[id number]]       Return in about 6 months (around 5/1/2024) for Office visit HTN, prediabetes AND , MWV and fasting labs 1 year.        lab

## 2023-11-02 LAB
ALBUMIN SERPL-MCNC: 4.2 G/DL (ref 3.5–5.2)
ALBUMIN/GLOB SERPL: 1.3 G/DL
ALP SERPL-CCNC: 77 U/L (ref 39–117)
ALT SERPL W P-5'-P-CCNC: 13 U/L (ref 1–33)
ANION GAP SERPL CALCULATED.3IONS-SCNC: 8 MMOL/L (ref 5–15)
AST SERPL-CCNC: 21 U/L (ref 1–32)
BILIRUB SERPL-MCNC: 0.4 MG/DL (ref 0–1.2)
BUN SERPL-MCNC: 18 MG/DL (ref 8–23)
BUN/CREAT SERPL: 22 (ref 7–25)
CALCIUM SPEC-SCNC: 9.8 MG/DL (ref 8.6–10.5)
CHLORIDE SERPL-SCNC: 107 MMOL/L (ref 98–107)
CHOLEST SERPL-MCNC: 181 MG/DL (ref 0–200)
CO2 SERPL-SCNC: 27 MMOL/L (ref 22–29)
CREAT SERPL-MCNC: 0.82 MG/DL (ref 0.57–1)
EGFRCR SERPLBLD CKD-EPI 2021: 72 ML/MIN/1.73
GLOBULIN UR ELPH-MCNC: 3.2 GM/DL
GLUCOSE SERPL-MCNC: 102 MG/DL (ref 65–99)
HDLC SERPL-MCNC: 55 MG/DL (ref 40–60)
LDLC SERPL CALC-MCNC: 111 MG/DL (ref 0–100)
LDLC/HDLC SERPL: 1.99 {RATIO}
POTASSIUM SERPL-SCNC: 4.5 MMOL/L (ref 3.5–5.2)
PROT SERPL-MCNC: 7.4 G/DL (ref 6–8.5)
SODIUM SERPL-SCNC: 142 MMOL/L (ref 136–145)
TRIGL SERPL-MCNC: 82 MG/DL (ref 0–150)
TSH SERPL DL<=0.05 MIU/L-ACNC: 2.87 UIU/ML (ref 0.27–4.2)
VLDLC SERPL-MCNC: 15 MG/DL (ref 5–40)

## 2024-02-27 ENCOUNTER — READMISSION MANAGEMENT (OUTPATIENT)
Dept: CALL CENTER | Facility: HOSPITAL | Age: 84
End: 2024-02-27
Payer: MEDICARE

## 2024-02-28 NOTE — OUTREACH NOTE
Prep Survey      Flowsheet Row Responses   [facility] facility patient discharged from? Non-  [[facility]]   Is LACE score < 7 ? Non- Discharge   Eligibility Not Eligible   What are the reasons patient is not eligible? Other  [Hospice/Medical Facility]   Does the patient have one of the following disease processes/diagnoses(primary or secondary)? Other   Prep survey completed? Yes            Cristin WASSERMAN - Registered Nurse

## 2024-03-14 ENCOUNTER — READMISSION MANAGEMENT (OUTPATIENT)
Dept: CALL CENTER | Facility: HOSPITAL | Age: 84
End: 2024-03-14
Payer: MEDICARE

## 2024-03-14 ENCOUNTER — TELEPHONE (OUTPATIENT)
Age: 84
End: 2024-03-14
Payer: MEDICARE

## 2024-03-14 DIAGNOSIS — M85.89 OSTEOPENIA OF MULTIPLE SITES: ICD-10-CM

## 2024-03-14 RX ORDER — ALENDRONATE SODIUM 35 MG/1
35 TABLET ORAL
Qty: 12 TABLET | Refills: 3 | Status: SHIPPED | OUTPATIENT
Start: 2024-03-14

## 2024-03-14 NOTE — TELEPHONE ENCOUNTER
Caller: SCHEDULING DEPARTMENT    Best call back number:       [phone]     New or established patient?  [] New   Established    Date of discharge:     3/15    Facility discharged from:     [facility]    Diagnosis/Symptoms:     FEMUR FRACTURE    Length of stay (If applicable):     ADMITTED ON 2/27

## 2024-03-14 NOTE — OUTREACH NOTE
Prep Survey      Flowsheet Row Responses   [facility] facility patient discharged from? Non-[facility]   Is LACE score < 7 ? Non- Discharge   Eligibility [facility]   Date of Admission 02/27/24   Date of Discharge 03/15/24   Discharge diagnosis femur fracture   Does the patient have one of the following disease processes/diagnoses(primary or secondary)? Other   Prep survey completed? Yes            Vianey PARRA - Registered Nurse

## 2024-03-14 NOTE — TELEPHONE ENCOUNTER
Rx Refill Note  Requested Prescriptions     Pending Prescriptions Disp Refills    alendronate (FOSAMAX) 35 MG tablet [Pharmacy Med Name: ALENDRONATE  35MG  TAB] 12 tablet 3     Sig: TAKE 1 TABLET BY MOUTH EVERY 7  DAYS      Last office visit with prescribing clinician: 11/1/2023   Last telemedicine visit with prescribing clinician: Visit date not found   Next office visit with prescribing clinician: 5/1/2024                         Would you like a call back once the refill request has been completed: [] Yes [] No    If the office needs to give you a call back, can they leave a voicemail: [] Yes [] No    Rosi Velarde MA  03/14/24, 07:52 EDT

## 2024-03-18 ENCOUNTER — TRANSITIONAL CARE MANAGEMENT TELEPHONE ENCOUNTER (OUTPATIENT)
Dept: CALL CENTER | Facility: HOSPITAL | Age: 84
End: 2024-03-18
Payer: MEDICARE

## 2024-03-18 DIAGNOSIS — I10 ESSENTIAL HYPERTENSION: Chronic | ICD-10-CM

## 2024-03-18 RX ORDER — LISINOPRIL 40 MG/1
40 TABLET ORAL
Qty: 90 TABLET | Refills: 1 | Status: SHIPPED | OUTPATIENT
Start: 2024-03-18

## 2024-03-18 RX ORDER — AMLODIPINE BESYLATE 5 MG/1
5 TABLET ORAL DAILY
Qty: 90 TABLET | Refills: 1 | Status: SHIPPED | OUTPATIENT
Start: 2024-03-18

## 2024-03-18 NOTE — TELEPHONE ENCOUNTER
Rx Refill Note  Requested Prescriptions     Pending Prescriptions Disp Refills    lisinopril (PRINIVIL,ZESTRIL) 40 MG tablet [Pharmacy Med Name: Lisinopril 40 MG Oral Tablet] 90 tablet 3     Sig: TAKE 1 TABLET BY MOUTH AT NIGHT    amLODIPine (NORVASC) 5 MG tablet [Pharmacy Med Name: amLODIPine Besylate 5 MG Oral Tablet] 90 tablet 3     Sig: TAKE 1 TABLET BY MOUTH DAILY      Last office visit with prescribing clinician: 11/1/2023   Last telemedicine visit with prescribing clinician: Visit date not found   Next office visit with prescribing clinician: 3/21/2024                         Would you like a call back once the refill request has been completed: [] Yes [] No    If the office needs to give you a call back, can they leave a voicemail: [] Yes [] No    Cat Gutiérrez MA  03/18/24, 09:09 EDT

## 2024-03-18 NOTE — OUTREACH NOTE
Call Center TCM Note      Flowsheet Row Responses   [facility] patient discharged from? Non-  [[facility]]   Does the patient have one of the following disease processes/diagnoses(primary or secondary)? Other   TCM attempt successful? No   Unsuccessful attempts Attempt 1            Marlen Shields RN    3/18/2024, 12:29 EDT

## 2024-03-18 NOTE — TELEPHONE ENCOUNTER
Rx Refill Note  Requested Prescriptions     Pending Prescriptions Disp Refills    lisinopril (PRINIVIL,ZESTRIL) 40 MG tablet [Pharmacy Med Name: Lisinopril 40 MG Oral Tablet] 90 tablet 3     Sig: TAKE 1 TABLET BY MOUTH AT NIGHT    amLODIPine (NORVASC) 5 MG tablet [Pharmacy Med Name: amLODIPine Besylate 5 MG Oral Tablet] 90 tablet 3     Sig: TAKE 1 TABLET BY MOUTH DAILY      Last office visit with prescribing clinician: 11/1/2023   Last telemedicine visit with prescribing clinician: Visit date not found   Next office visit with prescribing clinician: 3/21/2024                         Would you like a call back once the refill request has been completed: [] Yes [] No    If the office needs to give you a call back, can they leave a voicemail: [] Yes [] No    Rosi Velarde MA  03/18/24, 08:23 EDT

## 2024-03-18 NOTE — OUTREACH NOTE
Call Center TCM Note      Flowsheet Row Responses   [facility] patient discharged from? Non-  [[facility]]   Does the patient have one of the following disease processes/diagnoses(primary or secondary)? Other   TCM attempt successful? No   Unsuccessful attempts Attempt 2            Marlen Shields RN    3/18/2024, 16:02 EDT

## 2024-03-19 ENCOUNTER — TRANSITIONAL CARE MANAGEMENT TELEPHONE ENCOUNTER (OUTPATIENT)
Dept: CALL CENTER | Facility: HOSPITAL | Age: 84
End: 2024-03-19
Payer: MEDICARE

## 2024-03-19 NOTE — OUTREACH NOTE
Call Center TCM Note      Flowsheet Row Responses   [facility] patient discharged from? Non-  [[facility]]   Does the patient have one of the following disease processes/diagnoses(primary or secondary)? Other   TCM attempt successful? No   Unsuccessful attempts Attempt 3   Comments HOSP DC FU appt 3/21/24 11 am   Does the patient have an appointment with their PCP within 7-14 days of discharge? Yes            Georgia Crawford RN    3/19/2024, 11:03 EDT

## 2024-03-21 ENCOUNTER — OFFICE VISIT (OUTPATIENT)
Age: 84
End: 2024-03-21
Payer: MEDICARE

## 2024-03-21 ENCOUNTER — LAB (OUTPATIENT)
Age: 84
End: 2024-03-21
Payer: MEDICARE

## 2024-03-21 VITALS
BODY MASS INDEX: 39.88 KG/M2 | HEART RATE: 77 BPM | OXYGEN SATURATION: 98 % | SYSTOLIC BLOOD PRESSURE: 140 MMHG | DIASTOLIC BLOOD PRESSURE: 80 MMHG | HEIGHT: 63 IN

## 2024-03-21 DIAGNOSIS — I10 ESSENTIAL HYPERTENSION: Primary | Chronic | ICD-10-CM

## 2024-03-21 DIAGNOSIS — S72.412D CLOSED DISPLACED FRACTURE OF CONDYLE OF LEFT FEMUR WITH ROUTINE HEALING, SUBSEQUENT ENCOUNTER: ICD-10-CM

## 2024-03-21 DIAGNOSIS — I10 ESSENTIAL HYPERTENSION: Chronic | ICD-10-CM

## 2024-03-21 DIAGNOSIS — Z79.01 ANTICOAGULATED BY ANTICOAGULATION TREATMENT: ICD-10-CM

## 2024-03-21 DIAGNOSIS — I10 ESSENTIAL HYPERTENSION, MALIGNANT: Primary | ICD-10-CM

## 2024-03-21 PROBLEM — S72.412A: Status: ACTIVE | Noted: 2024-03-21

## 2024-03-21 PROCEDURE — 36415 COLL VENOUS BLD VENIPUNCTURE: CPT | Performed by: FAMILY MEDICINE

## 2024-03-21 PROCEDURE — 80048 BASIC METABOLIC PNL TOTAL CA: CPT | Performed by: FAMILY MEDICINE

## 2024-03-21 RX ORDER — ENOXAPARIN SODIUM 100 MG/ML
0.4 INJECTION SUBCUTANEOUS
COMMUNITY
Start: 2024-03-15

## 2024-03-21 RX ORDER — HYDROCODONE BITARTRATE AND ACETAMINOPHEN 5; 325 MG/1; MG/1
1 TABLET ORAL AS NEEDED
COMMUNITY
Start: 2024-03-14

## 2024-03-21 NOTE — PROGRESS NOTES
Transitional Care Follow Up Visit    Mariela Long is a 83 y.o. female who presents for a transitional care management visit.    Within 48 business hours after discharge our office contacted her via telephone to coordinate her care and needs.      I reviewed and discussed the details of that call along with the discharge summary, hospital problems, inpatient lab results, inpatient diagnostic studies, and consultation reports with Mariela.     Current outpatient and discharge medications have been reconciled for the patient.  Reviewed by: Brittany Cantor MD          3/14/2024     1:34 PM   Date of TCM Phone Call   Hospital [facility]   Date of Admission 2/27/2024   Date of Discharge 3/15/2024     Risk for Readmission (LACE) No data recorded  Chief Complaint   Patient presents with    Hospital Follow Up Visit     *[facility] FOLLOW UP, ADMITTED 2/27, DISCHARGED 3/15, DIAGNOSIS FEMUR FRACTURE         History of Present Illness   Course During Hospital Stay:    She presented to outside emergency department on 2/24/2024 with closed fracture of condyle of the femur left after falling at home. She didn't need surgery.       03/21/24: At rehab lisinopril was decreased from 40mg to 5 mg. Potassium was high. When she got home she resumed lisinopril 40mg, maxzide 75-50, spirolactone 25mg, and amlodipine. She is not on lasix or potassium. She didn't take BP medications this morning. Home blood pressure has been 140/80s. She is getting blood thinner shots. Ortho recommended baby aspirin once she is up moving around. She has started walking and putting weight on her left leg. Starting outpatient PT. She has had 2 pills of hydrocodone.     The following portions of the patient's history were reviewed and updated as appropriate: allergies, current medications, past family history, past medical history, past social history, past surgical history, and problem list.    Vitals:    03/21/24 1046   BP:  "140/80   Pulse: 77   SpO2: 98%   Weight: Comment: unable to weigh   Height: 160 cm (62.99\")   PainSc: 0-No pain     Body mass index is 39.88 kg/m².          Physical Exam  Vitals reviewed.   Constitutional:       General: She is not in acute distress.     Appearance: She is obese. She is not ill-appearing.      Comments: Seated in wheelchair   Cardiovascular:      Rate and Rhythm: Normal rate and regular rhythm.   Pulmonary:      Effort: Pulmonary effort is normal.      Breath sounds: Normal breath sounds.   Musculoskeletal:      Comments: Left leg brace   Neurological:      Mental Status: She is alert.         Result Review :            REHAB - [facility] 2/24/24-3/15/24 (03/15/2024)     Assessment and Plan    Diagnoses and all orders for this visit:    1. Essential hypertension (Primary)  -     Basic Metabolic Panel; Future  Blood pressure medications adjusted at rehab but now resumed previous medications.  She has not had her blood pressure medication today and her blood pressure is mildly elevated.  Recommend periodic home blood pressure monitoring.  Continue amlodipine 5 mg daily, lisinopril 40 mg daily, spironolactone 25 mg daily and triamterene hydrochlorothiazide 75-50 mg daily.  I would like her to notify my office if she experiences hypotension or alternatively if her blood pressure becomes elevated and symptomatic.  Plan for next follow-up in May.  She also had acute kidney injury and electrolyte disturbances related to diuretics and I am checking labs today.  2. Anticoagulated by anticoagulation treatment  Currently on Lovenox until she is more mobile and then transitioning to aspirin.  3. Closed displaced fracture of condyle of left femur with routine healing, subsequent encounter  She has hydrocodone/acetaminophen 5-3 25 as needed for pain.  Murtaza report reviewed.  Advised if she needs a refill of the medication I will send it to the pharmacy as part of her healing.      Return for Office " visit, as scheduled.    Electronically signed by Brittany Cantor MD, 03/21/24, 11:09 AM EDT.

## 2024-03-22 ENCOUNTER — TELEPHONE (OUTPATIENT)
Age: 84
End: 2024-03-22
Payer: MEDICARE

## 2024-03-22 LAB
ANION GAP SERPL CALCULATED.3IONS-SCNC: 9.5 MMOL/L (ref 5–15)
BUN SERPL-MCNC: 16 MG/DL (ref 8–23)
BUN/CREAT SERPL: 21.9 (ref 7–25)
CALCIUM SPEC-SCNC: 9.4 MG/DL (ref 8.6–10.5)
CHLORIDE SERPL-SCNC: 107 MMOL/L (ref 98–107)
CO2 SERPL-SCNC: 25.5 MMOL/L (ref 22–29)
CREAT SERPL-MCNC: 0.73 MG/DL (ref 0.57–1)
EGFRCR SERPLBLD CKD-EPI 2021: 82.7 ML/MIN/1.73
GLUCOSE SERPL-MCNC: 95 MG/DL (ref 65–99)
POTASSIUM SERPL-SCNC: 4.2 MMOL/L (ref 3.5–5.2)
SODIUM SERPL-SCNC: 142 MMOL/L (ref 136–145)

## 2024-03-22 NOTE — TELEPHONE ENCOUNTER
Called pt, left voicemail to call back office (office number given)     OK FOR HUB TO RELAY MESSAGE  ----- Message from Brittany Cantor MD sent at 3/22/2024  7:40 AM EDT -----  Normal electrolytes and kidney function

## 2024-03-25 NOTE — TELEPHONE ENCOUNTER
Name: RHNODA ALVES      Relationship:       Best Callback Number: [phone]      HUB PROVIDED THE RELAY MESSAGE FROM THE OFFICE      PATIENT: VOICED UNDERSTANDING AND HAS NO FURTHER QUESTIONS AT THIS TIME    ADDITIONAL INFORMATION:

## 2024-03-27 RX ORDER — ENOXAPARIN SODIUM 100 MG/ML
40 INJECTION SUBCUTANEOUS
Qty: 12 ML | Refills: 1 | Status: SHIPPED | OUTPATIENT
Start: 2024-03-27

## 2024-03-27 NOTE — TELEPHONE ENCOUNTER
DELETE AFTER REVIEWING: Send the encounter HIGH priority, If patient has less than a 3 day supply. If the patient will run out of medication over the weekend add that information to the additional details line. Send this encounter to the clinical pool.    Caller: JUAN HERNÁNDEZ    Relationship: Emergency Contact    Best call back number: [phone]     Requested Prescriptions:   Requested Prescriptions     Pending Prescriptions Disp Refills    Enoxaparin Sodium (LOVENOX) 40 MG/0.4ML solution prefilled syringe syringe       Sig: Inject 0.4 mL under the skin into the appropriate area as directed Daily. 14 days        Pharmacy where request should be sent: [address] [phone] [place] [phone] FX     Last office visit with prescribing clinician: 3/21/2024   Last telemedicine visit with prescribing clinician: Visit date not found   Next office visit with prescribing clinician: 5/1/2024     Additional details provided by patient:     Does the patient have less than a 3 day supply:  [x] Yes  [] No    Would you like a call back once the refill request has been completed: [] Yes [x] No    If the office needs to give you a call back, can they leave a voicemail: [] Yes [] No    Stephanie Celis Rep   03/27/24 10:38 EDT

## 2024-03-27 NOTE — TELEPHONE ENCOUNTER
Rx Refill Note  Requested Prescriptions     Pending Prescriptions Disp Refills    Enoxaparin Sodium (LOVENOX) 40 MG/0.4ML solution prefilled syringe syringe       Sig: Inject 0.4 mL under the skin into the appropriate area as directed Daily. 14 days      Last office visit with prescribing clinician: 3/21/2024   Next office visit with prescribing clinician: 5/1/2024   Paulina Lackey MA  03/27/24, 10:42 EDT    Last fill: 03/15/2024-historical provider

## 2024-04-24 ENCOUNTER — TELEPHONE (OUTPATIENT)
Age: 84
End: 2024-04-24
Payer: MEDICARE

## 2024-04-24 NOTE — TELEPHONE ENCOUNTER
CALLED AND SPOKE WITH PATIENT'S DAUGHTER. ADVISED HER PER PCP TO GO TO EMERGENCY DEPARTMENT. DAUGHTER VOICED UNDERSTANDING.

## 2024-04-27 ENCOUNTER — READMISSION MANAGEMENT (OUTPATIENT)
Dept: CALL CENTER | Facility: HOSPITAL | Age: 84
End: 2024-04-27
Payer: MEDICARE

## 2024-04-28 DIAGNOSIS — K21.9 GASTROESOPHAGEAL REFLUX DISEASE, UNSPECIFIED WHETHER ESOPHAGITIS PRESENT: ICD-10-CM

## 2024-04-28 NOTE — OUTREACH NOTE
Prep Survey      Flowsheet Row Responses   [facility] patient discharged from? Non-[facility]   Is LACE score < 7 ? Non-BH Discharge   Eligibility [facility]   Hospital [facility]   Date of Admission 04/25/24   Date of Discharge 04/27/24   Discharge diagnosis Acute ischemic stroke (HCC) (Primary Dx)   Does the patient have one of the following disease processes/diagnoses(primary or secondary)? Stroke   Prep survey completed? Yes            GERALDINE ROSALES - Registered Nurse

## 2024-04-29 ENCOUNTER — TRANSITIONAL CARE MANAGEMENT TELEPHONE ENCOUNTER (OUTPATIENT)
Dept: CALL CENTER | Facility: HOSPITAL | Age: 84
End: 2024-04-29
Payer: MEDICARE

## 2024-04-29 RX ORDER — OMEPRAZOLE 20 MG/1
20 CAPSULE, DELAYED RELEASE ORAL DAILY
Qty: 90 CAPSULE | Refills: 3 | Status: SHIPPED | OUTPATIENT
Start: 2024-04-29

## 2024-04-29 NOTE — TELEPHONE ENCOUNTER
Rx Refill Note  Requested Prescriptions     Pending Prescriptions Disp Refills    omeprazole (priLOSEC) 20 MG capsule [Pharmacy Med Name: Omeprazole 20 MG Oral Capsule Delayed Release] 90 capsule 3     Sig: TAKE 1 CAPSULE BY MOUTH DAILY      Last office visit with prescribing clinician: 3/21/2024   Last telemedicine visit with prescribing clinician: Visit date not found   Next office visit with prescribing clinician: 5/3/2024                         Would you like a call back once the refill request has been completed: [] Yes [] No    If the office needs to give you a call back, can they leave a voicemail: [] Yes [] No    Rosi Velarde MA  04/29/24, 08:58 EDT

## 2024-04-29 NOTE — OUTREACH NOTE
Call Center TCM Note      Flowsheet Row Responses   [facility] facility patient discharged from? Non-BH   Does the patient have one of the following disease processes/diagnoses(primary or secondary)? Stroke   TCM attempt successful? No   Unsuccessful attempts Attempt 2            Coty Snow RN    4/29/2024, 15:13 EDT

## 2024-04-30 ENCOUNTER — TRANSITIONAL CARE MANAGEMENT TELEPHONE ENCOUNTER (OUTPATIENT)
Dept: CALL CENTER | Facility: HOSPITAL | Age: 84
End: 2024-04-30
Payer: MEDICARE

## 2024-04-30 NOTE — OUTREACH NOTE
Call Center TCM Note      Flowsheet Row Responses   [facility] patient discharged from? Non-[facility]   Does the patient have one of the following disease processes/diagnoses(primary or secondary)? Stroke   TCM attempt successful? No   Unsuccessful attempts Attempt 3            Debbi Quick RN    4/30/2024, 08:47 EDT

## 2024-05-03 ENCOUNTER — OFFICE VISIT (OUTPATIENT)
Age: 84
End: 2024-05-03
Payer: MEDICARE

## 2024-05-03 VITALS
WEIGHT: 221 LBS | SYSTOLIC BLOOD PRESSURE: 130 MMHG | DIASTOLIC BLOOD PRESSURE: 72 MMHG | HEIGHT: 63 IN | BODY MASS INDEX: 39.16 KG/M2

## 2024-05-03 DIAGNOSIS — Z86.73 H/O: STROKE: ICD-10-CM

## 2024-05-03 DIAGNOSIS — I10 ESSENTIAL HYPERTENSION: Primary | Chronic | ICD-10-CM

## 2024-05-03 DIAGNOSIS — R35.0 URINARY FREQUENCY: ICD-10-CM

## 2024-05-03 LAB
BILIRUB BLD-MCNC: NEGATIVE MG/DL
CLARITY, POC: CLEAR
COLOR UR: YELLOW
EXPIRATION DATE: ABNORMAL
GLUCOSE UR STRIP-MCNC: NEGATIVE MG/DL
KETONES UR QL: NEGATIVE
LEUKOCYTE EST, POC: NEGATIVE
Lab: ABNORMAL
NITRITE UR-MCNC: NEGATIVE MG/ML
PH UR: 6 [PH] (ref 5–8)
PROT UR STRIP-MCNC: NEGATIVE MG/DL
RBC # UR STRIP: ABNORMAL /UL
SP GR UR: 1.01 (ref 1–1.03)
UROBILINOGEN UR QL: NORMAL

## 2024-05-03 RX ORDER — ATORVASTATIN CALCIUM 40 MG/1
40 TABLET, FILM COATED ORAL DAILY
COMMUNITY
Start: 2024-04-27 | End: 2025-04-27

## 2024-05-03 RX ORDER — CLONIDINE HYDROCHLORIDE 0.1 MG/1
0.1 TABLET ORAL 2 TIMES DAILY PRN
COMMUNITY
Start: 2024-04-27 | End: 2025-04-27

## 2024-05-03 RX ORDER — ASPIRIN 81 MG/1
81 TABLET, CHEWABLE ORAL DAILY
COMMUNITY
Start: 2024-04-27 | End: 2025-04-27

## 2024-05-03 NOTE — PROGRESS NOTES
"Transitional Care Follow Up Visit    Mariela Long is a 83 y.o. female who presents for a transitional care management visit.    Within 48 business hours after discharge our office contacted her via telephone to coordinate her care and needs.      I reviewed and discussed the details of that call along with the discharge summary, hospital problems, inpatient lab results, inpatient diagnostic studies, and consultation reports with Mariela.     Current outpatient and discharge medications have been reconciled for the patient.  Reviewed by: Brittany Cantor MD          4/27/2024    10:22 PM   Date of TCM Phone Call   Hospital [facility]   Date of Admission 4/25/2024   Date of Discharge 4/27/2024     Risk for Readmission (LACE) No data recorded  Chief Complaint   Patient presents with    Transitional Care Management    Difficulty Urinating    Urinary Frequency         History of Present Illness   Course During Hospital Stay:    Discharge Diagnoses:  Acute CVA, she was started on aspirin and statin and is to f/u with Neurology in 2-4 weeks and follow up with Opthalmology to check visual fields  H/o hypertension, the patient's spironolactone and triamterene-hctz were discontinued    HPI forwarded from H&P:  Mariela Long 83 year old female with a past medical history significant for hypertension, aortic aneurysm, and broken left femur presented to [facility] Emergency Department complaining of dizziness, bilateral arm numbness with some right-sided deficits since Sunday.  Patient was seen by her optometrist today and sent for evaluation of suspected stroke.  Patient is alert and oriented x 3, GCS 15.  Patient reports that right arm \"just is not feeling right.\"  Family reports that patient did have trouble grasping things on Sunday but did not have slurred speech or abnormal smile.  Patient is currently pain-free.  Alert and oriented x 3, GCS 15 and has right homonymous hemianopsia. CT brain showing subacute / chronic appearing " "infarct in left parieto-occipital region and chronic microvascular ischemia. Lab work is unremarkable. Patient to be admitted for MR brain and Neurology consult in the morning.    Hospital Course:    Acute CVA  Expressive aphasia  Visual disturbance  -CT brain showing subacute / chronic appearing infarct in left parieto-occipital region and chronic microvascular ischemia  -CTA brain neck shows no acute vascular abnormality or stenosis  -mri brain 4/26/24: Multiple acute infarcts involving the posterior left frontal, parietal,   temporal and occipital lobes.   -echo 4/25/24: lvef 65%, no right to left shunt  -LDL 91, b12 normal, folate >24, a1c 5.7, tsh 5.6, check free t4  -By the day of discharge all of the patient's symptoms had resolved except her visual disturbance  -Neurology saw the patient and made the following recommendations:  Aspirin 81 mg and statin therapy  We will consider utility of 30-day event monitor postdischarge  Blood pressure goals at discharge of -160  She will continue physical therapy at home as currently scheduled     H/o hypertension  -at discharge her lisinopril and amlodipine were resumed. Spironolactone and triamterene-hctz were discontinued    Recently diagnosed UTI  -she is to complete 3 days of levofloxacin 250mg daily through 4/28/28     Recent left femur fracture  -fracture on 2/24 after mechanical fall; is on lovenox 40mg BID per orthopedic surgery and has been receiving outpatient pt     htn     gerd        05/03/24: She has constant urge to urinate and pressure. She urinates 3-4 times an hour. No dysuria. Vision is still \"messed up.\" She can't see on right side. She is not as stable and it throws her off. She has upcoming eye exam. She has gotten better since she got home with articulation. Thought process is better. Struggles with remote control figuring out which buttons to push. Lovenox has completed, she thinks supposed to take aspirin 81mg twice a day.     The " "following portions of the patient's history were reviewed and updated as appropriate: allergies, current medications, past family history, past medical history, past social history, past surgical history, and problem list.    Vitals:    05/03/24 1137   BP: 130/72   Weight: 100 kg (221 lb)   Height: 160 cm (62.99\")   PainSc: 0-No pain     Body mass index is 39.16 kg/m².          Physical Exam  Vitals reviewed.   Constitutional:       General: She is not in acute distress.     Appearance: She is obese. She is not ill-appearing, toxic-appearing or diaphoretic.   Cardiovascular:      Rate and Rhythm: Normal rate and regular rhythm.   Pulmonary:      Effort: Pulmonary effort is normal.      Breath sounds: Normal breath sounds.   Neurological:      Mental Status: She is alert.      Gait: Gait abnormal (slow, with cane).   Psychiatric:         Attention and Perception: Attention normal.         Mood and Affect: Mood normal.         Speech: She is communicative. Speech is not rapid and pressured, delayed or slurred.         Behavior: Behavior normal. Behavior is cooperative.         Judgment: Judgment normal.         Result Review :   The following data was reviewed by: Brittany Cantor MD on 05/03/2024:           Results for orders placed or performed in visit on 05/03/24   POC Urinalysis Dipstick, Automated    Specimen: Urine   Result Value Ref Range    Color Yellow Yellow, Straw, Dark Yellow, Rhonda    Clarity, UA Clear Clear    Specific Gravity  1.010 1.005 - 1.030    pH, Urine 6.0 5.0 - 8.0    Leukocytes Negative Negative    Nitrite, UA Negative Negative    Protein, POC Negative Negative mg/dL    Glucose, UA Negative Negative mg/dL    Ketones, UA Negative Negative    Urobilinogen, UA Normal Normal, 0.2 E.U./dL    Bilirubin Negative Negative    Blood, UA Trace (A) Negative    Lot Number [id number]     Expiration Date 01/14/2025          Assessment and Plan    Diagnoses and all orders for this visit:    1. Essential " hypertension (Primary)  -     Ambulatory Referral to Cardiology  Stable.  Current medications are amlodipine 5 mg daily and lisinopril 40 mg daily.  Recheck in 6 months.  Family request to see cardiology at outside clinic.  2. H/O: stroke  Instructed to take aspirin 81 mg once a day.  Take high intensity statin atorvastatin 40 mg nightly.  Blood pressure control as above.  Keep appointment as scheduled with follow-up neurology.  3. Urinary frequency  -     POC Urinalysis Dipstick, Automated  No need for antibiotics at this time.  Likely overlap with overactive bladder symptoms.      Return in about 6 months (around 11/4/2024) for MWV and fasting labs, as scheduled.    Electronically signed by Brittany Cantor MD, 05/03/24, 12:03 PM EDT.

## 2024-06-07 NOTE — TELEPHONE ENCOUNTER
Caller: JUAN HERNÁNDEZ    Relationship: Emergency Contact    Best call back number: [phone]     Requested Prescriptions:   Requested Prescriptions     Pending Prescriptions Disp Refills    atorvastatin (LIPITOR) 40 MG tablet 30 tablet 11     Sig: Take 1 tablet by mouth Daily.        Pharmacy where request should be sent: [facility] - [place] [id number] [facility] [phone] [facility] [phone]      Last office visit with prescribing clinician: 5/3/2024   Last telemedicine visit with prescribing clinician: Visit date not found   Next office visit with prescribing clinician: 11/4/2024     Additional details provided by patient: A WEEK OF MEDICATION ON HAND     Does the patient have less than a 3 day supply:  [] Yes  [x] No    Would you like a call back once the refill request has been completed: [] Yes [] No    If the office needs to give you a call back, can they leave a voicemail: [] Yes [] No    Stephanie Garcia   06/07/24 10:17 EDT

## 2024-06-07 NOTE — TELEPHONE ENCOUNTER
Rx Refill Note  Requested Prescriptions     Pending Prescriptions Disp Refills    atorvastatin (LIPITOR) 40 MG tablet 30 tablet 11     Sig: Take 1 tablet by mouth Daily.      Last office visit with prescribing clinician: 5/3/2024   Last telemedicine visit with prescribing clinician: Visit date not found   Next office visit with prescribing clinician: 11/4/2024                         Would you like a call back once the refill request has been completed: [] Yes [] No    If the office needs to give you a call back, can they leave a voicemail: [] Yes [] No    Keila Briggs MA  06/07/24, 10:31 EDT

## 2024-06-10 RX ORDER — ATORVASTATIN CALCIUM 40 MG/1
40 TABLET, FILM COATED ORAL DAILY
Qty: 90 TABLET | Refills: 1 | Status: SHIPPED | OUTPATIENT
Start: 2024-06-10 | End: 2025-06-10

## 2024-08-01 NOTE — TELEPHONE ENCOUNTER
She went to urgent care Mon for UTI and has been on anbiotics. Now she is disoriented and not making sense. She's carrying on conversations then talks about something else and just stops. She's has sepsis before from a uti so they aren't sure if it could be that.    Spoke with Daiana conteh's daughter [phone] she said that they noticed her being disoriented over the weekend so took her to [facility] walk-in clinic on Monday and she did have UTI, she said the disoriented goes in and got but this morning while taking her to physical therapy for her broken leg she said that she was having a hard time changing the channel on the TV using the remote control    Additional Safety/Bands:

## 2024-09-09 ENCOUNTER — PATIENT OUTREACH (OUTPATIENT)
Age: 84
End: 2024-09-09
Payer: MEDICARE

## 2024-09-09 NOTE — OUTREACH NOTE
SW sent SDOH survey via [employer].    [facility] ONEILL -   Ambulatory Case Management    9/9/2024, 11:13 EDT

## 2024-09-18 ENCOUNTER — PATIENT OUTREACH (OUTPATIENT)
Age: 84
End: 2024-09-18
Payer: MEDICARE

## 2024-10-02 ENCOUNTER — PATIENT OUTREACH (OUTPATIENT)
Age: 84
End: 2024-10-02
Payer: MEDICARE

## 2024-10-02 NOTE — OUTREACH NOTE
SW made three attempts to contact pt for follow up. SW left voicemail messages with contact information. SW will close referral but will re-open should pt call back.  Madison ONEILL -   Ambulatory Case Management    10/2/2024, 14:24 EDT

## 2024-11-03 DIAGNOSIS — I10 ESSENTIAL HYPERTENSION: Chronic | ICD-10-CM

## 2024-11-04 ENCOUNTER — LAB (OUTPATIENT)
Age: 84
End: 2024-11-04
Payer: MEDICARE

## 2024-11-04 ENCOUNTER — OFFICE VISIT (OUTPATIENT)
Age: 84
End: 2024-11-04
Payer: MEDICARE

## 2024-11-04 VITALS
SYSTOLIC BLOOD PRESSURE: 126 MMHG | HEIGHT: 61 IN | WEIGHT: 221.56 LBS | RESPIRATION RATE: 18 BRPM | DIASTOLIC BLOOD PRESSURE: 80 MMHG | BODY MASS INDEX: 41.83 KG/M2 | OXYGEN SATURATION: 98 % | HEART RATE: 70 BPM

## 2024-11-04 DIAGNOSIS — E87.6 HYPOKALEMIA: ICD-10-CM

## 2024-11-04 DIAGNOSIS — Z86.73 H/O: STROKE: ICD-10-CM

## 2024-11-04 DIAGNOSIS — R73.03 PREDIABETES: Chronic | ICD-10-CM

## 2024-11-04 DIAGNOSIS — E66.813 CLASS 3 SEVERE OBESITY DUE TO EXCESS CALORIES WITH SERIOUS COMORBIDITY AND BODY MASS INDEX (BMI) OF 40.0 TO 44.9 IN ADULT: ICD-10-CM

## 2024-11-04 DIAGNOSIS — I10 ESSENTIAL HYPERTENSION: Chronic | ICD-10-CM

## 2024-11-04 DIAGNOSIS — E03.8 SUBCLINICAL HYPOTHYROIDISM: ICD-10-CM

## 2024-11-04 DIAGNOSIS — N76.0 VULVOVAGINITIS: ICD-10-CM

## 2024-11-04 DIAGNOSIS — Z13.0 SCREENING FOR DEFICIENCY ANEMIA: ICD-10-CM

## 2024-11-04 DIAGNOSIS — Z23 IMMUNIZATION DUE: ICD-10-CM

## 2024-11-04 DIAGNOSIS — M85.89 OSTEOPENIA OF MULTIPLE SITES: ICD-10-CM

## 2024-11-04 DIAGNOSIS — Z00.00 MEDICARE ANNUAL WELLNESS VISIT, SUBSEQUENT: ICD-10-CM

## 2024-11-04 DIAGNOSIS — K21.9 GASTROESOPHAGEAL REFLUX DISEASE, UNSPECIFIED WHETHER ESOPHAGITIS PRESENT: Chronic | ICD-10-CM

## 2024-11-04 DIAGNOSIS — Z91.81 AT HIGH RISK FOR FALLS: ICD-10-CM

## 2024-11-04 DIAGNOSIS — J45.40 MODERATE PERSISTENT ASTHMA WITHOUT COMPLICATION: Chronic | ICD-10-CM

## 2024-11-04 DIAGNOSIS — R73.03 PREDIABETES: Primary | Chronic | ICD-10-CM

## 2024-11-04 DIAGNOSIS — J30.1 SEASONAL ALLERGIC RHINITIS DUE TO POLLEN: ICD-10-CM

## 2024-11-04 DIAGNOSIS — E66.01 CLASS 3 SEVERE OBESITY DUE TO EXCESS CALORIES WITH SERIOUS COMORBIDITY AND BODY MASS INDEX (BMI) OF 40.0 TO 44.9 IN ADULT: ICD-10-CM

## 2024-11-04 DIAGNOSIS — N39.0 ACUTE UTI: ICD-10-CM

## 2024-11-04 LAB
ALBUMIN SERPL-MCNC: 4.2 G/DL (ref 3.5–5.2)
ALBUMIN/GLOB SERPL: 1.3 G/DL
ALP SERPL-CCNC: 109 U/L (ref 39–117)
ALT SERPL W P-5'-P-CCNC: 16 U/L (ref 1–33)
ANION GAP SERPL CALCULATED.3IONS-SCNC: 9 MMOL/L (ref 5–15)
AST SERPL-CCNC: 18 U/L (ref 1–32)
BILIRUB BLD-MCNC: ABNORMAL MG/DL
BILIRUB SERPL-MCNC: 0.5 MG/DL (ref 0–1.2)
BUN SERPL-MCNC: 23 MG/DL (ref 8–23)
BUN/CREAT SERPL: 27.4 (ref 7–25)
CALCIUM SPEC-SCNC: 9.2 MG/DL (ref 8.6–10.5)
CHLORIDE SERPL-SCNC: 102 MMOL/L (ref 98–107)
CHOLEST SERPL-MCNC: 137 MG/DL (ref 0–200)
CLARITY, POC: ABNORMAL
CO2 SERPL-SCNC: 27 MMOL/L (ref 22–29)
COLOR UR: YELLOW
CREAT SERPL-MCNC: 0.84 MG/DL (ref 0.57–1)
DEPRECATED RDW RBC AUTO: 45.3 FL (ref 37–54)
EGFRCR SERPLBLD CKD-EPI 2021: 69.5 ML/MIN/1.73
ERYTHROCYTE [DISTWIDTH] IN BLOOD BY AUTOMATED COUNT: 13.6 % (ref 12.3–15.4)
EXPIRATION DATE: ABNORMAL
GLOBULIN UR ELPH-MCNC: 3.2 GM/DL
GLUCOSE SERPL-MCNC: 87 MG/DL (ref 65–99)
GLUCOSE UR STRIP-MCNC: NEGATIVE MG/DL
HBA1C MFR BLD: 5.7 % (ref 4.8–5.6)
HCT VFR BLD AUTO: 36.9 % (ref 34–46.6)
HDLC SERPL-MCNC: 52 MG/DL (ref 40–60)
HGB BLD-MCNC: 12.1 G/DL (ref 12–15.9)
KETONES UR QL: NEGATIVE
LDLC SERPL CALC-MCNC: 70 MG/DL (ref 0–100)
LDLC/HDLC SERPL: 1.34 {RATIO}
LEUKOCYTE EST, POC: ABNORMAL
Lab: ABNORMAL
MCH RBC QN AUTO: 29.9 PG (ref 26.6–33)
MCHC RBC AUTO-ENTMCNC: 32.8 G/DL (ref 31.5–35.7)
MCV RBC AUTO: 91.1 FL (ref 79–97)
NITRITE UR-MCNC: POSITIVE MG/ML
PH UR: 6 [PH] (ref 5–8)
PLATELET # BLD AUTO: 282 10*3/MM3 (ref 140–450)
PMV BLD AUTO: 12 FL (ref 6–12)
POTASSIUM SERPL-SCNC: 4.1 MMOL/L (ref 3.5–5.2)
PROT SERPL-MCNC: 7.4 G/DL (ref 6–8.5)
PROT UR STRIP-MCNC: NEGATIVE MG/DL
RBC # BLD AUTO: 4.05 10*6/MM3 (ref 3.77–5.28)
RBC # UR STRIP: ABNORMAL /UL
SODIUM SERPL-SCNC: 138 MMOL/L (ref 136–145)
SP GR UR: 1.01 (ref 1–1.03)
T4 FREE SERPL-MCNC: 1.5 NG/DL (ref 0.92–1.68)
TRIGL SERPL-MCNC: 76 MG/DL (ref 0–150)
TSH SERPL DL<=0.05 MIU/L-ACNC: 3.65 UIU/ML (ref 0.27–4.2)
UROBILINOGEN UR QL: ABNORMAL
VLDLC SERPL-MCNC: 15 MG/DL (ref 5–40)
WBC NRBC COR # BLD AUTO: 7.83 10*3/MM3 (ref 3.4–10.8)

## 2024-11-04 PROCEDURE — 80053 COMPREHEN METABOLIC PANEL: CPT | Performed by: FAMILY MEDICINE

## 2024-11-04 PROCEDURE — 81003 URINALYSIS AUTO W/O SCOPE: CPT | Performed by: FAMILY MEDICINE

## 2024-11-04 PROCEDURE — 1160F RVW MEDS BY RX/DR IN RCRD: CPT | Performed by: FAMILY MEDICINE

## 2024-11-04 PROCEDURE — 3079F DIAST BP 80-89 MM HG: CPT | Performed by: FAMILY MEDICINE

## 2024-11-04 PROCEDURE — 87086 URINE CULTURE/COLONY COUNT: CPT | Performed by: FAMILY MEDICINE

## 2024-11-04 PROCEDURE — G0439 PPPS, SUBSEQ VISIT: HCPCS | Performed by: FAMILY MEDICINE

## 2024-11-04 PROCEDURE — 99214 OFFICE O/P EST MOD 30 MIN: CPT | Performed by: FAMILY MEDICINE

## 2024-11-04 PROCEDURE — 90662 IIV NO PRSV INCREASED AG IM: CPT | Performed by: FAMILY MEDICINE

## 2024-11-04 PROCEDURE — 80061 LIPID PANEL: CPT | Performed by: FAMILY MEDICINE

## 2024-11-04 PROCEDURE — 85027 COMPLETE CBC AUTOMATED: CPT | Performed by: FAMILY MEDICINE

## 2024-11-04 PROCEDURE — 83036 HEMOGLOBIN GLYCOSYLATED A1C: CPT | Performed by: FAMILY MEDICINE

## 2024-11-04 PROCEDURE — 87077 CULTURE AEROBIC IDENTIFY: CPT | Performed by: FAMILY MEDICINE

## 2024-11-04 PROCEDURE — 36415 COLL VENOUS BLD VENIPUNCTURE: CPT | Performed by: FAMILY MEDICINE

## 2024-11-04 PROCEDURE — G0008 ADMIN INFLUENZA VIRUS VAC: HCPCS | Performed by: FAMILY MEDICINE

## 2024-11-04 PROCEDURE — 84439 ASSAY OF FREE THYROXINE: CPT | Performed by: FAMILY MEDICINE

## 2024-11-04 PROCEDURE — 1170F FXNL STATUS ASSESSED: CPT | Performed by: FAMILY MEDICINE

## 2024-11-04 PROCEDURE — 1126F AMNT PAIN NOTED NONE PRSNT: CPT | Performed by: FAMILY MEDICINE

## 2024-11-04 PROCEDURE — 84443 ASSAY THYROID STIM HORMONE: CPT | Performed by: FAMILY MEDICINE

## 2024-11-04 PROCEDURE — 87186 SC STD MICRODIL/AGAR DIL: CPT | Performed by: FAMILY MEDICINE

## 2024-11-04 PROCEDURE — 1159F MED LIST DOCD IN RCRD: CPT | Performed by: FAMILY MEDICINE

## 2024-11-04 PROCEDURE — 3074F SYST BP LT 130 MM HG: CPT | Performed by: FAMILY MEDICINE

## 2024-11-04 RX ORDER — AMLODIPINE BESYLATE 5 MG/1
5 TABLET ORAL DAILY
Qty: 90 TABLET | Refills: 3 | Status: SHIPPED | OUTPATIENT
Start: 2024-11-04

## 2024-11-04 RX ORDER — ALENDRONATE SODIUM 35 MG/1
35 TABLET ORAL
Qty: 12 TABLET | Refills: 3 | Status: SHIPPED | OUTPATIENT
Start: 2024-11-04

## 2024-11-04 RX ORDER — METOPROLOL TARTRATE 25 MG/1
1 TABLET, FILM COATED ORAL 2 TIMES DAILY
COMMUNITY
Start: 2024-08-26

## 2024-11-04 RX ORDER — ALBUTEROL SULFATE 90 UG/1
2 INHALANT RESPIRATORY (INHALATION) EVERY 4 HOURS PRN
Qty: 18 G | Refills: 3 | Status: SHIPPED | OUTPATIENT
Start: 2024-11-04

## 2024-11-04 RX ORDER — CLOTRIMAZOLE 1 %
1 CREAM (GRAM) TOPICAL 2 TIMES DAILY
Qty: 113 G | Refills: 2 | Status: SHIPPED | OUTPATIENT
Start: 2024-11-04

## 2024-11-04 RX ORDER — NITROFURANTOIN 25; 75 MG/1; MG/1
100 CAPSULE ORAL EVERY 12 HOURS SCHEDULED
Qty: 10 CAPSULE | Refills: 0 | Status: SHIPPED | OUTPATIENT
Start: 2024-11-04 | End: 2024-11-09

## 2024-11-04 RX ORDER — AMIODARONE HYDROCHLORIDE 200 MG/1
200 TABLET ORAL DAILY
COMMUNITY
Start: 2024-08-05 | End: 2025-08-05

## 2024-11-04 NOTE — PROGRESS NOTES
Subjective   The ABCs of the Annual Wellness Visit  Medicare Wellness Visit      Mariela Long is a 84 y.o. patient who presents for a Medicare Wellness Visit.    The following portions of the patient's history were reviewed and   updated as appropriate: allergies, current medications, past family history, past medical history, past social history, past surgical history, and problem list.    Compared to one year ago, the patient's physical   health is worse.  Compared to one year ago, the patient's mental   health is worse.    Recent Hospitalizations:  She was admitted within the past 365 days at [facility].     Current Medical Providers:  Patient Care Team:  Brittany Cantor MD as PCP - General (Family Medicine)  Cristi Montana MD as Consulting Physician (Urology)  Love Valdez MD as Consulting Physician (Cardiology)  Cristi Chen MD (Psychiatry)    Outpatient Medications Prior to Visit   Medication Sig Dispense Refill    amiodarone (PACERONE) 200 MG tablet Take 1 tablet by mouth Daily.      amLODIPine (NORVASC) 5 MG tablet TAKE 1 TABLET BY MOUTH DAILY 90 tablet 3    atorvastatin (LIPITOR) 40 MG tablet Take 1 tablet by mouth Daily. 90 tablet 1    cloNIDine (CATAPRES) 0.1 MG tablet Take 1 tablet by mouth 2 (Two) Times a Day As Needed for High Blood Pressure. Take 1 tablet (0.1 mg total) by mouth 2 (two) times daily as needed (sbp>160mmHg) **Look-alike/Sound-alike medication      furosemide (LASIX) 40 MG tablet Take 1 tablet by mouth Daily.      HYDROcodone-acetaminophen (NORCO) 5-325 MG per tablet Take 1 tablet by mouth As Needed for Moderate Pain.      lisinopril (PRINIVIL,ZESTRIL) 40 MG tablet TAKE 1 TABLET BY MOUTH AT NIGHT 90 tablet 1    loratadine (CLARITIN) 10 MG tablet Take 1 tablet by mouth Daily.      metoprolol tartrate (LOPRESSOR) 25 MG tablet Take 1 tablet by mouth 2 (Two) Times a Day.      multivitamin with minerals tablet tablet Take 1 tablet by mouth  Daily.      oxybutynin XL (DITROPAN XL) 15 MG 24 hr tablet oxybutynin chloride ER 15 mg tablet,extended release 24 hr   Take 1 tablet by mouth once daily      potassium chloride (K-DUR,KLOR-CON) 20 MEQ CR tablet Take 1 tablet by mouth Every Morning. 90 tablet 3    rivaroxaban (XARELTO) 20 MG tablet Take 1 tablet by mouth 2 (Two) Times a Day With Meals.      albuterol (ACCUNEB) 0.63 MG/3ML nebulizer solution Take 3 mL by nebulization Every 6 (Six) Hours As Needed for Wheezing. 24 vial 1    albuterol sulfate  (90 Base) MCG/ACT inhaler Inhale 2 puffs Every 4 (Four) Hours As Needed for Wheezing. 18 g 3    alendronate (FOSAMAX) 35 MG tablet TAKE 1 TABLET BY MOUTH EVERY 7  DAYS 12 tablet 3    aspirin 81 MG chewable tablet Chew 1 tablet Daily.      Fluticasone Furoate-Vilanterol (BREO ELLIPTA) 200-25 MCG/ACT inhaler Inhale 1 puff Daily. 3 each 3    Fluticasone Furoate-Vilanterol (BREO ELLIPTA) 200-25 MCG/INH inhaler Inhale 1 puff Daily. 3 each 3    montelukast (SINGULAIR) 10 MG tablet Take 1 tablet by mouth Every Night. 90 tablet 3    omeprazole (priLOSEC) 20 MG capsule TAKE 1 CAPSULE BY MOUTH DAILY 90 capsule 3     No facility-administered medications prior to visit.     Opioid medication/s are on active medication list.  and I have evaluated her active treatment plan and pain score trends (see table).  Vitals:    11/04/24 1154   PainSc: 0-No pain     I have reviewed the chart for potential of high risk medication and harmful drug interactions in the elderly.        Aspirin is on active medication list. Aspirin use is contraindicated for this patient due to: current use of Xarelto.  Patient has been instructed to discontinue this medication. .      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  ACP discussion was held with the patient during this visit. Patient has an advance directive (not in EMR), copy requested.            Objective   Vitals:    24 1154   BP: 126/80   Pulse: 70   Resp: 18   SpO2: 98%   Weight: 101 kg (221 lb 9 oz)   Height: 155 cm (61.02\")   PainSc: 0-No pain       Estimated body mass index is 41.83 kg/m² as calculated from the following:    Height as of this encounter: 155 cm (61.02\").    Weight as of this encounter: 101 kg (221 lb 9 oz).    Class 3 Severe Obesity (BMI >=40). Obesity-related health conditions include the following: hypertension, GERD, and prediabetes . Obesity is unchanged. BMI is is above average; BMI management plan is completed. We discussed Information on healthy weight added to patient's after visit summary.       Does the patient have evidence of cognitive impairment? Yes                                                                                                Health  Risk Assessment    Smoking Status:  Social History     Tobacco Use   Smoking Status Never    Passive exposure: Never   Smokeless Tobacco Never     Alcohol Consumption:  Social History     Substance and Sexual Activity   Alcohol Use Never       Fall Risk Screen    STEADI Fall Risk Assessment was completed, and patient is at HIGH risk for falls. Assessment completed on:2024    Depression Screenin/4/2024    11:51 AM   PHQ-2/PHQ-9 Depression Screening   Little interest or pleasure in doing things Not at all   Feeling down, depressed, or hopeless Not at all     Health Habits and Functional and Cognitive Screenin/4/2024    11:52 AM "   Functional & Cognitive Status   Do you have difficulty preparing food and eating? No   Do you have difficulty bathing yourself, getting dressed or grooming yourself? No   Do you have difficulty using the toilet? No   Do you have difficulty moving around from place to place? No   Do you have trouble with steps or getting out of a bed or a chair? Yes   Current Diet Unhealthy Diet   Dental Exam Up to date   Eye Exam Up to date   Exercise (times per week) 0 times per week   Current Exercises Include No Regular Exercise   Do you need help using the phone?  No   Are you deaf or do you have serious difficulty hearing?  Yes   Do you need help to go to places out of walking distance? Yes   Do you need help shopping? No   Do you need help preparing meals?  No   Do you need help with housework?  No   Do you need help with laundry? No   Do you need help taking your medications? No   Do you need help managing money? No   Do you ever drive or ride in a car without wearing a seat belt? No   Have you felt unusual stress, anger or loneliness in the last month? No   Who do you live with? Alone   If you need help, do you have trouble finding someone available to you? No   Have you been bothered in the last four weeks by sexual problems? No   Do you have difficulty concentrating, remembering or making decisions? Yes           Age-appropriate Screening Schedule:  Refer to the list below for future screening recommendations based on patient's age, sex and/or medical conditions. Orders for these recommended tests are listed in the plan section. The patient has been provided with a written plan.    Health Maintenance List  Health Maintenance   Topic Date Due    ZOSTER VACCINE (2 of 3) 01/24/2010    TDAP/TD VACCINES (2 - Td or Tdap) 12/16/2021    COVID-19 Vaccine (4 - 2024-25 season) 09/01/2024    HEMOGLOBIN A1C  10/26/2024    BMI FOLLOWUP  11/01/2024    DXA SCAN  01/03/2025    ANNUAL WELLNESS VISIT  11/04/2025    RSV Vaccine - Adults   Completed    INFLUENZA VACCINE  Completed    Pneumococcal Vaccine 65+  Completed                                                                                                                                              Immunization History   Administered Date(s) Administered    Arexvy (RSV, Adults 60+ yrs) 11/09/2023    COVID-19 (PFIZER) Purple Cap Monovalent 02/03/2021, 02/25/2021, 09/30/2021    Fluzone High-Dose 65+YRS 10/26/2016, 10/19/2017, 11/15/2017, 11/04/2024    Fluzone High-Dose 65+yrs 09/30/2021, 10/14/2022, 11/01/2023    Hepatitis A 03/11/2019, 10/03/2019    Influenza, Unspecified 09/01/2021    Pneumococcal Conjugate 13-Valent (PCV13) 04/19/2017    Pneumococcal Conjugate 20-Valent (PCV20) 11/01/2023    Pneumococcal Polysaccharide (PPSV23) 12/16/2010, 11/15/2014    Pneumococcal, Unspecified 03/16/2000    Tdap 12/16/2011    Zostavax 11/29/2009       CMS Preventative Services Quick Reference  Risk Factors Identified During Encounter  Immunizations Discussed/Encouraged: Influenza    The above risks/problems have been discussed with the patient.  Pertinent information has been shared with the patient in the After Visit Summary.  An After Visit Summary and PPPS were made available to the patient.    Follow Up:   Next Medicare Wellness visit to be scheduled in 1 year.         Additional E&M Note during same encounter follows:  Patient has additional, significant, and separately identifiable condition(s)/problem(s) that require work above and beyond the Medicare Wellness Visit     Chief Complaint  Medicare Wellness-subsequent    Subjective      HPI  PAT is also being seen today for additional medical problem/s.       The patient is an 84-year-old female presenting today for a Medicare wellness visit. She is accompanied by an adult female.    She reports a decline in her physical health compared to a year ago, with significant health changes occurring this year. Her memory has slightly deteriorated. She was  "hospitalized in April 2024 at [facility]. She is under the care of Dr. Janet Valdez for cardiology, Dr. Cristi Montana for urology, and Dr. Cristi Andujar for neurology. She is not currently seeing Dr. Lalo Fish for infectious disease or Dr. Christy Mathur for pulmonology. She has an advanced care plan in place.    She has asthma but has not used her Breo or albuterol inhalers for a while. She does not experience shortness of breath during sleep or activity but occasionally experiences wheezing. She is not currently taking montelukast.    She is on alendronate for osteopenia. She had a bone density test in January 2023 and started alendronate thereafter. She receives her blood pressure and cholesterol medications from her cardiologist. She requires a refill of amlodipine.    She experiences allergies, particularly nasal congestion, during this time of year and takes Claritin daily. She is on omeprazole for heartburn.    She has concerns about yeast infections, experiencing itching on her vulva, groin, and under her stomach. She has been itching for about 3 weeks since starting antibiotics. She suspects her UTI may not have fully resolved as she experiences a burning sensation during urination, but reports no pelvic pain.    She consumed a SlimFast drink around 7:30 AM today. She does not require any prescription refills at this time. She has stopped taking aspirin and is now on Xarelto. She had a mammogram in January 2023 and has never had an abnormal mammogram.    ALLERGIES  She is allergic to LATEX.            Objective   Vital Signs:  /80   Pulse 70   Resp 18   Ht 155 cm (61.02\")   Wt 101 kg (221 lb 9 oz)   SpO2 98%   BMI 41.83 kg/m²   Physical Exam  Constitutional:       General: She is not in acute distress.     Appearance: She is obese.   HENT:      Right Ear: Tympanic membrane and ear canal normal.      Left Ear: Tympanic membrane and ear canal normal.      Nose: No congestion or rhinorrhea.      " Mouth/Throat:      Mouth: Mucous membranes are moist.      Pharynx: No oropharyngeal exudate or posterior oropharyngeal erythema.   Eyes:      General:         Right eye: No discharge.         Left eye: No discharge.      Conjunctiva/sclera: Conjunctivae normal.   Neck:      Thyroid: No thyromegaly.   Cardiovascular:      Rate and Rhythm: Normal rate and regular rhythm.   Pulmonary:      Effort: Pulmonary effort is normal.      Breath sounds: Normal breath sounds.   Abdominal:      Palpations: Abdomen is soft. There is no hepatomegaly.      Tenderness: There is no abdominal tenderness.   Musculoskeletal:      Cervical back: Neck supple.   Lymphadenopathy:      Head:      Right side of head: No submandibular, preauricular or posterior auricular adenopathy.      Left side of head: No submandibular, preauricular or posterior auricular adenopathy.      Cervical: No cervical adenopathy.   Skin:     General: Skin is warm.      Findings: Rash (Erythema beneath pannus, left groin, bilateral labia) present.   Neurological:      Mental Status: She is alert and oriented to person, place, and time.   Psychiatric:         Attention and Perception: Attention normal.         Mood and Affect: Mood normal.         Behavior: Behavior normal.         Thought Content: Thought content normal.         Judgment: Judgment normal.                 The following data was reviewed by: Brittany Cantor MD on 11/04/2024:   Cardiology office visit 10/25/2024     Assessment and Plan             1. Memory decline.  The patient reports a decline in memory over the past year. No specific treatment was discussed during this visit.    2. Asthma.  She has not used Breo or albuterol inhalers for a long time. She does not experience shortness of breath at night or during activity but occasionally experiences wheezing. Albuterol will be prescribed for emergency use when she is short of breath or wheezing.     3. Osteopenia.  She will continue taking  alendronate to reduce the risk of fractures. A follow-up bone density test is scheduled for early February 2025 to assess the effectiveness of the treatment.    4. Hypertension.  Stable.    5. Hyperlipidemia.  She is currently taking atorvastatin for cholesterol management.     6. Allergic rhinitis.  Uncontrolled.  She experiences nasal congestion, especially during this time of year. She takes Claritin daily and prefers not to restart Singulair.    7. Gastroesophageal Reflux Disease (GERD).  She needs to continue taking omeprazole for heartburn.     8.  Acute urinary tract infection (UTI).  New.  She reports burning sensation when urinating.  She does not feel as though the symptoms cleared up from her last antibiotic.  At this time start a prescription for Macrobid.  Urine culture sent.    9. Yeast infection.  New.  She reports itching in the vulva and groin area, which started during antibiotic treatment three weeks ago.  Fluconazole not recommended due to interaction with amiodarone.  Apply topical clotrimazole.  If not improving after a week switch to nystatin cream.    10. Health Maintenance.  Blood work will be conducted to assess cholesterol, thyroid function, A1c, and anemia. The results will be communicated via [employer] and a mailed letter. A follow-up bone density test is scheduled for early February 2025. She has decided not to continue with mammograms for breast cancer screening.    11.  Subclinical hypothyroidism.  Most recent TSH borderline elevated greater than 5 within normal T4.  Check follow-up thyroid function.         Prediabetes    Osteopenia of multiple sites    Essential hypertension  Hypertension is stable and controlled  Continue current treatment regimen.  Blood pressure will be reassessed in 1 year.  Medicare annual wellness visit, subsequent    At high risk for falls    Immunization due    H/O: stroke    Hypokalemia    Class 3 severe obesity due to excess calories with serious comorbidity  and body mass index (BMI) of 40.0 to 44.9 in adult    Moderate persistent asthma without complication  Asthma is stable.  The patient is experiencing no daytime asthma symptoms and she is experiencing no nighttime asthma symptoms.    Plan: Discontinue the following medication/s; Breo.    Discussed medication dosage, use, side effects, and goals of treatment in detail.      Patient Treatment Goals: minimizing limitation in activity.    Followup in 1 year.          Subclinical hypothyroidism    Screening for deficiency anemia    Seasonal allergic rhinitis due to pollen    Gastroesophageal reflux disease, unspecified whether esophagitis present    Acute UTI    Vulvovaginitis      Orders Placed This Encounter   Procedures    Urine Culture - Urine, Urine, Clean Catch     Standing Status:   Future     Standing Expiration Date:   11/4/2025     Order Specific Question:   Release to patient     Answer:   Routine Release [[id number]]    DEXA Bone Density Axial     Standing Status:   Future     Standing Expiration Date:   11/4/2025     Order Specific Question:   Reason for Exam:     Answer:   osteopenia     Order Specific Question:   Release to patient     Answer:   Routine Release [[id number]]     Order Specific Question:   Does this patient have a diabetic monitoring/medication delivering device on?     Answer:   No     Order Specific Question:   Is patient taking or have taken long term Glucocorticoid (steroids)?     Answer:   No     Order Specific Question:   Does the patient have rheumatoid arthritis?     Answer:   No     Order Specific Question:   Does the patient have secondary osteoporosis?     Answer:   No    Fluzone High-Dose 65+yrs (8853-1439)    CBC (No Diff)     Standing Status:   Future     Number of Occurrences:   1     Standing Expiration Date:   11/4/2025     Order Specific Question:   Release to patient     Answer:   Routine Release [[id number]]    Comprehensive Metabolic Panel     Standing Status:   Future      Number of Occurrences:   1     Standing Expiration Date:   11/4/2025     Order Specific Question:   Release to patient     Answer:   Routine Release [[id number]]    Lipid Panel     Standing Status:   Future     Number of Occurrences:   1     Standing Expiration Date:   11/4/2025     Order Specific Question:   Release to patient     Answer:   Routine Release [[id number]]    TSH     Standing Status:   Future     Number of Occurrences:   1     Standing Expiration Date:   11/4/2025     Order Specific Question:   Release to patient     Answer:   Routine Release [[id number]]    T4, Free     Standing Status:   Future     Number of Occurrences:   1     Standing Expiration Date:   11/4/2025     Order Specific Question:   Release to patient     Answer:   Routine Release [[id number]]    Hemoglobin A1c     Standing Status:   Future     Number of Occurrences:   1     Standing Expiration Date:   11/4/2025     Order Specific Question:   Release to patient     Answer:   Routine Release [[id number]]    POC Urinalysis Dipstick, Automated     Order Specific Question:   Release to patient     Answer:   Routine Release [[id number]]     New Medications Ordered This Visit   Medications    albuterol sulfate  (90 Base) MCG/ACT inhaler     Sig: Inhale 2 puffs Every 4 (Four) Hours As Needed for Wheezing.     Dispense:  18 g     Refill:  3    omeprazole (priLOSEC) 20 MG capsule     Sig: Take 1 capsule by mouth Daily.     Dispense:  90 capsule     Refill:  3    alendronate (FOSAMAX) 35 MG tablet     Sig: Take 1 tablet by mouth Every 7 (Seven) Days.     Dispense:  12 tablet     Refill:  3     Requesting 1 year supply    nitrofurantoin, macrocrystal-monohydrate, (MACROBID) 100 MG capsule     Sig: Take 1 capsule by mouth Every 12 (Twelve) Hours for 5 days.     Dispense:  10 capsule     Refill:  0    clotrimazole (LOTRIMIN) 1 % cream     Sig: Apply 1 Application topically to the appropriate area as directed 2 (Two) Times a Day.      Dispense:  113 g     Refill:  2          Follow Up   Return in about 1 year (around 11/5/2025) for MWV and fasting labs.  Patient was given instructions and counseling regarding her condition or for health maintenance advice. Please see specific information pulled into the AVS if appropriate.  Patient or patient representative verbalized consent for the use of Ambient Listening during the visit with  Brittany Cantor MD for chart documentation. 11/4/2024  12:51 EST    Electronically signed by Brittany Cantor MD, 11/04/24, 12:54 PM EST.

## 2024-11-04 NOTE — ASSESSMENT & PLAN NOTE
Asthma is stable.  The patient is experiencing no daytime asthma symptoms and she is experiencing no nighttime asthma symptoms.    Plan: Discontinue the following medication/s; Breo.    Discussed medication dosage, use, side effects, and goals of treatment in detail.      Patient Treatment Goals: minimizing limitation in activity.    Followup in 1 year.

## 2024-11-04 NOTE — PATIENT INSTRUCTIONS
Advance Care Planning and Advance Directives     You make decisions on a daily basis - decisions about where you want to live, your career, your home, your life. Perhaps one of the most important decisions you face is your choice for future medical care. Take time to talk with your family and your healthcare team and start planning today.  Advance Care Planning is a process that can help you:  Understand possible future healthcare decisions in light of your own experiences  Reflect on those decision in light of your goals and values  Discuss your decisions with those closest to you and the healthcare professionals that care for you  Make a plan by creating a document that reflects your wishes    Surrogate Decision Maker  In the event of a medical emergency, which has left you unable to communicate or to make your own decisions, you would need someone to make decisions for you.  It is important to discuss your preferences for medical treatment with this person while you are in good health.     Qualities of a surrogate decision maker:  Willing to take on this role and responsibility  Knows what you want for future medical care  Willing to follow your wishes even if they don't agree with them  Able to make difficult medical decisions under stressful circumstances    Advance Directives  These are legal documents you can create that will guide your healthcare team and decision maker(s) when needed. These documents can be stored in the electronic medical record.    Living Will - a legal document to guide your care if you have a terminal condition or a serious illness and are unable to communicate. States vary by statute in document names/types, but most forms may include one or more of the following:        -  Directions regarding life-prolonging treatments        -  Directions regarding artificially provided nutrition/hydration        -  Choosing a healthcare decision maker        -  Direction regarding organ/tissue  donation    Durable Power of  for Healthcare - this document names an -in-fact to make medical decisions for you, but it may also allow this person to make personal and financial decisions for you. Please seek the advice of an  if you need this type of document.    **Advance Directives are not required and no one may discriminate against you if you do not sign one.    Medical Orders  Many states allow specific forms/orders signed by your physician to record your wishes for medical treatment in your current state of health. This form, signed in personal communication with your physician, addresses resuscitation and other medical interventions that you may or may not want.      For more information or to schedule a time with a [facility] Advance Care Planning Facilitator contact: [facility] or call [phone] and someone will contact you directly.  Fall Prevention in the Home, Adult  Falls can cause injuries and affect people of all ages. There are many simple things that you can do to make your home safe and to help prevent falls.  If you need it, ask for help making these changes.  What actions can I take to prevent falls?  General information  Use good lighting in all rooms. Make sure to:  Replace any light bulbs that burn out.  Turn on lights if it is dark and use night-lights.  Keep items that you use often in easy-to-reach places. Lower the shelves around your home if needed.  Move furniture so that there are clear paths around it.  Do not keep throw rugs or other things on the floor that can make you trip.  If any of your floors are uneven, fix them.  Add color or contrast paint or tape to clearly eloy and help you see:  Grab bars or handrails.  First and last steps of staircases.  Where the edge of each step is.  If you use a ladder or stepladder:  Make sure that it is fully opened. Do not climb a closed ladder.  Make sure the sides of the ladder are locked in  place.  Have someone hold the ladder while you use it.  Know where your pets are as you move through your home.  What can I do in the bathroom?         Keep the floor dry. Clean up any water that is on the floor right away.  Remove soap buildup in the bathtub or shower. Buildup makes bathtubs and showers slippery.  Use non-skid mats or decals on the floor of the bathtub or shower.  Attach bath mats securely with double-sided, non-slip rug tape.  If you need to sit down while you are in the shower, use a non-slip stool.  Install grab bars by the toilet and in the bathtub and shower. Do not use towel bars as grab bars.  What can I do in the bedroom?  Make sure that you have a light by your bed that is easy to reach.  Do not use any sheets or blankets on your bed that hang to the floor.  Have a firm bench or chair with side arms that you can use for support when you get dressed.  What can I do in the kitchen?  Clean up any spills right away.  If you need to reach something above you, use a sturdy step stool that has a grab bar.  Keep electrical cables out of the way.  Do not use floor polish or wax that makes floors slippery.  What can I do with my stairs?  Do not leave anything on the stairs.  Make sure that you have a light switch at the top and the bottom of the stairs. Have them installed if you do not have them.  Make sure that there are handrails on both sides of the stairs. Fix handrails that are broken or loose. Make sure that handrails are as long as the staircases.  Install non-slip stair treads on all stairs in your home if they do not have carpet.  Avoid having throw rugs at the top or bottom of stairs, or secure the rugs with carpet tape to prevent them from moving.  Choose a carpet design that does not hide the edge of steps on the stairs. Make sure that carpet is firmly attached to the stairs. Fix any carpet that is loose or worn.  What can I do on the outside of my home?  Use bright outdoor  lighting.  Repair the edges of walkways and driveways and fix any cracks. Clear paths of anything that can make you trip, such as tools or rocks.  Add color or contrast paint or tape to clearly eloy and help you see high doorway thresholds.  Trim any bushes or trees on the main path into your home.  Check that handrails are securely fastened and in good repair. Both sides of all steps should have handrails.  Install guardrails along the edges of any raised decks or porches.  Have leaves, snow, and ice cleared regularly. Use sand, salt, or ice melt on walkways during winter months if you live where there is ice and snow.  In the garage, clean up any spills right away, including grease or oil spills.  What other actions can I take?  Review your medicines with your health care provider. Some medicines can make you confused or feel dizzy. This can increase your chance of falling.  Wear closed-toe shoes that fit well and support your feet. Wear shoes that have rubber soles and low heels.  Use a cane, walker, scooter, or crutches that help you move around if needed.  Talk with your provider about other ways that you can decrease your risk of falls. This may include seeing a physical therapist to learn to do exercises to improve movement and strength.  Where to find more information  Centers for Disease Control and PreventionKENNEDI: cdc.gov  National Pipestem on Aging: estela.nih.gov  National Pipestem on Aging: estela.nih.gov  Contact a health care provider if:  You are afraid of falling at home.  You feel weak, drowsy, or dizzy at home.  You fall at home.  Get help right away if you:  Lose consciousness or have trouble moving after a fall.  Have a fall that causes a head injury.  These symptoms may be an emergency. Get help right away. Call 911.  Do not wait to see if the symptoms will go away.  Do not drive yourself to the hospital.  This information is not intended to replace advice given to you by your health care  provider. Make sure you discuss any questions you have with your health care provider.  Document Revised: 08/21/2023 Document Reviewed: 08/21/2023  Elsevier Patient Education © 2024 [employer]    Sit-to-Stand Exercise    The sit-to-stand exercise (also known as the chair stand or chair rise exercise) strengthens your lower body and helps you maintain or improve your mobility and independence. The end goal is to do the sit-to-stand exercise without using your hands. This will be easier as you become stronger. You should always talk with your health care provider before starting any exercise program, especially if you have had recent surgery.  Do the exercise exactly as told by your health care provider and adjust it as directed. It is normal to feel mild stretching, pulling, tightness, or discomfort as you do this exercise, but you should stop right away if you feel sudden pain or your pain gets worse. Do not begin doing this exercise until told by your health care provider.  What the sit-to-stand exercise does  The sit-to-stand exercise helps to strengthen the muscles in your thighs and the muscles in the center of your body that give you stability (core muscles). This exercise is especially helpful if:  You have had knee or hip surgery.  You have trouble getting up from a chair, out of a car, or off the toilet due to muscle weakness.  How to do the sit-to-stand exercise  Sit toward the front edge of a sturdy chair without armrests. Your knees should be bent and your feet should be flat on the floor and shoulder-width apart and underneath your hips.  Place your hands lightly on each side of the seat. Keep your back and neck as straight as possible, with your chest slightly forward.  Breathe in slowly. Lean forward and slightly shift your weight to the front of your feet.  Breathe out as you slowly stand up. Try not to support any weight with your hands.  Stand and pause for a full breath in and out.  Breathe in  as you sit down slowly. Tighten your core and abdominal muscles to control your lowering as much as possible. You should lower yourself back to the chair slowly, not just drop back into the seat.  Breathe out slowly.  Do this exercise 10-15 times. If needed, do it fewer times until you build up strength.  Rest for 1 minute, then do another set of 10-15 repetitions.  To change the difficulty of the sit-to-stand exercise  If the exercise is too difficult, use a chair with sturdy armrests, and push off the armrests to help you come to the standing position. You can also use the armrests to help slowly lower yourself back to sitting. As this gets easier, try to use your arms less. You can also place a firm cushion or pillow on the chair to make the surface higher.  If this exercise is too easy, do not use your arms to help raise or lower yourself. You can also wear a weighted vest, use hand weights, increase your repetitions, or try a lower chair.  General tips  You may feel tired when starting an exercise routine. This is normal.  You may have muscle soreness that lasts a few days. This is normal. As you get stronger, you may not feel muscle soreness.  Use smooth, steady movements.  Do not  hold your breath during strength exercises. This can cause unsafe changes in your blood pressure.  Breathe in slowly through your nose, and breathe out slowly through your mouth.  Summary  Strengthening your lower body is an important step to help you move safely and independently.  The sit-to-stand exercise helps strengthen the muscles in your thighs and core.  You should always talk with your health care provider before starting any exercise program, especially if you have had recent surgery.  This information is not intended to replace advice given to you by your health care provider. Make sure you discuss any questions you have with your health care provider.  Document Revised: 04/10/2022 Document Reviewed: 04/10/2022  [employer]  Patient Education ©  [employer]    You are due for adacel Tdap vaccination. (provides protection against tetanus, diptheria and whooping cough) Please  get the immunization at your local pharmacy at your earliest convenience.  Please click on the link for more information about this vaccine.    https://www.cdc.gov/vaccines/vpd/dtap-tdap-td/public/index.html    You are due for Shingrix vaccination series ( the newest shingles vaccine).  It is a two shot series spaced 2-6 months apart. Please get this vaccine series started at your earliest convenience at your local pharmacy to help avoid shingles outbreak. It is more effective than the old Zostavax vaccine and is recommended even if you have had the Zostavax vaccine in the past.  Once the Shingrix series is completed, it does not need to be repeated.   For more information, please look at the website below:  https://www.cdc.gov/vaccines/vpd/shingles/public/shingrix/index.html    You are due for a Covid 19 vaccination. (provides protection against Covid 19 Viral Infection) Please  talk to your pharmacist and get the immunization at your local pharmacy at your earliest convenience. Please click on the link for more information about this vaccine.   https://www.cdc.gov/coronavirus/2019-ncov/vaccines/stay-up-to-date.html        Medicare Wellness  Personal Prevention Plan of Service     Date of Office Visit:    Encounter Provider:  Brittany Cantor MD  Place of Service:  [facility]  Patient Name: Mariela Long  :  1940    As part of the Medicare Wellness portion of your visit today, we are providing you with this personalized preventive plan of services (PPPS). This plan is based upon recommendations of the [place] Preventive Services Task Force (USPSTF) and the Advisory Committee on Immunization Practices (ACIP).    This lists the preventive care services that should be considered, and provides dates of when you  are due. Items listed as completed are up-to-date and do not require any further intervention.    Health Maintenance   Topic Date Due   • ZOSTER VACCINE (2 of 3) 01/24/2010   • TDAP/TD VACCINES (2 - Td or Tdap) 12/16/2021   • INFLUENZA VACCINE  08/01/2024   • COVID-19 Vaccine (4 - 2024-25 season) 09/01/2024   • HEMOGLOBIN A1C  10/26/2024   • ANNUAL WELLNESS VISIT  11/01/2024   • BMI FOLLOWUP  11/01/2024   • DXA SCAN  01/03/2025   • RSV Vaccine - Adults  Completed   • Pneumococcal Vaccine 65+  Completed       No orders of the defined types were placed in this encounter.      No follow-ups on file.

## 2024-11-07 ENCOUNTER — TELEPHONE (OUTPATIENT)
Age: 84
End: 2024-11-07
Payer: MEDICARE

## 2024-11-07 LAB
BACTERIA SPEC AEROBE CULT: ABNORMAL
BACTERIA SPEC AEROBE CULT: ABNORMAL

## 2024-11-07 NOTE — TELEPHONE ENCOUNTER
"Name: Mariela Long NATHANAEL \"PAT\"      Relationship: Self      Best Callback Number: [phone]      HUB PROVIDED THE RELAY MESSAGE FROM THE OFFICE    OK to relay         Please call patient as she does not use her MyChart.  UTI shows E. coli and Klebsiella bacteria.  The Macrobid antibiotic should help her symptoms.  Follow-up in clinic if you are still having problems.            PATIENT: VOICED UNDERSTANDING AND HAS NO FURTHER QUESTIONS AT THIS TIME    ADDITIONAL INFORMATION:   "

## 2024-11-07 NOTE — TELEPHONE ENCOUNTER
OK to relay       Please call patient as she does not use her MyChart.  UTI shows E. coli and Klebsiella bacteria.  The Macrobid antibiotic should help her symptoms.  Follow-up in clinic if you are still having problems.

## 2024-11-19 DIAGNOSIS — I10 ESSENTIAL HYPERTENSION: Chronic | ICD-10-CM

## 2024-11-19 RX ORDER — LISINOPRIL 40 MG/1
40 TABLET ORAL
Qty: 90 TABLET | Refills: 3 | Status: SHIPPED | OUTPATIENT
Start: 2024-11-19

## 2024-11-19 NOTE — TELEPHONE ENCOUNTER
Rx Refill Note  Requested Prescriptions     Pending Prescriptions Disp Refills    lisinopril (PRINIVIL,ZESTRIL) 40 MG tablet [Pharmacy Med Name: Lisinopril 40 MG Oral Tablet] 90 tablet 3     Sig: TAKE 1 TABLET BY MOUTH AT NIGHT      Last office visit with prescribing clinician: 11/4/2024   Last telemedicine visit with prescribing clinician: Visit date not found   Next office visit with prescribing clinician: 11/5/2025     Mahogany Feng MA  11/19/24, 07:53 EST

## 2025-01-23 DIAGNOSIS — J45.40 MODERATE PERSISTENT ASTHMA WITHOUT COMPLICATION: Chronic | ICD-10-CM

## 2025-01-23 RX ORDER — ALBUTEROL SULFATE 90 UG/1
2 INHALANT RESPIRATORY (INHALATION) EVERY 4 HOURS PRN
Qty: 18 G | Refills: 3 | Status: SHIPPED | OUTPATIENT
Start: 2025-01-23

## 2025-03-27 ENCOUNTER — TRANSCRIBE ORDERS (OUTPATIENT)
Dept: LAB | Facility: HOSPITAL | Age: 85
End: 2025-03-27
Payer: MEDICARE

## 2025-03-27 ENCOUNTER — LAB (OUTPATIENT)
Dept: LAB | Facility: HOSPITAL | Age: 85
End: 2025-03-27
Payer: MEDICARE

## 2025-03-27 DIAGNOSIS — B96.1 BACTEREMIA DUE TO KLEBSIELLA PNEUMONIAE: ICD-10-CM

## 2025-03-27 DIAGNOSIS — B96.5 ARTHRITIS DUE TO PSEUDOMONAS: ICD-10-CM

## 2025-03-27 DIAGNOSIS — N32.81 DETRUSOR INSTABILITY OF BLADDER: ICD-10-CM

## 2025-03-27 DIAGNOSIS — I10 ESSENTIAL HYPERTENSION, BENIGN: ICD-10-CM

## 2025-03-27 DIAGNOSIS — R78.81 BACTEREMIA DUE TO KLEBSIELLA PNEUMONIAE: ICD-10-CM

## 2025-03-27 DIAGNOSIS — M00.80 ARTHRITIS DUE TO PSEUDOMONAS: ICD-10-CM

## 2025-03-27 DIAGNOSIS — B96.5 ARTHRITIS DUE TO PSEUDOMONAS: Primary | ICD-10-CM

## 2025-03-27 DIAGNOSIS — M00.80 ARTHRITIS DUE TO PSEUDOMONAS: Primary | ICD-10-CM

## 2025-03-27 DIAGNOSIS — B96.29 NON-SHIGA TOXIN-PRODUCING E. COLI: ICD-10-CM

## 2025-03-27 LAB

## 2025-03-27 PROCEDURE — 81001 URINALYSIS AUTO W/SCOPE: CPT

## 2025-03-27 PROCEDURE — 87186 SC STD MICRODIL/AGAR DIL: CPT

## 2025-03-27 PROCEDURE — 87077 CULTURE AEROBIC IDENTIFY: CPT

## 2025-03-27 PROCEDURE — 87086 URINE CULTURE/COLONY COUNT: CPT

## 2025-03-29 LAB — BACTERIA SPEC AEROBE CULT: ABNORMAL

## 2025-04-01 ENCOUNTER — LAB (OUTPATIENT)
Dept: LAB | Facility: HOSPITAL | Age: 85
End: 2025-04-01
Payer: MEDICARE

## 2025-04-01 ENCOUNTER — TRANSCRIBE ORDERS (OUTPATIENT)
Dept: LAB | Facility: HOSPITAL | Age: 85
End: 2025-04-01
Payer: MEDICARE

## 2025-04-01 DIAGNOSIS — N39.0 URINARY TRACT INFECTION WITHOUT HEMATURIA, SITE UNSPECIFIED: Primary | ICD-10-CM

## 2025-04-01 LAB
ALBUMIN SERPL-MCNC: 4 G/DL (ref 3.5–5.2)
ALBUMIN/GLOB SERPL: 1.4 G/DL
ALP SERPL-CCNC: 102 U/L (ref 39–117)
ALT SERPL W P-5'-P-CCNC: 14 U/L (ref 1–33)
ANION GAP SERPL CALCULATED.3IONS-SCNC: 9 MMOL/L (ref 5–15)
AST SERPL-CCNC: 20 U/L (ref 1–32)
BASOPHILS # BLD AUTO: 0.04 10*3/MM3 (ref 0–0.2)
BASOPHILS NFR BLD AUTO: 0.7 % (ref 0–1.5)
BILIRUB SERPL-MCNC: 0.3 MG/DL (ref 0–1.2)
BUN SERPL-MCNC: 17 MG/DL (ref 8–23)
BUN/CREAT SERPL: 20.7 (ref 7–25)
CALCIUM SPEC-SCNC: 9 MG/DL (ref 8.6–10.5)
CHLORIDE SERPL-SCNC: 105 MMOL/L (ref 98–107)
CO2 SERPL-SCNC: 28 MMOL/L (ref 22–29)
CREAT SERPL-MCNC: 0.82 MG/DL (ref 0.57–1)
CRP SERPL-MCNC: 0.61 MG/DL (ref 0–0.5)
DEPRECATED RDW RBC AUTO: 51.6 FL (ref 37–54)
EGFRCR SERPLBLD CKD-EPI 2021: 71.5 ML/MIN/1.73
EOSINOPHIL # BLD AUTO: 0.21 10*3/MM3 (ref 0–0.4)
EOSINOPHIL NFR BLD AUTO: 3.9 % (ref 0.3–6.2)
ERYTHROCYTE [DISTWIDTH] IN BLOOD BY AUTOMATED COUNT: 15.1 % (ref 12.3–15.4)
ERYTHROCYTE [SEDIMENTATION RATE] IN BLOOD: 46 MM/HR (ref 0–30)
GLOBULIN UR ELPH-MCNC: 2.8 GM/DL
GLUCOSE SERPL-MCNC: 96 MG/DL (ref 65–99)
HCT VFR BLD AUTO: 35.7 % (ref 34–46.6)
HGB BLD-MCNC: 11.5 G/DL (ref 12–15.9)
IMM GRANULOCYTES # BLD AUTO: 0.01 10*3/MM3 (ref 0–0.05)
IMM GRANULOCYTES NFR BLD AUTO: 0.2 % (ref 0–0.5)
LYMPHOCYTES # BLD AUTO: 1.1 10*3/MM3 (ref 0.7–3.1)
LYMPHOCYTES NFR BLD AUTO: 20.4 % (ref 19.6–45.3)
MCH RBC QN AUTO: 29.8 PG (ref 26.6–33)
MCHC RBC AUTO-ENTMCNC: 32.2 G/DL (ref 31.5–35.7)
MCV RBC AUTO: 92.5 FL (ref 79–97)
MONOCYTES # BLD AUTO: 0.44 10*3/MM3 (ref 0.1–0.9)
MONOCYTES NFR BLD AUTO: 8.2 % (ref 5–12)
NEUTROPHILS NFR BLD AUTO: 3.58 10*3/MM3 (ref 1.7–7)
NEUTROPHILS NFR BLD AUTO: 66.6 % (ref 42.7–76)
NRBC BLD AUTO-RTO: 0 /100 WBC (ref 0–0.2)
PLATELET # BLD AUTO: 221 10*3/MM3 (ref 140–450)
PMV BLD AUTO: 10.4 FL (ref 6–12)
POTASSIUM SERPL-SCNC: 4.2 MMOL/L (ref 3.5–5.2)
PROT SERPL-MCNC: 6.8 G/DL (ref 6–8.5)
RBC # BLD AUTO: 3.86 10*6/MM3 (ref 3.77–5.28)
SODIUM SERPL-SCNC: 142 MMOL/L (ref 136–145)
WBC NRBC COR # BLD AUTO: 5.38 10*3/MM3 (ref 3.4–10.8)

## 2025-04-01 PROCEDURE — 86140 C-REACTIVE PROTEIN: CPT | Performed by: INTERNAL MEDICINE

## 2025-04-01 PROCEDURE — 36415 COLL VENOUS BLD VENIPUNCTURE: CPT

## 2025-04-01 PROCEDURE — 80053 COMPREHEN METABOLIC PANEL: CPT

## 2025-04-01 PROCEDURE — 85652 RBC SED RATE AUTOMATED: CPT

## 2025-04-01 PROCEDURE — 85025 COMPLETE CBC W/AUTO DIFF WBC: CPT
